# Patient Record
Sex: FEMALE | Race: WHITE | Employment: UNEMPLOYED | URBAN - METROPOLITAN AREA
[De-identification: names, ages, dates, MRNs, and addresses within clinical notes are randomized per-mention and may not be internally consistent; named-entity substitution may affect disease eponyms.]

---

## 2017-06-15 ENCOUNTER — ALLSCRIPTS OFFICE VISIT (OUTPATIENT)
Dept: OTHER | Facility: OTHER | Age: 47
End: 2017-06-15

## 2017-06-15 DIAGNOSIS — Z13.220 ENCOUNTER FOR SCREENING FOR LIPOID DISORDERS: ICD-10-CM

## 2017-06-15 DIAGNOSIS — K91.2 POSTSURGICAL MALABSORPTION, NOT ELSEWHERE CLASSIFIED: ICD-10-CM

## 2017-06-15 DIAGNOSIS — Z98.84 BARIATRIC SURGERY STATUS: ICD-10-CM

## 2017-07-10 ENCOUNTER — HOSPITAL ENCOUNTER (OUTPATIENT)
Dept: RADIOLOGY | Facility: HOSPITAL | Age: 47
Discharge: HOME | End: 2017-07-10
Payer: COMMERCIAL

## 2017-07-10 DIAGNOSIS — Z98.84 BARIATRIC SURGERY STATUS: ICD-10-CM

## 2017-07-10 PROCEDURE — 74240 X-RAY XM UPR GI TRC 1CNTRST: CPT

## 2017-07-26 DIAGNOSIS — Z98.84 BARIATRIC SURGERY STATUS: ICD-10-CM

## 2017-08-31 ENCOUNTER — ALLSCRIPTS OFFICE VISIT (OUTPATIENT)
Dept: OTHER | Facility: OTHER | Age: 47
End: 2017-08-31

## 2017-10-03 ENCOUNTER — ANESTHESIA EVENT (OUTPATIENT)
Dept: GASTROENTEROLOGY | Facility: HOSPITAL | Age: 47
End: 2017-10-03
Payer: COMMERCIAL

## 2017-10-03 RX ORDER — QUETIAPINE FUMARATE 50 MG/1
50 TABLET, FILM COATED ORAL
COMMUNITY
End: 2019-12-30

## 2017-10-03 RX ORDER — FERROUS SULFATE 325(65) MG
325 TABLET ORAL
COMMUNITY
End: 2019-12-30

## 2017-10-03 RX ORDER — IBUPROFEN 200 MG
1 CAPSULE ORAL DAILY
COMMUNITY
End: 2021-01-27

## 2017-10-03 RX ORDER — MELATONIN
1000 DAILY
COMMUNITY
End: 2018-02-02 | Stop reason: ALTCHOICE

## 2017-10-03 RX ORDER — METHYLPHENIDATE HYDROCHLORIDE 5 MG/1
5 TABLET ORAL
COMMUNITY
End: 2018-05-03 | Stop reason: ALTCHOICE

## 2017-10-03 RX ORDER — BUPROPION HYDROCHLORIDE 150 MG/1
150 TABLET ORAL 2 TIMES DAILY
COMMUNITY
End: 2018-01-29 | Stop reason: HOSPADM

## 2017-10-03 RX ORDER — HYDROXYZINE 50 MG/1
50 TABLET, FILM COATED ORAL 3 TIMES DAILY PRN
Status: ON HOLD | COMMUNITY
End: 2017-10-04 | Stop reason: ALTCHOICE

## 2017-10-03 RX ORDER — MULTIVITAMIN
1 CAPSULE ORAL DAILY
COMMUNITY

## 2017-10-03 RX ORDER — CARBAMAZEPINE 200 MG/1
200 TABLET ORAL 3 TIMES DAILY
COMMUNITY
End: 2021-01-27

## 2017-10-03 RX ORDER — VENLAFAXINE HYDROCHLORIDE 150 MG/1
150 CAPSULE, EXTENDED RELEASE ORAL DAILY
COMMUNITY
End: 2018-01-29 | Stop reason: HOSPADM

## 2017-10-04 ENCOUNTER — ANESTHESIA (OUTPATIENT)
Dept: GASTROENTEROLOGY | Facility: HOSPITAL | Age: 47
End: 2017-10-04
Payer: COMMERCIAL

## 2017-10-04 ENCOUNTER — HOSPITAL ENCOUNTER (OUTPATIENT)
Facility: HOSPITAL | Age: 47
Setting detail: OUTPATIENT SURGERY
Discharge: HOME | End: 2017-10-04
Attending: SURGERY | Admitting: SURGERY
Payer: COMMERCIAL

## 2017-10-04 VITALS
OXYGEN SATURATION: 100 % | SYSTOLIC BLOOD PRESSURE: 130 MMHG | DIASTOLIC BLOOD PRESSURE: 66 MMHG | RESPIRATION RATE: 16 BRPM | TEMPERATURE: 98.3 F | HEART RATE: 67 BPM

## 2017-10-04 DIAGNOSIS — Z98.84 BARIATRIC SURGERY STATUS: ICD-10-CM

## 2017-10-04 PROCEDURE — 88342 IMHCHEM/IMCYTCHM 1ST ANTB: CPT | Performed by: SURGERY

## 2017-10-04 PROCEDURE — 88305 TISSUE EXAM BY PATHOLOGIST: CPT | Performed by: SURGERY

## 2017-10-04 RX ORDER — SUCRALFATE ORAL 1 G/10ML
1 SUSPENSION ORAL
Qty: 420 ML | Refills: 0 | Status: SHIPPED | OUTPATIENT
Start: 2017-10-04 | End: 2018-01-11

## 2017-10-04 RX ORDER — SODIUM CHLORIDE 9 MG/ML
125 INJECTION, SOLUTION INTRAVENOUS CONTINUOUS
Status: DISCONTINUED | OUTPATIENT
Start: 2017-10-04 | End: 2017-10-04 | Stop reason: HOSPADM

## 2017-10-04 RX ORDER — OMEPRAZOLE 20 MG/1
20 TABLET, DELAYED RELEASE ORAL DAILY
Qty: 30 TABLET | Refills: 0 | Status: SHIPPED | OUTPATIENT
Start: 2017-10-04 | End: 2018-05-03 | Stop reason: SDUPTHER

## 2017-10-04 RX ORDER — PROPOFOL 10 MG/ML
INJECTION, EMULSION INTRAVENOUS AS NEEDED
Status: DISCONTINUED | OUTPATIENT
Start: 2017-10-04 | End: 2017-10-04 | Stop reason: SURG

## 2017-10-04 RX ORDER — ONDANSETRON 2 MG/ML
4 INJECTION INTRAMUSCULAR; INTRAVENOUS EVERY 6 HOURS PRN
Status: DISCONTINUED | OUTPATIENT
Start: 2017-10-04 | End: 2017-10-04 | Stop reason: HOSPADM

## 2017-10-04 RX ADMIN — SODIUM CHLORIDE 125 ML/HR: 0.9 INJECTION, SOLUTION INTRAVENOUS at 07:51

## 2017-10-04 RX ADMIN — PROPOFOL 180 MG: 10 INJECTION, EMULSION INTRAVENOUS at 08:52

## 2017-10-04 RX ADMIN — LIDOCAINE HYDROCHLORIDE 100 MG: 20 INJECTION, SOLUTION INTRAVENOUS at 08:52

## 2017-10-04 NOTE — H&P
H&P EXAM - Outpatient Endoscopy  [facility] [address] [facility] GI LAB INTRA   Winstonraul Friedman 56 y o  female MRN: [id number]  Unit/Bed#: [facility] Encounter: [id number]        Impression: Morbid obesity with weight regain s/p Claudette en Y Gastric Bypass    Plan:Upper endoscopy rule out gastr-gastric fistula    Chief Complaint: Morbid obesity and weight regain s/p RYGB    Physical Exam: Normal not in acute distress   Chest: Clear to auscultation   Heart: Normal S1 and S2

## 2017-10-04 NOTE — OP NOTE
OPERATIVE REPORT  PATIENT NAME: Deshawn Lujan    :  1970  MRN: [id number]  Pt Location: [facility] GI ROOM 01    SURGERY DATE: 10/4/2017    Surgeon(s) and Role:     * Narda Betancur MD - Primary    Preop Diagnosis:  Bariatric surgery status [Z98 84]    Post-Op Diagnosis Codes: * Bariatric surgery status [Z98 84]    Procedure(s) (LRB):  ESOPHAGOGASTRODUODENOSCOPY (EGD) (N/A)    Specimen(s):  * No specimens in log *    Estimated Blood Loss:   Minimal    Drains:       Anesthesia Type:   IV Sedation with Anesthesia    Operative Indications:  Bariatric surgery status [Z98 84]      Operative Findings:  1  Hiatal hernia  2  Large gastric pouch  3   Marginal ulcer    Complications:   None    Procedure and Technique:  Upper endoscopy and biopsy   I was present for the entire procedure    Patient Disposition:  hemodynamically stable       Indication:   Patient suffers from morbid obesity s/p RYGB presenting with weight regain    Description of the procedure: The patient was brought to the endoscopy suite and was placed in  left lateral decubitus position  A time-out was called and the patient was identified by name and by armband  The patient received IV  Propofol under closed monitoring  by the anesthesiologist and nurse anesthesist     Upper endoscopy was performed under direct visualization  Esophagus was visualized and showed normal findings   The GE junction showed a hiatal hernia   The stomach pouch was then inspected and showed a markedly enlarged gastric pouch  Gastrojejunostomy was inspected and showed a small marginal ulcer    Blind end and Claudette limbs were both inspected and showed normal findings  Biopsy was taken with a punch biopsy forceps from the gastric pouch and sent to pathology to rule out H  Pylori  Gastro-gastric Fistula was not identified      The patient tolerated the procedure well and was transferred to the recovery unit in stable condition           SIGNATURE: Narda Betancur MD  DATE: October 4, 2017  TIME: 8:51 AM

## 2017-10-04 NOTE — ANESTHESIA PREPROCEDURE EVALUATION
Review of Systems/Medical History  Patient summary reviewed  Chart reviewed  No history of anesthetic complications     Cardiovascular  Negative cardio ROS    Pulmonary  Shortness of breath, Sleep apnea CPAP, ,        GI/Hepatic  Negative GI/hepatic ROS   Bariatric surgery,        Negative  ROS        Endo/Other  Negative endo/other ROS      GYN      Comment: PCOS     Hematology  Negative hematology ROS      Musculoskeletal  Obesity  morbid obesity, No achondroplasia       Neurology   Psychology   Anxiety, Depression , bipolar disorder,            Physical Exam    Airway    Mallampati score: II  TM Distance: >3 FB  Neck ROM: full     Dental   No notable dental hx     Cardiovascular  Comment: Negative ROS, Rhythm: regular, Rate: normal, Cardiovascular exam normal    Pulmonary  Pulmonary exam normal Breath sounds clear to auscultation,     Other Findings        Anesthesia Plan  ASA Score- 3       Anesthesia Type- IV sedation with anesthesia        Induction- intravenous      Informed Consent  Anesthetic plan and risks discussed with patient

## 2017-10-12 ENCOUNTER — ALLSCRIPTS OFFICE VISIT (OUTPATIENT)
Dept: OTHER | Facility: OTHER | Age: 47
End: 2017-10-12

## 2017-10-13 NOTE — PROGRESS NOTES
Assessment  1  S/P bariatric surgery (V45 86) (Z63 49)    Discussion/Summary    The patient is presenting to review her upper endoscopy findings after she completed her workup for recidivism, upper endoscopy showed evidence of markedly dilated gastric pouch and a possible non excluded fundus and addition to small marginal ulcer which is currently being treated medically  I had a long discussion with the patient regarding upper endoscopy findings and I recommended that she undergoes a revision of her gastric pouch, patient will start the workup by meeting with our dietitian and  for an evaluation after which her case will be discussed at our multidisciplinary meeting to assess her commitment a compliance  Patient was also informed about the increased perioperative complication rates given the nature of the procedure  all questions were answered and all concerns were addressed  Chief Complaint  Patient in office today to review EGD  Review of Systems    Constitutional: No fever, no chills, feels well, no tiredness, no recent weight gain or weight loss  Eyes: No complaints of eye pain, no red eyes, no eyesight problems, no discharge, no dry eyes, no itching of eyes  ENT: no complaints of earache, no loss of hearing, no nose bleeds, no nasal discharge, no sore throat, no hoarseness  Cardiovascular: No complaints of slow heart rate, no fast heart rate, no chest pain, no palpitations, no leg claudication, no lower extremity edema  Respiratory: No complaints of shortness of breath, no wheezing, no cough, no SOB on exertion, no orthopnea, no PND  Gastrointestinal: No complaints of abdominal pain, no constipation, no nausea or vomiting, no diarrhea, no bloody stools  Genitourinary: No complaints of dysuria, no incontinence, no pelvic pain, no dysmenorrhea, no vaginal discharge or bleeding     Musculoskeletal: No complaints of arthralgias, no myalgias, no joint swelling or stiffness, no limb pain or swelling  Integumentary: No complaints of skin rash or lesions, no itching, no skin wounds, no breast pain or lump  Neurological: No complaints of headache, no confusion, no convulsions, no numbness, no dizziness or fainting, no tingling, no limb weakness, no difficulty walking  Psychiatric: Not suicidal, no sleep disturbance, no anxiety or depression, no change in personality, no emotional problems  Endocrine: No complaints of proptosis, no hot flashes, no muscle weakness, no deepening of the voice, no feelings of weakness  Hematologic/Lymphatic: No complaints of swollen glands, no swollen glands in the neck, does not bleed easily, does not bruise easily  Active Problems  1  Anxiety (300 00) (F41 9)   2  Bipolar disorder (296 80) (F31 9)   3  Depression with anxiety (300 4) (F41 8)   4  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 3) (K91 2,Z90 3)   5  Morbid obesity (278 01) (E66 01)   6  Nausea (787 02) (R11 0)   7  Obstructive sleep apnea (327 23) (G47 33)   8  OUMOU on CPAP (327 23,V46 8) (G47 33,Z99 89)   9  Polycystic ovarian syndrome (256 4) (E28 2)   10  Recurrent major depressive disorder, remission status unspecified (296 30) (F33 9)   11  S/P bariatric surgery (V45 86) (Z98 84)   12  Screening for lipoid disorders (V77 91) (Z13 220)   13  Shortness of breath (786 05) (R06 02)    Social History   · Alcohol Use (History)   · Alcoholism in recovery (303 90) (F10 20)   · Former smoker (Y14 02) (K68 801)   · Denied: History of drug use   · Never a smoker    Current Meds   1  BuPROPion HCl ER (SR) 150 MG Oral Tablet Extended Release 12 Hour; Therapy: 25VOE4308 to Recorded   2  Calcium Citrate CAPS; Therapy: (Recorded:70Flq1039) to Recorded   3  Carafate 1 GM/10ML Oral Suspension; Therapy: (Recorded:04Oct2017) to Recorded   4  CarBAMazepine 200 MG Oral Tablet; TAKE 3 TABLET Twice daily; Therapy: 01AYB7028 to (Evaluate:21Cny9131); Last XA:92GWV2822 Ordered   5   HydrOXYzine HCl - 50 MG Oral Tablet; TAKE 1 TABLET 3-4 TIMES DAILY; Therapy: 10HPN1221 to (Evaluate:54Nyf4428)  Requested for: 28MYE2097; Last   Rx:15Jun2017 Ordered   6  Iron Supplement TABS; Therapy: (Recorded:45Oho9304) to Recorded   7  Methylphenidate HCl - 5 MG Oral Tablet; TAKE 1 TABLET 3 TIMES DAILY @ 800, 1200,   1600; Therapy: 10RMH5088 to (Evaluate:63Kxp0158); Last Rx:15Jun2017 Ordered   8  Multi-Day Oral Tablet; Therapy: (Recorded:54Rcn2226) to Recorded   9  Omeprazole 20 MG Oral Capsule Delayed Release; Therapy: (Recorded:04Oct2017) to Recorded   10  QUEtiapine Fumarate 50 MG Oral Tablet; Therapy: 63OUS6038 to Recorded   11  Sucralfate 1 GM Oral Tablet; TAKE 1 TABLET 4 TIMES A DAY; Therapy: 58CFD6103 to (Last Rx:05Oct2017)  Requested for: 05Oct2017; Status: ACTIVE -    Retrospective By Protocol Authorization Ordered   12  Venlafaxine HCl  MG Oral Capsule Extended Release 24 Hour; Take 1 capsule    twice daily; Therapy: 11CYW7312 to (Evaluate:36Rtj4011); Last Rx:15Jun2017 Ordered   13  Vitamin D TABS; Therapy: (Recorded:22Ebk7326) to Recorded    Allergies  1  Percocet TABS    Vitals   Recorded: 88FBB3083 09:29AM   Temperature 98 F   Heart Rate 80   Respiration 18   Systolic 576   Diastolic 84   Height 5 ft 5 5 in   Weight 238 lb 8 0 oz   BMI Calculated 39 09   BSA Calculated 2 14     Physical Exam    Constitutional   General appearance: No acute distress, well appearing and well nourished  Abdomen   Abdomen: Non-tender, no masses  Liver and spleen: No hepatomegaly or splenomegaly  Future Appointments    Date/Time Provider Specialty Site   10/30/2017 08:00 AM SEBASTIEN Frey, RD Dietitian [facility]     Signatures   Electronically signed by : JOSÉ MANUEL Yang ; Oct 12 2017  9:52AM EST                       (Author)    Electronically signed by :  JOSÉ MANUEL Yang ; Oct 12 2017 10:03AM EST                       (Author)

## 2017-10-30 ENCOUNTER — GENERIC CONVERSION - ENCOUNTER (OUTPATIENT)
Dept: OTHER | Facility: OTHER | Age: 47
End: 2017-10-30

## 2017-10-30 DIAGNOSIS — F10.20 UNCOMPLICATED ALCOHOL DEPENDENCE (HCC): ICD-10-CM

## 2017-10-30 DIAGNOSIS — Z98.84 BARIATRIC SURGERY STATUS: ICD-10-CM

## 2017-10-30 DIAGNOSIS — K91.2 POSTSURGICAL MALABSORPTION, NOT ELSEWHERE CLASSIFIED (CODE): ICD-10-CM

## 2017-10-30 DIAGNOSIS — F41.9 ANXIETY DISORDER: ICD-10-CM

## 2017-11-08 ENCOUNTER — ALLSCRIPTS OFFICE VISIT (OUTPATIENT)
Dept: OTHER | Facility: OTHER | Age: 47
End: 2017-11-08

## 2017-11-14 ENCOUNTER — GENERIC CONVERSION - ENCOUNTER (OUTPATIENT)
Dept: OTHER | Facility: OTHER | Age: 47
End: 2017-11-14

## 2017-12-01 ENCOUNTER — GENERIC CONVERSION - ENCOUNTER (OUTPATIENT)
Dept: OTHER | Facility: OTHER | Age: 47
End: 2017-12-01

## 2017-12-05 ENCOUNTER — GENERIC CONVERSION - ENCOUNTER (OUTPATIENT)
Dept: OTHER | Facility: OTHER | Age: 47
End: 2017-12-05

## 2017-12-06 ENCOUNTER — GENERIC CONVERSION - ENCOUNTER (OUTPATIENT)
Dept: BARIATRICS | Facility: CLINIC | Age: 47
End: 2017-12-06

## 2017-12-07 ENCOUNTER — GENERIC CONVERSION - ENCOUNTER (OUTPATIENT)
Dept: OTHER | Facility: OTHER | Age: 47
End: 2017-12-07

## 2017-12-15 ENCOUNTER — ALLSCRIPTS OFFICE VISIT (OUTPATIENT)
Dept: OTHER | Facility: OTHER | Age: 47
End: 2017-12-15

## 2017-12-16 NOTE — CONSULTS
Assessment  1  Anemia, iron deficiency (280 9) (D50 9)   2  S/P bariatric surgery (V45 86) (Z98 84)   3  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 3) (K91 2,Z90 3)   4  B12 deficiency (266 2) (E53 8)   5  History of Panniculectomy    Plan  B12 deficiency    · (1) CBC/PLT/DIFF; Status:Active; Requested for:Dates Schedule: 2018 ; Perform:[facility]; VYR:92CAY1680;CODIUTO; For:B12 deficiency; Ordered By:Ioana Pate;   · (1) COMPREHENSIVE METABOLIC PANEL; Status:Active; Requested for:DatesSchedule: 2018 ; Perform:[facility]; AWH:82ZOP3246;BHJKFHV; For:B12 deficiency; Ordered By:Ioana Pate;   · (1) VITAMIN B12; Status:Active; Requested for:Dates Schedule: 2018 ; Perform:[facility]; FI52PMB5234;NZHFWDK; For:B12 deficiency; Ordered By:Ioana Pate;   · Follow-up visit in 6 months Evaluation and Treatment  Follow-up  Status: Complete Done: 89QWA3201   Ordered; For: B12 deficiency; Ordered By: Marilee Maria Performed:  Due: 69LST6315; Last Updated By: Darrin Ganser; 12/15/2017 9:15:08 AM  B12 deficiency, Intestinal malabsorption following gastrectomy    · (1) FERRITIN; Status:Active; Requested for:Dates Schedule: 2018 ; Perform:[facility]; TGA:66BPC1395;MUVEXEA; For:B12 deficiency, Intestinal malabsorption following gastrectomy; Ordered By:Cornelia Pate;   · (1) IRON; Status:Active; Requested for:Dates Schedule: 2018 ; Perform:[facility]; KOE:35TPM4886;GXPXVOP; For:B12 deficiency, Intestinal malabsorption following gastrectomy; Ordered By:Cornelia Pate;   · (1) TIBC; Status:Active; Requested for:Dates Schedule: 2018 ; Perform:[facility]; HLW:48MZP6263;CRCTZGX; For:B12 deficiency, Intestinal malabsorption following gastrectomy; Ordered By:Ioana Pate;     Discussion/Summary    #1 Iron deficiency anemia with ferritin of 6 iron saturation 9% 2017 in a patient who has undergone gastric bypass surgery  Potential side effects of IV iron could include but may not be limited to: change in taste, diarrhea, muscle cramps, nausea or vomiting, pain in the arms or legs, pain or burning sensation in the injection site, allergic reaction  The patient verbalized understanding and wishes to proceed  Venofer 200mg IV 1-2 week x 8 doses recommended  #2 Vitamin B12 deficiency in a patient who has undergone gastric bypass surgery  B12 1000mcg IM weekly with IV iron replacement recommended  She is asked to follow up in 6 months with repeat labwork prior  Any issues or concerns in the interim, she is asked to call the office  History of Present Illness  Tina Burroughs is a 56year old female seen for initial consultation 12/15/2017 regarding iron deficiency anemia  She underwent gastric bypass surgery in 2006 by Dr Edmundo Mas  She is being considered for revision by Dr Quarles Fear  She reports her menstrual cycles are not excessively heavy  Her menses are irregular secondary to PCOS  She states her last was in October lasted 2-3 days  She does experience some early satiety, nausea related to an ulcer  she is taking a PPI and Carafate  She does have fatigue, dyspnea on exertion  She denies lightheadedness or dizziness  she does have oral iron at home but states that she only typically remembers to take it 3/week  11/8/2017 HNL: hemoglobin was 10 7, MCV of 82, RDW 17 4, platelet count 311, white blood cell count 4 4 with normal differential  Iron saturation 9%, ferritin 6  TSH 0 77/13/2017 hemoglobin 10 7, MCV of 84, platelet count 637, white blood cell count 5, RDW 16 4, vitamin B12 low normal at 255March 2013 hemoglobin 13 6, MCV 95, RDW 13 3, platelet count 485, white blood cell count 4  1  She states that post gastric bypass she had issues with addiction transferance with alcohol but has been sober for 4 years  At about this time, she started putting weight back on   She states I don't eat well  She is working with a nutritionist  Tentatively, her revision surgery is for January 10th, 2018  her menses are irregular secondary to PCOS  She states her last was in October lasted 2-3 days  She does experience some early satiety, nausea related to an ulcer  Review of Systems   Constitutional: feeling poorly,-- recent weight gain-- and-- feeling tired, but-- no fever,-- no chills-- and-- no recent weight loss  Eyes: No complaints of eye pain, no red eyes, no eyesight problems, no discharge, no dry eyes, no itching of eyes  ENT: no complaints of earache, no loss of hearing, no nose bleeds, no nasal discharge, no sore throat, no hoarseness  Cardiovascular: No complaints of slow heart rate, no fast heart rate, no chest pain, no palpitations, no leg claudication, no lower extremity edema  Respiratory: wheezing-- and-- shortness of breath during exertion, but-- no cough-- and-- no orthopnea  Gastrointestinal: as noted in HPI  Genitourinary: as noted in HPI  Musculoskeletal: No complaints of arthralgias, no myalgias, no joint swelling or stiffness, no limb pain or swelling  Integumentary: No complaints of skin rash or lesions, no itching, no skin wounds, no breast pain or lump  Neurological: No complaints of headache, no confusion, no convulsions, no numbness, no dizziness or fainting, no tingling, no limb weakness, no difficulty walking  Psychiatric: anxiety-- and-- depression, but-- not suicidal,-- no personality change,-- no sleep disturbances-- and-- no emotional problems  Endocrine: No complaints of proptosis, no hot flashes, no muscle weakness, no deepening of the voice, no feelings of weakness  Hematologic/Lymphatic: No complaints of swollen glands, no swollen glands in the neck, does not bleed easily, does not bruise easily  Active Problems  1  Anxiety (300 00) (F41 9)   2  Bipolar disorder (296 80) (F31 9)   3  Depression with anxiety (300 4) (F41 8)   4   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 3) (K91 2,Z90 3)   5  Morbid obesity (278 01) (E66 01)   6  Nausea (787 02) (R11 0)   7  Obstructive sleep apnea (327 23) (G47 33)   8  OUMOU on CPAP (327 23,V46 8) (G47 33,Z99 89)   9  Polycystic ovarian syndrome (256 4) (E28 2)   10  Preoperative clearance (V72 84) (Z01 818)   11  Recurrent major depressive disorder, remission status unspecified (296 30) (F33 9)   12  S/P bariatric surgery (V45 86) (Z98 84)   13  Screening for lipoid disorders (V77 91) (Z13 220)   14  Shortness of breath (786 05) (R06 02)    Surgical History  1  History of Cholecystectomy   2  History of Gastric Surgery For Morbid Obesity   3  History of Panniculectomy    Family History  Mother    1  Family history of bipolar disorder (V17 0) (Z81 8)   2  Family history of diabetes mellitus (V18 0) (Z83 3)  Father    3  Family history of hypertension (V17 49) (Z82 49)  Maternal Grandmother    4  Family history of bipolar disorder (V17 0) (Z81 8)   5  Family history of diabetes mellitus (V18 0) (Z83 3)   6  Family history of malignant neoplasm of breast (V16 3) (Z80 3)   7  Family history of malignant neoplasm of ovary (V16 41) (Z80 41)  Aunt    8  Family history of bipolar disorder (V17 0) (Z81 8)   9  Family history of diabetes mellitus (V18 0) (Z83 3)   10  Family history of malignant neoplasm of ovary (V16 41) (Z80 41)    The family history was reviewed and updated today  Social History   · Alcohol Use (History)   · Alcoholism in recovery (303 90) (F10 20)   · Former smoker (N58 94) (J86 969)   · Denied: History of drug use   · Never a smoker    Current Meds   1  BuPROPion HCl ER (SR) 150 MG Oral Tablet Extended Release 12 Hour; Therapy: 59WVZ9903 to Recorded   2  Calcium Citrate CAPS; Therapy: (Recorded:53Qbj6622) to Recorded   3  Carafate 1 GM/10ML Oral Suspension; Therapy: (Recorded:04Oct2017) to Recorded   4  CarBAMazepine 200 MG Oral Tablet; TAKE 1 TABLET TWICE DAILY; Therapy: 95EUN3431 to Recorded   5   Iron Supplement TABS; Therapy: (Recorded:23Gsv9337) to Recorded   6  Methylphenidate HCl - 5 MG Oral Tablet; TAKE 1 TABLET 3 TIMES DAILY @ 800, 1200, 1600; Therapy: 56YNZ4265 to (Evaluate:53Mes0172); Last Rx:15Jun2017 Ordered   7  Multi-Day Oral Tablet; Therapy: (Recorded:55Ypg1739) to Recorded   8  Omeprazole 20 MG Oral Capsule Delayed Release; TAKE 1 CAPSULE BY MOUTH EVERY DAY; Therapy: 60AMU9782 to (Evaluate:09Dec2017)  Requested for: 70OBA0680; Last Rx:09Nov2017 Ordered   9  QUEtiapine Fumarate 50 MG Oral Tablet; Therapy: 30PNY5378 to Recorded   10  Sucralfate 1 GM Oral Tablet; TAKE 1 TABLET 4 TIMES DAILY; Therapy: 34VRP0128 to (Evaluate:07Jan2018)  Requested for: 69ZCF4853; Last  Rx:43Nwf5464; Status: ACTIVE - Retrospective By Protocol Authorization Ordered   11  Venlafaxine HCl  MG Oral Capsule Extended Release 24 Hour; Take 1 capsule  twice daily; Therapy: 47XRW2057 to (Evaluate:07Bnw6043); Last Rx:15Jun2017 Ordered   12  Vitamin D TABS; Therapy: (Recorded:63Kkv0067) to Recorded    The medication list was reviewed and updated today  Allergies  1  Percocet TABS    Vitals  Vital Signs    Recorded: 15Dec2017 08:22AM   Temperature 98 F   Heart Rate 101   Respiration 18   Systolic 427   Diastolic 82   Height 5 ft 5 in   Weight 240 lb 8 0 oz   BMI Calculated 40 02   BSA Calculated 2 14   O2 Saturation 98   Pain Scale 0     Physical Exam   Constitutional  General appearance: No acute distress, well appearing and well nourished  Eyes  Conjunctiva and lids: No swelling, erythema or discharge  Pupils and irises: Equal, round and reactive to light  Ears, Nose, Mouth, and Throat  External inspection of ears and nose: Normal    Oropharynx: Normal with no erythema, edema, exudate or lesions  Pulmonary  Respiratory effort: No increased work of breathing or signs of respiratory distress  Auscultation of lungs: Clear to auscultation  Cardiovascular  Palpation of heart: Normal PMI, no thrills  Auscultation of heart: Normal rate and rhythm, normal S1 and S2, without murmurs  Examination of extremities for edema and/or varicosities: Normal    Abdomen  Abdomen: Non-tender, no masses  Liver and spleen: No hepatomegaly or splenomegaly  Lymphatic  Palpation of lymph nodes in neck: No lymphadenopathy  Musculoskeletal  Gait and station: Normal    Digits and nails: Normal without clubbing or cyanosis  Skin  Skin and subcutaneous tissue: Normal without rashes or lesions  Psychiatric  Orientation to person, place, and time: Normal          Future Appointments    Date/Time Provider Specialty Site   06/15/2018 11:00 AM JOSÉ MANUEL Narayanan  Hematology Oncology [facility] [place]   01/24/2018 08:30 AM JOSÉ MANUEL Jj   General Surgery [facility] INPATIENT     Signatures   Electronically signed by : Neeraj Quintana [facility]; Dec 15 2017  9:50AM EST                       (Author)    Electronically signed by : JOSÉ MANUEL Howard ; Dec 15 2017 12:36PM EST

## 2017-12-19 ENCOUNTER — GENERIC CONVERSION - ENCOUNTER (OUTPATIENT)
Dept: OTHER | Facility: OTHER | Age: 47
End: 2017-12-19

## 2017-12-20 RX ORDER — SODIUM CHLORIDE 9 MG/ML
20 INJECTION, SOLUTION INTRAVENOUS CONTINUOUS
Status: DISCONTINUED | OUTPATIENT
Start: 2017-12-21 | End: 2017-12-24 | Stop reason: HOSPADM

## 2017-12-20 RX ORDER — CYANOCOBALAMIN 1000 UG/ML
1000 INJECTION INTRAMUSCULAR; SUBCUTANEOUS ONCE
Status: COMPLETED | OUTPATIENT
Start: 2017-12-21 | End: 2017-12-21

## 2017-12-21 ENCOUNTER — HOSPITAL ENCOUNTER (OUTPATIENT)
Dept: INFUSION CENTER | Facility: CLINIC | Age: 47
Discharge: HOME | End: 2017-12-23
Payer: COMMERCIAL

## 2017-12-21 VITALS
SYSTOLIC BLOOD PRESSURE: 129 MMHG | RESPIRATION RATE: 18 BRPM | DIASTOLIC BLOOD PRESSURE: 67 MMHG | HEART RATE: 97 BPM | TEMPERATURE: 95.7 F

## 2017-12-21 PROCEDURE — 96372 THER/PROPH/DIAG INJ SC/IM: CPT

## 2017-12-21 PROCEDURE — 96365 THER/PROPH/DIAG IV INF INIT: CPT

## 2017-12-21 RX ADMIN — CYANOCOBALAMIN 1000 MCG: 1000 INJECTION, SOLUTION INTRAMUSCULAR at 10:57

## 2017-12-21 RX ADMIN — SODIUM CHLORIDE 20 ML/HR: 0.9 INJECTION, SOLUTION INTRAVENOUS at 09:40

## 2017-12-21 RX ADMIN — IRON SUCROSE 200 MG: 20 INJECTION, SOLUTION INTRAVENOUS at 09:54

## 2017-12-27 ENCOUNTER — GENERIC CONVERSION - ENCOUNTER (OUTPATIENT)
Dept: OTHER | Facility: OTHER | Age: 47
End: 2017-12-27

## 2017-12-28 ENCOUNTER — GENERIC CONVERSION - ENCOUNTER (OUTPATIENT)
Dept: OTHER | Facility: OTHER | Age: 47
End: 2017-12-28

## 2017-12-29 ENCOUNTER — GENERIC CONVERSION - ENCOUNTER (OUTPATIENT)
Dept: OTHER | Facility: OTHER | Age: 47
End: 2017-12-29

## 2017-12-29 RX ORDER — SODIUM CHLORIDE 9 MG/ML
20 INJECTION, SOLUTION INTRAVENOUS CONTINUOUS
Status: DISCONTINUED | OUTPATIENT
Start: 2017-12-30 | End: 2018-01-02 | Stop reason: HOSPADM

## 2017-12-29 RX ORDER — CYANOCOBALAMIN 1000 UG/ML
1000 INJECTION INTRAMUSCULAR; SUBCUTANEOUS ONCE
Status: COMPLETED | OUTPATIENT
Start: 2017-12-30 | End: 2017-12-30

## 2017-12-30 ENCOUNTER — HOSPITAL ENCOUNTER (OUTPATIENT)
Dept: INFUSION CENTER | Facility: CLINIC | Age: 47
Discharge: HOME | End: 2018-01-01
Payer: COMMERCIAL

## 2017-12-30 VITALS
OXYGEN SATURATION: 95 % | RESPIRATION RATE: 18 BRPM | TEMPERATURE: 98.1 F | HEART RATE: 85 BPM | DIASTOLIC BLOOD PRESSURE: 74 MMHG | SYSTOLIC BLOOD PRESSURE: 110 MMHG

## 2017-12-30 PROCEDURE — 96365 THER/PROPH/DIAG IV INF INIT: CPT

## 2017-12-30 PROCEDURE — 96372 THER/PROPH/DIAG INJ SC/IM: CPT

## 2017-12-30 RX ADMIN — IRON SUCROSE 200 MG: 20 INJECTION, SOLUTION INTRAVENOUS at 10:57

## 2017-12-30 RX ADMIN — CYANOCOBALAMIN 1000 MCG: 1000 INJECTION, SOLUTION INTRAMUSCULAR at 10:58

## 2017-12-30 RX ADMIN — SODIUM CHLORIDE 20 ML/HR: 0.9 INJECTION, SOLUTION INTRAVENOUS at 10:54

## 2017-12-30 NOTE — PROGRESS NOTES
Pt received venofer infusion &Vit B12 inj given in left deltoid   Tolerated well, offers no complaints

## 2018-01-02 ENCOUNTER — GENERIC CONVERSION - ENCOUNTER (OUTPATIENT)
Dept: OTHER | Facility: OTHER | Age: 48
End: 2018-01-02

## 2018-01-02 RX ORDER — SODIUM CHLORIDE 9 MG/ML
20 INJECTION, SOLUTION INTRAVENOUS CONTINUOUS
Status: DISCONTINUED | OUTPATIENT
Start: 2018-01-03 | End: 2018-01-06 | Stop reason: HOSPADM

## 2018-01-03 ENCOUNTER — GENERIC CONVERSION - ENCOUNTER (OUTPATIENT)
Dept: OTHER | Facility: OTHER | Age: 48
End: 2018-01-03

## 2018-01-03 ENCOUNTER — HOSPITAL ENCOUNTER (OUTPATIENT)
Dept: INFUSION CENTER | Facility: CLINIC | Age: 48
Discharge: HOME | End: 2018-01-05
Payer: COMMERCIAL

## 2018-01-03 VITALS
DIASTOLIC BLOOD PRESSURE: 63 MMHG | TEMPERATURE: 97.3 F | HEART RATE: 99 BPM | RESPIRATION RATE: 22 BRPM | SYSTOLIC BLOOD PRESSURE: 139 MMHG | OXYGEN SATURATION: 99 %

## 2018-01-03 PROCEDURE — 96365 THER/PROPH/DIAG IV INF INIT: CPT

## 2018-01-03 RX ADMIN — SODIUM CHLORIDE 20 ML/HR: 0.9 INJECTION, SOLUTION INTRAVENOUS at 14:55

## 2018-01-03 RX ADMIN — IRON SUCROSE 200 MG: 20 INJECTION, SOLUTION INTRAVENOUS at 14:55

## 2018-01-04 RX ORDER — CYANOCOBALAMIN 1000 UG/ML
1000 INJECTION INTRAMUSCULAR; SUBCUTANEOUS ONCE
Status: COMPLETED | OUTPATIENT
Start: 2018-01-06 | End: 2018-01-06

## 2018-01-04 RX ORDER — SODIUM CHLORIDE 9 MG/ML
20 INJECTION, SOLUTION INTRAVENOUS CONTINUOUS
Status: DISCONTINUED | OUTPATIENT
Start: 2018-01-06 | End: 2018-01-09 | Stop reason: HOSPADM

## 2018-01-06 ENCOUNTER — HOSPITAL ENCOUNTER (OUTPATIENT)
Dept: INFUSION CENTER | Facility: CLINIC | Age: 48
Discharge: HOME | End: 2018-01-08
Payer: COMMERCIAL

## 2018-01-06 VITALS
RESPIRATION RATE: 20 BRPM | HEART RATE: 97 BPM | SYSTOLIC BLOOD PRESSURE: 122 MMHG | TEMPERATURE: 97.6 F | OXYGEN SATURATION: 98 % | DIASTOLIC BLOOD PRESSURE: 76 MMHG

## 2018-01-06 PROCEDURE — 96375 TX/PRO/DX INJ NEW DRUG ADDON: CPT

## 2018-01-06 PROCEDURE — 96365 THER/PROPH/DIAG IV INF INIT: CPT

## 2018-01-06 RX ADMIN — SODIUM CHLORIDE 20 ML/HR: 0.9 INJECTION, SOLUTION INTRAVENOUS at 11:00

## 2018-01-06 RX ADMIN — CYANOCOBALAMIN 1000 MCG: 1000 INJECTION, SOLUTION INTRAMUSCULAR at 11:57

## 2018-01-06 RX ADMIN — IRON SUCROSE 200 MG: 20 INJECTION, SOLUTION INTRAVENOUS at 11:03

## 2018-01-11 ENCOUNTER — ANESTHESIA EVENT (OUTPATIENT)
Dept: PERIOP | Facility: HOSPITAL | Age: 48
DRG: 326 | End: 2018-01-11
Payer: COMMERCIAL

## 2018-01-11 ENCOUNTER — ALLSCRIPTS OFFICE VISIT (OUTPATIENT)
Dept: OTHER | Facility: OTHER | Age: 48
End: 2018-01-11

## 2018-01-11 RX ORDER — SODIUM CHLORIDE 9 MG/ML
20 INJECTION, SOLUTION INTRAVENOUS CONTINUOUS
Status: DISCONTINUED | OUTPATIENT
Start: 2018-01-12 | End: 2018-01-15 | Stop reason: HOSPADM

## 2018-01-11 RX ORDER — CYANOCOBALAMIN 1000 UG/ML
1000 INJECTION INTRAMUSCULAR; SUBCUTANEOUS ONCE
Status: COMPLETED | OUTPATIENT
Start: 2018-01-12 | End: 2018-01-12

## 2018-01-11 RX ORDER — ACETAMINOPHEN 500 MG
500 TABLET ORAL EVERY 6 HOURS PRN
COMMUNITY
End: 2018-05-03

## 2018-01-11 RX ORDER — SUCRALFATE 1 G/1
1 TABLET ORAL 4 TIMES DAILY
COMMUNITY
End: 2018-01-29 | Stop reason: HOSPADM

## 2018-01-11 NOTE — ANESTHESIA PREPROCEDURE EVALUATION
Review of Systems/Medical History  Patient summary reviewed  Chart reviewed  No history of anesthetic complications     Cardiovascular  Negative cardio ROS    Pulmonary  Shortness of breath, Sleep apnea , ,        GI/Hepatic  Negative GI/hepatic ROS   GERD , Bariatric surgery,   Comment: Ulcer found on endoscopy  Unknown location     Negative  ROS        Endo/Other  Negative endo/other ROS      GYN      Comment: Irregular menses cycle     Hematology  Negative hematology ROS Anemia iron deficiency anemia,    Comment: On iron infusion cycle Musculoskeletal  Obesity , No achondroplasia       Neurology   Psychology   Anxiety, Depression , bipolar disorder,            Physical Exam    Airway    Mallampati score: III  TM Distance: >3 FB  Neck ROM: full     Dental   No notable dental hx     Cardiovascular  Comment: Negative ROS, Rhythm: regular, Rate: normal, Cardiovascular exam normal    Pulmonary  Pulmonary exam normal Breath sounds clear to auscultation,     Other Findings        Anesthesia Plan  ASA Score- 3     Anesthesia Type- general with ASA Monitors  Additional Monitors:   Airway Plan: ETT  Plan Factors-    Induction- intravenous  Postoperative Plan-     Informed Consent- Anesthetic plan and risks discussed with patient

## 2018-01-11 NOTE — PRE-PROCEDURE INSTRUCTIONS
Pre-Surgery Instructions:   Medication Instructions    acetaminophen (TYLENOL) 500 mg tablet Patient was instructed by Physician and understands   buPROPion (WELLBUTRIN XL) 150 mg 24 hr tablet Patient was instructed by Physician and understands   calcium citrate (CALCITRATE) 950 MG tablet Patient was instructed by Physician and understands   carBAMazepine (TEGretol) 200 mg tablet Patient was instructed by Physician and understands   cholecalciferol (VITAMIN D3) 1,000 units tablet Patient was instructed by Physician and understands   Cyanocobalamin (VITAMIN B-12 SL) Patient was instructed by Physician and understands   ferrous sulfate 325 (65 Fe) mg tablet Patient was instructed by Physician and understands   methylphenidate (RITALIN) 5 mg tablet Patient was instructed by Physician and understands   Multiple Vitamin (MULTIVITAMIN) capsule Patient was instructed by Physician and understands   omeprazole (PriLOSEC OTC) 20 MG tablet Patient was instructed by Physician and understands   QUEtiapine (SEROquel) 50 mg tablet Patient was instructed by Physician and understands   sucralfate (CARAFATE) 1 g tablet Patient was instructed by Physician and understands   venlafaxine (EFFEXOR-XR) 150 mg 24 hr capsule Patient was instructed by Physician and understands  Pt instructed by Dr Jada House to take buspar, omeprazole and zoloft with a sip of water the morning of surgery  Pt given/reviewed [facility] preop instructions and chlorhexadine soap   Pt to hold asa/NSAIDS/vitamins/herbal supplements one week before surgery or as per dr Yelena Sorensen

## 2018-01-12 ENCOUNTER — HOSPITAL ENCOUNTER (OUTPATIENT)
Dept: INFUSION CENTER | Facility: CLINIC | Age: 48
Discharge: HOME | End: 2018-01-14
Payer: COMMERCIAL

## 2018-01-12 VITALS
SYSTOLIC BLOOD PRESSURE: 120 MMHG | OXYGEN SATURATION: 97 % | HEART RATE: 86 BPM | RESPIRATION RATE: 18 BRPM | BODY MASS INDEX: 38.22 KG/M2 | WEIGHT: 235 LBS | DIASTOLIC BLOOD PRESSURE: 78 MMHG | TEMPERATURE: 98 F

## 2018-01-12 VITALS — HEIGHT: 66 IN | BODY MASS INDEX: 37.8 KG/M2 | WEIGHT: 235.19 LBS

## 2018-01-12 VITALS — HEIGHT: 66 IN | BODY MASS INDEX: 37.82 KG/M2 | WEIGHT: 235.3 LBS

## 2018-01-12 PROCEDURE — 96365 THER/PROPH/DIAG IV INF INIT: CPT

## 2018-01-12 PROCEDURE — 96372 THER/PROPH/DIAG INJ SC/IM: CPT

## 2018-01-12 RX ADMIN — SODIUM CHLORIDE 20 ML/HR: 0.9 INJECTION, SOLUTION INTRAVENOUS at 08:33

## 2018-01-12 RX ADMIN — CYANOCOBALAMIN 1000 MCG: 1000 INJECTION, SOLUTION INTRAMUSCULAR at 08:37

## 2018-01-12 RX ADMIN — IRON SUCROSE 200 MG: 20 INJECTION, SOLUTION INTRAVENOUS at 08:36

## 2018-01-12 NOTE — PROGRESS NOTES
History of Present Illness  Bariatric MNT [facility] Screening Preop [facility]:     She was not on time she was late by 10 minutes  the appointment lasted: 50 minutes  Her surgeon is Dr Holley Signs  The patient was present at the session and her pt's mother attended the session  The diagnosis/reason for the appointment is: She is morbidly obese with a BMI of 38 6  Diet Order: Nutritional Assesment for Bariatric Surgery  Her goal weight is N/A  She has the following comorbidities: OUMOU, CPAP, Anxiety, Depression, Bipolar, Lap Roseanne, JAQUANY 2006  Labs: She had labs done on 7/13/2017 and they were reviewed  She does not need to monitor her glucose  She does not have a meter to test her blood glucose  Exercise Frequency:  She does not exercise  Relationship to food: She eats when she is bored and grazes  She knows the difference between being comfortably full and uncomfortably full and knows the difference between being stuffed and comfortably full  She felt/thought they felt her best at 140-150# pounds she last weighed this several years ago  She completed a food journal She drinks 0-2 cups of water daily She drinks 4+ caffienated beverages daily Her motivators are:pt wants to improve health and quality of life  Her obesity/being overweight is related to positive energy balance and is evidenced by: BMI=38 6, pt reports sedentary lifestyle, no structured exercise, and boredom eating/grazing  Knowledge Deficit Prior to Education  She pt had previous bariatric surgery 11 years ago  Barriers to education: motivation  Medical Nutrition Therapy Intervention: Individualized Meal Plan, Daily Food /Exercise Diary, Lifestyle /Behavior Modification Techniques, Basic Pathophysiology of Obesity, Checklist of the Overview of Lesson Plans and Surgical changes to Stomach/GI     Area's Reviewed: Post - surgery goal weight, Web forum, Lifestyle Thomas Noble Modification Techniques, Lesson Plans in [place] [employer] [facility] ( hand out for on-line & smart phone) and Pre- surgery goals /rules  Brief Review of Vitamins, diet progression for post-op and Pathophysiology of Obesity  Her comprehension of the presented material was good  Her receptivity to the presented material was good  Her motivation was good  Provided: Nutrition Guidelines for Gastric Surgery, Food Journaling Application handout, Bariatric Program Pre- Surgery Nutrition and Goals  Goals: Her set goal for physical activity is walk , for 30-60 minutes, 5-7 days a week  Review [employer] in [place]   Post Surgery: She will adhere to the diet progression: remain hydrated, consume adequate protein; and take vitamins as outlined in guidelines  Patient is a 56year old who is here for nutritional evaluation for weight loss surgery  I reviewed co-morbidities and medications with patient  She first recalls having problems with weight gain as a child/young adult  She has dieted in the past with variable success but would regain the weight back  She had Lap RNY GBP in 2006 at [facility]  Pt's pre-RNY wt was 298  Pt reached a low weight of 132#, then maintained in the 140's until a few years ago when her activity level decreased and she bagan drinking alcoholic beverages  For her personal pre-op goals she will: begin practicing the 30/60 rule, and begin eating on a schedule of three meals and one snack daily at 8:30am, 12:30pm, 4:30pm, and 8:30pm  She plans to food journal to track her intake  Provided pt with information on [employer] jess[employer]  She was instructed on the importance of consistent vitamin and mineral intake after her surgery to prevent deficiencies  She currently takes a daily womens multivitamin, iron once daily, and calcium citrate once daily  Reviewed importance of support after surgery and discussed the [employer], facebook page, pep rally, and support group   Patient states adequate knowledge of nutrition, exercise, and behavior modification required for long term success  Pt  is recommended for surgery and is aware to practice lifestyle modification, complete all six lesson plans in bariatric manual, and complete two-week liver shrinking diet prior to surgery  Recommendations: She was provided contact information for any questions  She is recommended for surgery  ~He/She needs additional education  She states adequate knowledge of nutrition, exercise, and behavior modification  She will attend a Team Meeting approximately 2-3 weeks prior to surgery  Active Problems    1  Anxiety (300 00) (F41 9)   2  Bipolar disorder (296 80) (F31 9)   3  Depression with anxiety (300 4) (F41 8)   4  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 3) (K91 2,Z90 3)   5  Morbid obesity (278 01) (E66 01)   6  Nausea (787 02) (R11 0)   7  Obstructive sleep apnea (327 23) (G47 33)   8  OUMOU on CPAP (327 23,V46 8) (G47 33,Z99 89)   9  Polycystic ovarian syndrome (256 4) (E28 2)   10  Recurrent major depressive disorder, remission status unspecified (296 30) (F33 9)   11  S/P bariatric surgery (V45 86) (Z98 84)   12  Screening for lipoid disorders (V77 91) (Z13 220)   13  Shortness of breath (786 05) (R06 02)    Surgical History    1  History of Cholecystectomy   2  History of Gastric Surgery For Morbid Obesity    Family History  Mother    1  Family history of bipolar disorder (V17 0) (Z81 8)   2  Family history of diabetes mellitus (V18 0) (Z83 3)  Father    3  Family history of hypertension (V17 49) (Z82 49)  Maternal Grandmother    4  Family history of bipolar disorder (V17 0) (Z81 8)   5  Family history of diabetes mellitus (V18 0) (Z83 3)   6  Family history of malignant neoplasm of breast (V16 3) (Z80 3)   7  Family history of malignant neoplasm of ovary (V16 41) (Z80 41)  Aunt    8  Family history of bipolar disorder (V17 0) (Z81 8)   9  Family history of diabetes mellitus (V18 0) (Z83 3)   10   Family history of malignant neoplasm of ovary (V16 41) (Z80 41)    Social History    · Alcohol Use (History)   · Alcoholism in recovery (303 90) (F10 20)   · Former smoker (N79 60) (M80 668)   · Denied: History of drug use   · Never a smoker    Current Meds   1  BuPROPion HCl ER (SR) 150 MG Oral Tablet Extended Release 12 Hour; Therapy: 06MRG9647 to Recorded   2  Calcium Citrate CAPS; Therapy: (Recorded:87Orz1091) to Recorded   3  Carafate 1 GM/10ML Oral Suspension; Therapy: (Recorded:04Oct2017) to Recorded   4  CarBAMazepine 200 MG Oral Tablet; TAKE 3 TABLET Twice daily; Therapy: 93SRM6387 to (Evaluate:10Vod8185); Last :75ZTX4933 Ordered   5  HydrOXYzine HCl - 50 MG Oral Tablet; TAKE 1 TABLET 3-4 TIMES DAILY; Therapy: 69WTN5580 to (Evaluate:50Uaj1133)  Requested for: 36KMO5689; Last   Rx:15Jun2017 Ordered   6  Iron Supplement TABS; Therapy: (Recorded:87Wjn3237) to Recorded   7  Methylphenidate HCl - 5 MG Oral Tablet; TAKE 1 TABLET 3 TIMES DAILY @ 800, 1200,   1600; Therapy: 79VDF2887 to (Evaluate:52Ukm0780); Last Rx:15Jun2017 Ordered   8  Multi-Day Oral Tablet; Therapy: (Recorded:28Pkh5538) to Recorded   9  Omeprazole 20 MG Oral Capsule Delayed Release; Therapy: (Recorded:04Oct2017) to Recorded   10  QUEtiapine Fumarate 50 MG Oral Tablet; Therapy: 23CGJ1117 to Recorded   11  Sucralfate 1 GM Oral Tablet; TAKE 1 TABLET 4 TIMES A DAY; Therapy: 06VJG9784 to (Last Rx:05Oct2017)  Requested for: 05Oct2017; Status: ACTIVE -    Retrospective By Protocol Authorization Ordered   12  Venlafaxine HCl  MG Oral Capsule Extended Release 24 Hour; Take 1 capsule    twice daily; Therapy: 33CFS6542 to (Evaluate:63Cmh2372); Last Rx:15Jun2017 Ordered   13  Vitamin D TABS; Therapy: (Recorded:40Yys2830) to Recorded    Allergies    1   Percocet TABS    Vitals  Signs   Recorded: 88AOO5187 12:14PM   Height: 5 ft 5 5 in  Weight: 235 lb 4 8 oz  BMI Calculated: 38 56  BSA Calculated: 2 13    Future Appointments    Date/Time Provider Specialty Site   11/08/2017 01:00 PM Buddy Martinez MD Internal Medicine [facility]     Signatures   Electronically signed by : JAXSON Wetzel RD; Oct 30 2017 12:16PM EST                       (Author)    Electronically signed by :  JOSÉ MANUEL Booker ; Nov 7 2017  9:33AM EST

## 2018-01-12 NOTE — PROGRESS NOTES
Pt to clinic for 5 of 8 venofer infusion and last Vit B12 injection, pt offers no complaints at this time, aware of next visit declines avs

## 2018-01-13 VITALS
BODY MASS INDEX: 40.01 KG/M2 | WEIGHT: 240.13 LBS | HEIGHT: 65 IN | DIASTOLIC BLOOD PRESSURE: 80 MMHG | HEART RATE: 88 BPM | SYSTOLIC BLOOD PRESSURE: 124 MMHG | TEMPERATURE: 97.7 F

## 2018-01-14 VITALS
WEIGHT: 238.5 LBS | SYSTOLIC BLOOD PRESSURE: 132 MMHG | HEIGHT: 66 IN | HEART RATE: 80 BPM | TEMPERATURE: 98 F | BODY MASS INDEX: 38.33 KG/M2 | RESPIRATION RATE: 18 BRPM | DIASTOLIC BLOOD PRESSURE: 84 MMHG

## 2018-01-14 VITALS
RESPIRATION RATE: 18 BRPM | BODY MASS INDEX: 38.65 KG/M2 | DIASTOLIC BLOOD PRESSURE: 82 MMHG | SYSTOLIC BLOOD PRESSURE: 128 MMHG | HEART RATE: 86 BPM | TEMPERATURE: 98.3 F | WEIGHT: 240.5 LBS | HEIGHT: 66 IN

## 2018-01-15 NOTE — CONSULTS
Chief Complaint  Pt  here for cardiac clearance prior to bariatric surgery with Dr Zulma Ivan  Pt  has some SOB with exertion  History of Present Illness  Cardiology HPI Free Text Note Form  [facility]: Ms Galeano Console is here for preoperative clearance prior to bariatric surgery  Her past medical history is significant for obesity s/p gastric surgery in 2006, OUMOU on CPAP, bipolar disorder, PCOS  Patient underwent gastric surgery for obesity in 2006 but restarted gaining weight and is now scheduled for revision of her gastric pouch  She has no past medical history of hypertension, diabetes or dyslipidemia  No cardiac symptoms of chest pain, pressure, shortness of breath or swelling of lower legs  Remote history of alcohol use quit 4 years ago  No family history of premature CAD-father with hypertension  Review of Systems      Cardiac: as noted in HPI  Skin: No complaints of nonhealing sores or skin rash  Genitourinary: no frequent urination at night and no difficult urination   General: No complaints of trouble sleeping, lack of energy, fatigue, appetite changes, weight changes, fever, frequent infections, or night sweats  Respiratory: No complaints of shortness of breath, cough with sputum, or wheezing  Gastrointestinal: no nausea, no vomiting, no constipation and no abdonimal pain   Musculoskeletal: No complaints of arthritis, back pain, or painfull swelling  ROS reviewed  Active Problems    1  Anxiety (300 00) (F41 9)   2  Bipolar disorder (296 80) (F31 9)   3  Depression with anxiety (300 4) (F41 8)   4  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 3) (K91 2,Z90 3)   5  Morbid obesity (278 01) (E66 01)   6  Nausea (787 02) (R11 0)   7  Obstructive sleep apnea (327 23) (G47 33)   8  OUMOU on CPAP (327 23,V46 8) (G47 33,Z99 89)   9  Polycystic ovarian syndrome (256 4) (E28 2)   10  Recurrent major depressive disorder, remission status unspecified (296 30) (F33 9)   11   S/P bariatric surgery (V45 86) (Z98 84)   12  Screening for lipoid disorders (V77 91) (Z13 220)   13  Shortness of breath (786 05) (R06 02)    Past Medical History    The active problems and past medical history were reviewed and updated today  Surgical History    · History of Cholecystectomy   · History of Gastric Surgery For Morbid Obesity    The surgical history was reviewed and updated today  Family History    · Family history of bipolar disorder (V17 0) (Z81 8)   · Family history of diabetes mellitus (V18 0) (Z83 3)    · Family history of hypertension (V17 49) (Z82 49)    · Family history of bipolar disorder (V17 0) (Z81 8)   · Family history of diabetes mellitus (V18 0) (Z83 3)   · Family history of malignant neoplasm of breast (V16 3) (Z80 3)   · Family history of malignant neoplasm of ovary (V16 41) (Z80 41)    · Family history of bipolar disorder (V17 0) (Z81 8)   · Family history of diabetes mellitus (V18 0) (Z83 3)   · Family history of malignant neoplasm of ovary (V16 41) (Z80 41)    The family history was reviewed and updated today  Social History    · Alcohol Use (History)   · Alcoholism in recovery (303 90) (F10 20)   · Former smoker (R07 87) (R10 834)   · Denied: History of drug use   · Never a smoker  The social history was reviewed and updated today  Current Meds   1  BuPROPion HCl ER (SR) 150 MG Oral Tablet Extended Release 12 Hour; Therapy: 20VSR8571 to Recorded   2  Calcium Citrate CAPS; Therapy: (Recorded:45Mdh3483) to Recorded   3  Carafate 1 GM/10ML Oral Suspension; Therapy: (Recorded:04Oct2017) to Recorded   4  CarBAMazepine 200 MG Oral Tablet; TAKE 1 TABLET TWICE DAILY; Therapy: 95LDB1112 to Recorded   5  Iron Supplement TABS; Therapy: (Recorded:99Kbu9317) to Recorded   6  Methylphenidate HCl - 5 MG Oral Tablet; TAKE 1 TABLET 3 TIMES DAILY @ 800, 1200,   1600; Therapy: 54OJJ0703 to (Evaluate:16Pry5193); Last Rx:15Jun2017 Ordered   7  Multi-Day Oral Tablet;    Therapy: (Recorded:56Cgk5591) to Recorded   8  Omeprazole 20 MG Oral Capsule Delayed Release; Therapy: (448 6178) to Recorded   9  QUEtiapine Fumarate 50 MG Oral Tablet; Therapy: 29FDM1838 to Recorded   10  Sucralfate 1 GM Oral Tablet; TAKE 1 TABLET 4 TIMES A DAY; Therapy: 86ROT9875 to (Last Rx:05Oct2017)  Requested for: 05Oct2017; Status: ACTIVE -    Retrospective By Protocol Authorization Ordered   11  Venlafaxine HCl  MG Oral Capsule Extended Release 24 Hour; Take 1 capsule    twice daily; Therapy: 64PSF0488 to (Evaluate:16Dli7885); Last Rx:15Jun2017 Ordered   12  Vitamin D TABS; Therapy: (Recorded:37Ltp2646) to Recorded    The medication list was reviewed and updated today  Allergies    1  Percocet TABS    Vitals  Signs    Heart Rate: 96, ApicalPulse Quality: Regular, ApicalSystolic: 729, RUE, SittingDiastolic: 80, RUE, SittingBP Cuff Size: LargeHeight: 5 ft 5 inWeight: 238 lb BMI Calculated: 39 61BSA Calculated: 2 13    Physical Exam    Constitutional Obese women, in no apparent distress  Eyes   Conjunctiva and Sclera examination: Conjunctiva pink, sclera anicteric  Neck   Neck and thyroid: Normal, supple, trachea midline, no thyromegaly  Pulmonary   Auscultation of lungs: Clear to auscultation, no rales, no rhonchi, no wheezing, good air movement  Cardiovascular   Auscultation of heart: Normal rate and rhythm, normal S1 and S2, no murmurs  Carotid pulses: Normal, 2+ bilaterally  Peripheral vascular exam: Normal pulses throughout, no tenderness, erythema or swelling  Pedal pulses: Normal, 2+ bilaterally  Examination of extremities for edema and/or varicosities: Normal     Abdomen   Abdomen: Non-tender and no distention  Skin - Skin and subcutaneous tissue: Normal without rashes or lesions  Skin is warm and well perfused, normal turgor      Psychiatric - Orientation to person, place, and time: Normal  Mood and affect: Normal       Results/Data  I personally reviewed the recording/images in the office today  My interpretation follows  Lab Review: Lipid panel- July 2017- [facility], LDL-97, HDL-71, TG-172     Rhythm and rate:  normal sinus rhythm  P-waves:   the P wave is normal    QRS: the QRS is normal Normal sinus rhythm  Low voltage  Normal axis and intervals  Nonspecific ST-T wave abnormality  Assessment    1  Preoperative clearance (V72 84) (Z01 818)   2  Morbid obesity (278 01) (E66 01)   3  OUMOU on CPAP (327 23,V46 8) (G47 33,Z99 89)    Plan     Morbid obesity    · EKG/ECG- POC; Status:Complete;   Done: 91PSZ6925   Perform: In Office; YXM:52QRB3114; Last Updated [place] Ape; 11/8/2017 1:08:55 PM;Ordered; For:Morbid obesity; Ordered By:Isabel Huang;    Discussion/Summary    55 y/o women with past medical history of obesity, OUMOU on CPAP, bipolar disorder who is here for preoperative clearance  1  Obesity- Scheduled for revision of bariatric surgery possibly in December  Her RCRI is 0 which translates to a risk of 0 5% risk of cardiac event during surgery  She is at acceptable risk for surgery  EKG done in the office- normal  No further cardiac workup needed  Lipid panel reviewed from July 2017  Advised to have a repeat in one year  Advised to exercise regularly atleast five times/week-20-30min  Counselled on dietary modifications  2  OUMOU on CPAP- Advised to be compliant with CPAP  Patient seen and examined, discussed with Fellow  heart: regular, Low risk planned surgery would proceed without cardiac contraindication, prior index surgery few years ago, no cardiac issues  The patient was counseled regarding instructions for management, risk factor reductions        Signatures   Electronically signed by : Adella Cranker, MD; Nov 8 2017  1:58PM EST                       (Author)    Electronically signed by : Shiv Watt DO; Nov 8 2017  3:35PM EST                       (Author)

## 2018-01-16 RX ORDER — SODIUM CHLORIDE 9 MG/ML
20 INJECTION, SOLUTION INTRAVENOUS CONTINUOUS
Status: DISCONTINUED | OUTPATIENT
Start: 2018-01-17 | End: 2018-01-20 | Stop reason: HOSPADM

## 2018-01-16 RX ORDER — CYANOCOBALAMIN 1000 UG/ML
1000 INJECTION INTRAMUSCULAR; SUBCUTANEOUS ONCE
Status: DISCONTINUED | OUTPATIENT
Start: 2018-01-17 | End: 2018-01-17

## 2018-01-16 NOTE — RESULT NOTES
Dear Ambreen Schroeder,   Your test results have returned and are listed below:      Discussion/Summary  Your results show some abnormalities  Your bloodwork from 11/8/2017 shows that you are anemic and have low iron stores  Your iron absorption is significantly affected by weight loss surgery, so it is important to normalize your levels prior to surgery  Recommend that you follow up with hematology for management of your iron stores  I have enclosed information on the [facility]  After your consultation, please have hematology forward a consult letter to the bariatric office with your evaluation and treatment plan  Your bloodwork from 7/13/2017 shows that your vitamin B12 level is low, which can affect your nerve health  Recommend that you take 500 mcg of vitamin B12 sublingually (under the tongue) per day  Your vitamin D level was also low, which can affect your bone health  Recommend that you take 4000 IU of vitamin D3 per day, which is found over the counter  In addition, you need to take 1200 to 1500 mg of calcium per day, in divided doses of 500 to 600 mg each  Your level will be checked again after surgery  Please keep your regularly scheduled follow-up appointment  If you do not have a follow-up scheduled, please call the office to schedule a follow-up visit  If you have any questions, please don't hesitate to call the office  Sincerely,      Signatures   Electronically signed by : JAXSON Munoz RD; Nov 14 2017  1:34PM EST                       (Author)    Electronically signed by :  JOSÉ MANUEL Sofia ; Dec  4 2017  7:54AM EST

## 2018-01-17 ENCOUNTER — HOSPITAL ENCOUNTER (OUTPATIENT)
Dept: INFUSION CENTER | Facility: CLINIC | Age: 48
Discharge: HOME | End: 2018-01-17
Payer: COMMERCIAL

## 2018-01-17 VITALS
HEART RATE: 94 BPM | TEMPERATURE: 97.9 F | SYSTOLIC BLOOD PRESSURE: 142 MMHG | DIASTOLIC BLOOD PRESSURE: 86 MMHG | RESPIRATION RATE: 18 BRPM

## 2018-01-17 PROCEDURE — 96365 THER/PROPH/DIAG IV INF INIT: CPT

## 2018-01-17 RX ADMIN — SODIUM CHLORIDE 20 ML/HR: 0.9 INJECTION, SOLUTION INTRAVENOUS at 13:25

## 2018-01-17 RX ADMIN — IRON SUCROSE 200 MG: 20 INJECTION, SOLUTION INTRAVENOUS at 13:32

## 2018-01-17 NOTE — PROGRESS NOTES
Pt tolerated her venofer without any adverse reactions  Next appointment confirmed   Pt declined avs

## 2018-01-18 RX ORDER — CYANOCOBALAMIN 1000 UG/ML
1000 INJECTION INTRAMUSCULAR; SUBCUTANEOUS ONCE
Status: COMPLETED | OUTPATIENT
Start: 2018-01-20 | End: 2018-01-20

## 2018-01-18 RX ORDER — SODIUM CHLORIDE 9 MG/ML
20 INJECTION, SOLUTION INTRAVENOUS CONTINUOUS
Status: DISCONTINUED | OUTPATIENT
Start: 2018-01-20 | End: 2018-01-23 | Stop reason: HOSPADM
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
## 2018-01-19 NOTE — PROGRESS NOTES
Assessment   1  Morbid obesity (278 01) (E66 01)   2  S/P bariatric surgery (V45 86) (L78 75)    Discussion/Summary   Discussion Summary:    Patient is 53 y F w/ history of RYGB  Had weight loss initially but has since gained weight  Her work up has revealed a marginal ulcer seen on EGD 10/4 and a large non-excluded fundus  She is here to discuss revision  She does have a history of alcoholism and is concerned about taking narcotics post-op out of fear that she may develop a new addiction  We discussed the risks and benefits of the operation we would perform which is a robotic-assisted laparoscopic revision of Claudette-en-y gastric bypass, possible truncal vagotomy, intraoperative EGD  We discuss the risks which include anastomotic breakdown, inability to lose weight, marginal ulcer return, leak, bleeding, increased operative time from the robot and associated risks, hernia, pneumonia, alcohol intolerance, bowel obstruction open procedure, hypoglycemia, and death  Patient wishes to proceed and signed the consent paperwork  Self Referrals:    Self Referrals: No      Chief Complaint   Chief Complaint Free Text Note Form: Patient is in the office for final h&p rev, RYGB      History of Present Illness   HPI: 56 y F w/ ongoing pain after RYGB who presents for pre-op evaluation for revision  She has a large non-excluded fundus and an ulcer  Chronic pain has been a problem  Bariatric Surgery Pre-op History and Physical (Brief) [facility]: The patient is being seen for a pre-op history and physical visit for bariatric surgery  The patient is currently experiencing symptoms  Review of Systems   Complete-Female:      Constitutional: no fever  Eyes: no eye pain  ENT: no nosebleeds  Cardiovascular: no chest pain  Respiratory: no cough  Gastrointestinal: abdominal pain, but-- no nausea-- and-- no vomiting  Genitourinary: no dysuria  Musculoskeletal: no arthralgias        Integumentary: no rashes  Neurological: no numbness  Psychiatric: no anxiety  no feelings of weakness      Hematologic/Lymphatic: no tendency for easy bleeding  Active Problems   1  Anemia, iron deficiency (280 9) (D50 9)   2  Anxiety (300 00) (F41 9)   3  B12 deficiency (266 2) (E53 8)   4  Bipolar disorder (296 80) (F31 9)   5  Depression with anxiety (300 4) (F41 8)   6  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 3) (K91 2,Z90 3)   7  Morbid obesity (278 01) (E66 01)   8  Nausea (787 02) (R11 0)   9  Obstructive sleep apnea (327 23) (G47 33)   10  OUOMU on CPAP (327 23,V46 8) (G47 33,Z99 89)   11  Polycystic ovarian syndrome (256 4) (E28 2)   12  Preoperative clearance (V72 84) (Z01 818)   13  Recurrent major depressive disorder, remission status unspecified (296 30) (F33 9)   14  S/P bariatric surgery (V45 86) (Z98 84)   15  Screening for lipoid disorders (V77 91) (Z13 220)   16  Shortness of breath (786 05) (R06 02)    Past Medical History   Active Problems And Past Medical History Reviewed: The active problems and past medical history were reviewed and updated today  Surgical History   1  History of Cholecystectomy   2  History of Gastric Surgery For Morbid Obesity   3  History of Panniculectomy  Surgical History Reviewed: The surgical history was reviewed and updated today  Family History   Mother    1  Family history of bipolar disorder (V17 0) (Z81 8)   2  Family history of diabetes mellitus (V18 0) (Z83 3)  Father    3  Family history of hypertension (V17 49) (Z82 49)  Maternal Grandmother    4  Family history of bipolar disorder (V17 0) (Z81 8)   5  Family history of diabetes mellitus (V18 0) (Z83 3)   6  Family history of malignant neoplasm of breast (V16 3) (Z80 3)   7  Family history of malignant neoplasm of ovary (V16 41) (Z80 41)  Aunt    8  Family history of bipolar disorder (V17 0) (Z81 8)   9  Family history of diabetes mellitus (V18 0) (Z83 3)   10   Family history of malignant neoplasm of ovary (V16 41) (Z80 41)  Family History Reviewed: The family history was reviewed and updated today  Social History    · Alcohol Use (History)   · Alcoholism in recovery (303 90) (F10 20)   · Former smoker (K22 48) (Z87 891)   · Denied: History of drug use   · Never a smoker  Social History Reviewed: The social history was reviewed and updated today  Current Meds    1  BuPROPion HCl ER (SR) 150 MG Oral Tablet Extended Release 12 Hour; Therapy: 17EXG4071 to Recorded   2  Calcium Citrate CAPS; Therapy: (Recorded:51Wip8395) to Recorded   3  CarBAMazepine 200 MG Oral Tablet; TAKE 1 TABLET TWICE DAILY; Therapy: 77KLV6534 to Recorded   4  Iron Supplement TABS; Therapy: (Recorded:23Mvz9677) to Recorded   5  Methylphenidate HCl - 5 MG Oral Tablet; TAKE 1 TABLET 3 TIMES DAILY @ 800, 1200,     1600; Therapy: 75XTR7822 to (Evaluate:22Fei0781); Last Rx:15Jun2017 Ordered   6  Multi-Day Oral Tablet; Therapy: (Recorded:21Idc2558) to Recorded   7  Omeprazole 20 MG Oral Capsule Delayed Release; TAKE 1 CAPSULE BY MOUTH     EVERY DAY; Therapy: 95CJN1646 to (Evaluate:17Jan2018)  Requested for: 70ZBP3200; Last     Rx:18Dec2017 Ordered   8  QUEtiapine Fumarate 50 MG Oral Tablet; Therapy: 02MGY6575 to Recorded   9  Sucralfate 1 GM Oral Tablet; TAKE 1 TABLET BY MOUTH FOUR TIMES DAILY; Therapy: 37ABL8995 to ([phone])  Requested for: 74DJQ4755; Last     Rx:04Jan2018 Ordered   10  Sucralfate 1 GM Oral Tablet; TAKE 1 TABLET BY MOUTH FOUR TIMES DAILY; Therapy: 59JNM6564 to (Evaluate:61Kac0537)  Requested for: 05YUF1996; Last      Rx:04Jan2018; Status: ACTIVE - Retrospective By Protocol Authorization Ordered   11  Venlafaxine HCl  MG Oral Capsule Extended Release 24 Hour; Take 1 capsule      twice daily; Therapy: 23QJE2987 to (Evaluate:54Mng5189); Last Rx:15Jun2017 Ordered   12   Vicodin 5-300 MG Oral Tablet; TAKE 1 TABLET EVERY 4 TO 6 HOURS AS NEEDED FOR      PAIN;      Therapy: 88JAQ7732 to (Complete:13Jan2018); Last Rx:08Jan2018; Status: ACTIVE -      Retrospective By Protocol Authorization Ordered   13  Vitamin D TABS; Therapy: (Recorded:82Afh3820) to Recorded  Medication List Reviewed: The medication list was reviewed and updated today  Allergies   1  Percocet TABS    Vitals   Vital Signs    Recorded: 42GCW5206 03:45PM   Temperature 99 4 F   Heart Rate 74   Respiration 18   Systolic 530   Diastolic 98   Height 5 ft 5 5 in   Weight 235 lb 0 5 oz   BMI Calculated 38 52   BSA Calculated 2 13     Physical Exam        Constitutional      General appearance: No acute distress, well appearing and well nourished  Eyes      Conjunctiva and lids: No swelling, erythema or discharge  Ears, Nose, Mouth, and Throat      External inspection of ears and nose: Normal        Pulmonary      Respiratory effort: No increased work of breathing or signs of respiratory distress  Cardiovascular      Palpation of heart: Normal PMI, no thrills  Abdomen healed surgical scars  Lymphatic      Palpation of lymph nodes in neck: No lymphadenopathy  Musculoskeletal      Gait and station: Normal        Skin      Skin and subcutaneous tissue: Normal without rashes or lesions  Neurologic      Cranial nerves: Cranial nerves 2-12 intact  Psychiatric      Orientation to person, place, and time: Normal           Future Appointments      Date/Time Provider Specialty Site   06/20/2018 01:20 PM JOSÉ MANUEL Campbell , [facility] Hematology Oncology [facility] [place]   02/02/2018 10:15 AM JOSÉ MANUEL Choi  General Surgery [facility]     Signatures    Electronically signed by : Indigo Mancini MD; Jan 12 2018 12:21AM EST                       (Author)     Electronically signed by :  JOSÉ MANUEL Love ; Jan 18 2018  9:19AM EST                       (Author)

## 2018-01-20 ENCOUNTER — HOSPITAL ENCOUNTER (OUTPATIENT)
Dept: INFUSION CENTER | Facility: CLINIC | Age: 48
Discharge: HOME | End: 2018-01-20
Payer: COMMERCIAL

## 2018-01-20 VITALS
TEMPERATURE: 98.2 F | OXYGEN SATURATION: 98 % | HEART RATE: 64 BPM | SYSTOLIC BLOOD PRESSURE: 116 MMHG | DIASTOLIC BLOOD PRESSURE: 70 MMHG | RESPIRATION RATE: 20 BRPM

## 2018-01-20 PROCEDURE — 96365 THER/PROPH/DIAG IV INF INIT: CPT

## 2018-01-20 PROCEDURE — 96372 THER/PROPH/DIAG INJ SC/IM: CPT

## 2018-01-20 RX ADMIN — CYANOCOBALAMIN 1000 MCG: 1000 INJECTION, SOLUTION INTRAMUSCULAR at 11:20

## 2018-01-20 RX ADMIN — SODIUM CHLORIDE 20 ML/HR: 0.9 INJECTION, SOLUTION INTRAVENOUS at 11:20

## 2018-01-20 RX ADMIN — IRON SUCROSE 200 MG: 20 INJECTION, SOLUTION INTRAVENOUS at 11:19

## 2018-01-22 VITALS
BODY MASS INDEX: 39.65 KG/M2 | WEIGHT: 238 LBS | HEART RATE: 96 BPM | HEIGHT: 65 IN | SYSTOLIC BLOOD PRESSURE: 130 MMHG | DIASTOLIC BLOOD PRESSURE: 80 MMHG

## 2018-01-22 VITALS
WEIGHT: 235.03 LBS | BODY MASS INDEX: 37.77 KG/M2 | RESPIRATION RATE: 18 BRPM | HEART RATE: 74 BPM | DIASTOLIC BLOOD PRESSURE: 98 MMHG | HEIGHT: 66 IN | TEMPERATURE: 99.4 F | SYSTOLIC BLOOD PRESSURE: 130 MMHG

## 2018-01-22 RX ORDER — SODIUM CHLORIDE 9 MG/ML
20 INJECTION, SOLUTION INTRAVENOUS CONTINUOUS
Status: DISCONTINUED | OUTPATIENT
Start: 2018-01-23 | End: 2018-01-26 | Stop reason: HOSPADM

## 2018-01-23 ENCOUNTER — HOSPITAL ENCOUNTER (OUTPATIENT)
Dept: INFUSION CENTER | Facility: CLINIC | Age: 48
Discharge: HOME | End: 2018-01-23
Payer: COMMERCIAL

## 2018-01-23 VITALS
OXYGEN SATURATION: 98 % | DIASTOLIC BLOOD PRESSURE: 82 MMHG | WEIGHT: 240.5 LBS | TEMPERATURE: 98 F | HEIGHT: 65 IN | RESPIRATION RATE: 18 BRPM | HEART RATE: 101 BPM | SYSTOLIC BLOOD PRESSURE: 118 MMHG | BODY MASS INDEX: 40.07 KG/M2

## 2018-01-23 VITALS
HEART RATE: 98 BPM | RESPIRATION RATE: 16 BRPM | DIASTOLIC BLOOD PRESSURE: 69 MMHG | TEMPERATURE: 98.4 F | SYSTOLIC BLOOD PRESSURE: 149 MMHG | OXYGEN SATURATION: 98 %

## 2018-01-23 PROCEDURE — 96365 THER/PROPH/DIAG IV INF INIT: CPT

## 2018-01-23 RX ADMIN — IRON SUCROSE 200 MG: 20 INJECTION, SOLUTION INTRAVENOUS at 11:30

## 2018-01-23 RX ADMIN — SODIUM CHLORIDE 20 ML/HR: 9 INJECTION, SOLUTION INTRAVENOUS at 11:05

## 2018-01-23 NOTE — PROGRESS NOTES
Message  received call from patient who stated that she did contact her psychiatrist  patient requested that another call from our office be made to her psychiatrist office to help in resolving the question regarding patient's past history of alcohol use  Active Problems    1  Anemia, iron deficiency (280 9) (D50 9)   2  Anxiety (300 00) (F41 9)   3  B12 deficiency (266 2) (E53 8)   4  Bipolar disorder (296 80) (F31 9)   5  Depression with anxiety (300 4) (F41 8)   6  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 3) (K91 2,Z90 3)   7  Morbid obesity (278 01) (E66 01)   8  Nausea (787 02) (R11 0)   9  Obstructive sleep apnea (327 23) (G47 33)   10  OUMOU on CPAP (327 23,V46 8) (G47 33,Z99 89)   11  Polycystic ovarian syndrome (256 4) (E28 2)   12  Preoperative clearance (V72 84) (Z01 818)   13  Recurrent major depressive disorder, remission status unspecified (296 30) (F33 9)   14  S/P bariatric surgery (V45 86) (Z98 84)   15  Screening for lipoid disorders (V77 91) (Z13 220)   16  Shortness of breath (786 05) (R06 02)    Current Meds   1  BuPROPion HCl ER (SR) 150 MG Oral Tablet Extended Release 12 Hour; Therapy: 19IOO6258 to Recorded   2  Calcium Citrate CAPS; Therapy: (Recorded:99Fmt1274) to Recorded   3  Carafate 1 GM/10ML Oral Suspension; Therapy: (Recorded:04Oct2017) to Recorded   4  CarBAMazepine 200 MG Oral Tablet; TAKE 1 TABLET TWICE DAILY; Therapy: 92IKY6788 to Recorded   5  Iron Supplement TABS; Therapy: (Recorded:23Cjd6291) to Recorded   6  Methylphenidate HCl - 5 MG Oral Tablet (Ritalin); TAKE 1 TABLET 3 TIMES DAILY @ 800,   1200, 1600; Therapy: 31JRE3065 to (Evaluate:68Kcj1973); Last Rx:15Jun2017 Ordered   7  Multi-Day Oral Tablet; Therapy: (Recorded:95Aqz8768) to Recorded   8  Omeprazole 20 MG Oral Capsule Delayed Release; TAKE 1 CAPSULE BY MOUTH   EVERY DAY;    Therapy: 88XYC3904 to (Evaluate:17Jan2018)  Requested for: 81YJL7307; Last   Rx:23Prt1052; Status: ACTIVE - Retrospective By Protocol Authorization Ordered   9  QUEtiapine Fumarate 50 MG Oral Tablet; Therapy: 95XWM9728 to Recorded   10  Sucralfate 1 GM Oral Tablet; TAKE 1 TABLET 4 TIMES DAILY; Therapy: 97MPX3212 to (Evaluate:07Jan2018)  Requested for: 42HFD7300; Last    Rx:08Dec2017; Status: ACTIVE - Retrospective By Protocol Authorization Ordered   11  Venlafaxine HCl  MG Oral Capsule Extended Release 24 Hour (Effexor XR); Take    1 capsule twice daily; Therapy: 04OYL4763 to (Evaluate:12Dec2017); Last Rx:15Jun2017 Ordered   12  Vitamin D TABS; Therapy: (Recorded:82Zrg0388) to Recorded    Allergies    1  Percocet TABS    Signatures   Electronically signed by :  Doc Guo, FABIMSWMSCHRIS,FABI; Dec 28 2017 10:08AM EST                       (Author)

## 2018-01-23 NOTE — PROGRESS NOTES
Message  placed call to patient who is unavailable to the phone  left message updating patient as to outcome of call with psychiatrist office  Active Problems    1  Anemia, iron deficiency (280 9) (D50 9)   2  Anxiety (300 00) (F41 9)   3  B12 deficiency (266 2) (E53 8)   4  Bipolar disorder (296 80) (F31 9)   5  Depression with anxiety (300 4) (F41 8)   6  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 3) (K91 2,Z90 3)   7  Morbid obesity (278 01) (E66 01)   8  Nausea (787 02) (R11 0)   9  Obstructive sleep apnea (327 23) (G47 33)   10  OUMOU on CPAP (327 23,V46 8) (G47 33,Z99 89)   11  Polycystic ovarian syndrome (256 4) (E28 2)   12  Preoperative clearance (V72 84) (Z01 818)   13  Recurrent major depressive disorder, remission status unspecified (296 30) (F33 9)   14  S/P bariatric surgery (V45 86) (Z98 84)   15  Screening for lipoid disorders (V77 91) (Z13 220)   16  Shortness of breath (786 05) (R06 02)    Current Meds   1  BuPROPion HCl ER (SR) 150 MG Oral Tablet Extended Release 12 Hour; Therapy: 40ALV6186 to Recorded   2  Calcium Citrate CAPS; Therapy: (Recorded:22Hgk4915) to Recorded   3  Carafate 1 GM/10ML Oral Suspension; Therapy: (Recorded:04Oct2017) to Recorded   4  CarBAMazepine 200 MG Oral Tablet; TAKE 1 TABLET TWICE DAILY; Therapy: 53MMX1552 to Recorded   5  Iron Supplement TABS; Therapy: (Recorded:97Cfy9769) to Recorded   6  Methylphenidate HCl - 5 MG Oral Tablet (Ritalin); TAKE 1 TABLET 3 TIMES DAILY @ 800,   1200, 1600; Therapy: 73MVN3625 to (Evaluate:48Lfr6019); Last Rx:15Jun2017 Ordered   7  Multi-Day Oral Tablet; Therapy: (Recorded:98Gbv7738) to Recorded   8  Omeprazole 20 MG Oral Capsule Delayed Release; TAKE 1 CAPSULE BY MOUTH   EVERY DAY; Therapy: 39FGD8680 to (Evaluate:17Jan2018)  Requested for: 84DPH5643; Last   Rx:19Xbw6016; Status: ACTIVE - Retrospective By Protocol Authorization Ordered   9  QUEtiapine Fumarate 50 MG Oral Tablet;    Therapy: 35IMZ0092 to Recorded   10  Sucralfate 1 GM Oral Tablet; TAKE 1 TABLET 4 TIMES DAILY; Therapy: 25GIU5168 to (Evaluate:07Jan2018)  Requested for: 64XAF7626; Last    Rx:08Dec2017; Status: ACTIVE - Retrospective By Protocol Authorization Ordered   11  Venlafaxine HCl  MG Oral Capsule Extended Release 24 Hour (Effexor XR); Take    1 capsule twice daily; Therapy: 58WEW3052 to (Evaluate:12Dec2017); Last Rx:15Jun2017 Ordered   12  Vitamin D TABS; Therapy: (Recorded:49Fnc2035) to Recorded    Allergies    1  Percocet TABS    Signatures   Electronically signed by :  SHARRI TopeteWMSWMSW,FABI; Dec 29 2017 10:59AM EST                       (Author)

## 2018-01-23 NOTE — PROGRESS NOTES
Message  contacted patient's psychiatrist, had not received diagnostic information  they requested we refax form for dr to complete      Active Problems    1  Anemia, iron deficiency (280 9) (D50 9)   2  Anxiety (300 00) (F41 9)   3  B12 deficiency (266 2) (E53 8)   4  Bipolar disorder (296 80) (F31 9)   5  Depression with anxiety (300 4) (F41 8)   6  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 3) (K91 2,Z90 3)   7  Morbid obesity (278 01) (E66 01)   8  Nausea (787 02) (R11 0)   9  Obstructive sleep apnea (327 23) (G47 33)   10  OUMOU on CPAP (327 23,V46 8) (G47 33,Z99 89)   11  Polycystic ovarian syndrome (256 4) (E28 2)   12  Preoperative clearance (V72 84) (Z01 818)   13  Recurrent major depressive disorder, remission status unspecified (296 30) (F33 9)   14  S/P bariatric surgery (V45 86) (Z98 84)   15  Screening for lipoid disorders (V77 91) (Z13 220)   16  Shortness of breath (786 05) (R06 02)    Current Meds   1  BuPROPion HCl ER (SR) 150 MG Oral Tablet Extended Release 12 Hour; Therapy: 35OAC3792 to Recorded   2  Calcium Citrate CAPS; Therapy: (Recorded:02Pwz1655) to Recorded   3  Carafate 1 GM/10ML Oral Suspension; Therapy: (Recorded:04Oct2017) to Recorded   4  CarBAMazepine 200 MG Oral Tablet; TAKE 1 TABLET TWICE DAILY; Therapy: 89EFF0197 to Recorded   5  Iron Supplement TABS; Therapy: (Recorded:23And2499) to Recorded   6  Methylphenidate HCl - 5 MG Oral Tablet (Ritalin); TAKE 1 TABLET 3 TIMES DAILY @ 800,   1200, 1600; Therapy: 21HTB2600 to (Evaluate:45Hsi9929); Last Rx:15Jun2017 Ordered   7  Multi-Day Oral Tablet; Therapy: (Recorded:37Yvx1484) to Recorded   8  Omeprazole 20 MG Oral Capsule Delayed Release; TAKE 1 CAPSULE BY MOUTH   EVERY DAY; Therapy: 55JYH1522 to (Evaluate:17Jan2018)  Requested for: 08QYF4836; Last   Rx:10Wiq0690; Status: ACTIVE - Retrospective By Protocol Authorization Ordered   9  QUEtiapine Fumarate 50 MG Oral Tablet; Therapy: 93AKK2036 to Recorded   10  Sucralfate 1 GM Oral Tablet; TAKE 1 TABLET 4 TIMES DAILY; Therapy: 05ONR0705 to (Evaluate:07Jan2018)  Requested for: 95FKZ4836; Last    Rx:08Dec2017; Status: ACTIVE - Retrospective By Protocol Authorization Ordered   11  Venlafaxine HCl  MG Oral Capsule Extended Release 24 Hour (Effexor XR); Take    1 capsule twice daily; Therapy: 10AWC2384 to (Evaluate:12Dec2017); Last Rx:15Jun2017 Ordered   12  Vitamin D TABS; Therapy: (Recorded:86Pws2211) to Recorded    Allergies    1  Percocet TABS    Signatures   Electronically signed by :  Isaura Galvan, SHARRIWMSWMSCHRIS,FABI; Dec 19 2017  1:14PM EST                       (Author)

## 2018-01-23 NOTE — PROGRESS NOTES
Message    12/1/2017 2:01:06 PM - Marzena Ogden  placed call to patient to inform her that request and release were faxed to her psychiatrist office  Active Problems    1  Anxiety (300 00) (F41 9)   2  Bipolar disorder (296 80) (F31 9)   3  Depression with anxiety (300 4) (F41 8)   4  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 3) (K91 2,Z90 3)   5  Morbid obesity (278 01) (E66 01)   6  Nausea (787 02) (R11 0)   7  Obstructive sleep apnea (327 23) (G47 33)   8  OUMOU on CPAP (327 23,V46 8) (G47 33,Z99 89)   9  Polycystic ovarian syndrome (256 4) (E28 2)   10  Preoperative clearance (V72 84) (Z01 818)   11  Recurrent major depressive disorder, remission status unspecified (296 30) (F33 9)   12  S/P bariatric surgery (V45 86) (Z98 84)   13  Screening for lipoid disorders (V77 91) (Z13 220)   14  Shortness of breath (786 05) (R06 02)    Current Meds   1  BuPROPion HCl ER (SR) 150 MG Oral Tablet Extended Release 12 Hour; Therapy: 22BZK0242 to Recorded   2  Calcium Citrate CAPS; Therapy: (Recorded:79Gev9342) to Recorded   3  Carafate 1 GM/10ML Oral Suspension; Therapy: (Recorded:04Oct2017) to Recorded   4  CarBAMazepine 200 MG Oral Tablet; TAKE 1 TABLET TWICE DAILY; Therapy: 33QXB0071 to Recorded   5  Iron Supplement TABS; Therapy: (Recorded:26Acl8635) to Recorded   6  Methylphenidate HCl - 5 MG Oral Tablet (Ritalin); TAKE 1 TABLET 3 TIMES DAILY @ 800,   1200, 1600; Therapy: 63DPT1327 to (Evaluate:64Vnd3774); Last Rx:15Jun2017 Ordered   7  Multi-Day Oral Tablet; Therapy: (Recorded:23Ljg9527) to Recorded   8  Omeprazole 20 MG Oral Capsule Delayed Release; TAKE 1 CAPSULE BY MOUTH   EVERY DAY; Therapy: 69MRU8796 to (Evaluate:98Bji7749)  Requested for: 40DZA8336; Last   Rx:09Nov2017 Ordered   9  QUEtiapine Fumarate 50 MG Oral Tablet; Therapy: 12QIA8593 to Recorded   10  Sucralfate 1 GM Oral Tablet; TAKE 1 TABLET 4 TIMES A DAY;     Therapy: 80EAZ9556 to (Last Rx:05Oct2017)  Requested for: 24MLB0243 Ordered   11  Venlafaxine HCl  MG Oral Capsule Extended Release 24 Hour (Effexor XR); Take    1 capsule twice daily; Therapy: 92WNZ0723 to (Evaluate:45Ozl0292); Last Rx:15Jun2017 Ordered   12  Vitamin D TABS; Therapy: (Recorded:44Shz5211) to Recorded    Allergies    1  Percocet TABS    Signatures   Electronically signed by :  Renato Horta, LCSWMSWMSCHRIS,FABI; Dec  1 2017  3:01PM EST                       (Author)

## 2018-01-23 NOTE — PROGRESS NOTES
Message  placed call to patient's psychiatrist to request information  They will require a signed release and a request for information needed faxed to their office  Active Problems    1  Anxiety (300 00) (F41 9)   2  Bipolar disorder (296 80) (F31 9)   3  Depression with anxiety (300 4) (F41 8)   4  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 3) (K91 2,Z90 3)   5  Morbid obesity (278 01) (E66 01)   6  Nausea (787 02) (R11 0)   7  Obstructive sleep apnea (327 23) (G47 33)   8  OUMOU on CPAP (327 23,V46 8) (G47 33,Z99 89)   9  Polycystic ovarian syndrome (256 4) (E28 2)   10  Preoperative clearance (V72 84) (Z01 818)   11  Recurrent major depressive disorder, remission status unspecified (296 30) (F33 9)   12  S/P bariatric surgery (V45 86) (Z98 84)   13  Screening for lipoid disorders (V77 91) (Z13 220)   14  Shortness of breath (786 05) (R06 02)    Current Meds   1  BuPROPion HCl ER (SR) 150 MG Oral Tablet Extended Release 12 Hour; Therapy: 42LDC6846 to Recorded   2  Calcium Citrate CAPS; Therapy: (Recorded:70Jou7224) to Recorded   3  Carafate 1 GM/10ML Oral Suspension; Therapy: (Recorded:04Oct2017) to Recorded   4  CarBAMazepine 200 MG Oral Tablet; TAKE 1 TABLET TWICE DAILY; Therapy: 36KCA6745 to Recorded   5  Iron Supplement TABS; Therapy: (Recorded:56Xmk9222) to Recorded   6  Methylphenidate HCl - 5 MG Oral Tablet (Ritalin); TAKE 1 TABLET 3 TIMES DAILY @ 800,   1200, 1600; Therapy: 27YHM4275 to (Evaluate:86Oxq9859); Last Rx:15Jun2017 Ordered   7  Multi-Day Oral Tablet; Therapy: (Recorded:11Frq3973) to Recorded   8  Omeprazole 20 MG Oral Capsule Delayed Release; TAKE 1 CAPSULE BY MOUTH   EVERY DAY; Therapy: 97WNN6803 to (Evaluate:90Vje5374)  Requested for: 30OKU9441; Last   Rx:09Nov2017 Ordered   9  QUEtiapine Fumarate 50 MG Oral Tablet; Therapy: 84QJB3633 to Recorded   10  Sucralfate 1 GM Oral Tablet; TAKE 1 TABLET 4 TIMES A DAY;     Therapy: 71IYL2611 to (Last Rx:05Oct2017) Requested for: 96DGL2214 Ordered   11  Venlafaxine HCl  MG Oral Capsule Extended Release 24 Hour (Effexor XR); Take    1 capsule twice daily; Therapy: 57MSM5581 to (Evaluate:71Pav6159); Last Rx:15Jun2017 Ordered   12  Vitamin D TABS; Therapy: (Recorded:02Pqy6349) to Recorded    Allergies    1  Percocet TABS    Signatures   Electronically signed by :  SHARRI CollinsWMSWMSCHRIS,FABI; Dec  1 2017 11:50AM EST                       (Author)

## 2018-01-23 NOTE — PROGRESS NOTES
Message  placed call to patient to request a letter from her psychiatrist providing diagnostic code indicating alcohol abuse in recovery  patient will contact them  Active Problems    1  Anxiety (300 00) (F41 9)   2  Bipolar disorder (296 80) (F31 9)   3  Depression with anxiety (300 4) (F41 8)   4  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 3) (K91 2,Z90 3)   5  Morbid obesity (278 01) (E66 01)   6  Nausea (787 02) (R11 0)   7  Obstructive sleep apnea (327 23) (G47 33)   8  OUMOU on CPAP (327 23,V46 8) (G47 33,Z99 89)   9  Polycystic ovarian syndrome (256 4) (E28 2)   10  Preoperative clearance (V72 84) (Z01 818)   11  Recurrent major depressive disorder, remission status unspecified (296 30) (F33 9)   12  S/P bariatric surgery (V45 86) (Z98 84)   13  Screening for lipoid disorders (V77 91) (Z13 220)   14  Shortness of breath (786 05) (R06 02)    Current Meds   1  BuPROPion HCl ER (SR) 150 MG Oral Tablet Extended Release 12 Hour; Therapy: 89JET2371 to Recorded   2  Calcium Citrate CAPS; Therapy: (Recorded:01Baf6477) to Recorded   3  Carafate 1 GM/10ML Oral Suspension; Therapy: (Recorded:04Oct2017) to Recorded   4  CarBAMazepine 200 MG Oral Tablet; TAKE 1 TABLET TWICE DAILY; Therapy: 35OBS8214 to Recorded   5  Iron Supplement TABS; Therapy: (Recorded:94Kni5762) to Recorded   6  Methylphenidate HCl - 5 MG Oral Tablet (Ritalin); TAKE 1 TABLET 3 TIMES DAILY @ 800,   1200, 1600; Therapy: 89TPA2115 to (Evaluate:58Htd8199); Last Rx:15Jun2017 Ordered   7  Multi-Day Oral Tablet; Therapy: (Recorded:99Pqd8071) to Recorded   8  Omeprazole 20 MG Oral Capsule Delayed Release; TAKE 1 CAPSULE BY MOUTH   EVERY DAY; Therapy: 91NLA7820 to (Evaluate:90Njy5329)  Requested for: 14BYO9961; Last   Rx:09Nov2017 Ordered   9  QUEtiapine Fumarate 50 MG Oral Tablet; Therapy: 69QNJ4968 to Recorded   10  Sucralfate 1 GM Oral Tablet; TAKE 1 TABLET 4 TIMES A DAY;     Therapy: 11TMD3102 to (Last Rx:05Oct2017) Requested for: 03EFE7429 Ordered   11  Venlafaxine HCl  MG Oral Capsule Extended Release 24 Hour (Effexor XR); Take    1 capsule twice daily; Therapy: 13CZI2014 to (Evaluate:23Ggb6764); Last Rx:15Jun2017 Ordered   12  Vitamin D TABS; Therapy: (Recorded:77Bvt6200) to Recorded    Allergies    1  Percocet TABS    Signatures   Electronically signed by :  Sarah Beth Parada LCSWMSWMSCHRIS,FABI; Dec  1 2017 11:48AM EST                       (Author)

## 2018-01-23 NOTE — PROGRESS NOTES
Patient to Katlyn Lemus: Offers no complaints at present time: Left FA PIV initiated without incident

## 2018-01-23 NOTE — CONSULTS
History of Present Illness  Erik Lima is a 56year old female seen for initial consultation 12/15/2017 regarding iron deficiency anemia  She underwent gastric bypass surgery in 2006 by Dr Eric Knows  She is being considered for revision by Dr Gallito Garcia  She reports her menstrual cycles are not excessively heavy  Her menses are irregular secondary to PCOS  She states her last was in October lasted 2-3 days  She does experience some early satiety, nausea related to an ulcer  she is taking a PPI and Carafate  She does have fatigue, dyspnea on exertion  She denies lightheadedness or dizziness  she does have oral iron at home but states that she only typically remembers to take it 3/week  11/8/2017 HNL: hemoglobin was 10 7, MCV of 82, RDW 17 4, platelet count 161, white blood cell count 4 4 with normal differential  Iron saturation 9%, ferritin 6  TSH 0 7  7/13/2017 hemoglobin 10 7, MCV of 84, platelet count 483, white blood cell count 5, RDW 16 4, vitamin B12 low normal at 255  March 2013 hemoglobin 13 6, MCV 95, RDW 13 3, platelet count 347, white blood cell count 4 1  She states that post gastric bypass she had issues with addiction transferance with alcohol but has been sober for 4 years  At about this time, she started putting weight back on  She states "I don't eat well " She is working with a nutritionist  Tentatively, her revision surgery is for January 10th, 2018    her menses are irregular secondary to PCOS  She states her last was in October lasted 2-3 days  She does experience some early satiety, nausea related to an ulcer  Review of Systems    Constitutional: feeling poorly, recent weight gain and feeling tired, but no fever, no chills and no recent weight loss  Eyes: No complaints of eye pain, no red eyes, no eyesight problems, no discharge, no dry eyes, no itching of eyes     ENT: no complaints of earache, no loss of hearing, no nose bleeds, no nasal discharge, no sore throat, no hoarseness  Cardiovascular: No complaints of slow heart rate, no fast heart rate, no chest pain, no palpitations, no leg claudication, no lower extremity edema  Respiratory: wheezing and shortness of breath during exertion, but no cough and no orthopnea  Gastrointestinal: as noted in HPI  Genitourinary: as noted in HPI  Musculoskeletal: No complaints of arthralgias, no myalgias, no joint swelling or stiffness, no limb pain or swelling  Integumentary: No complaints of skin rash or lesions, no itching, no skin wounds, no breast pain or lump  Neurological: No complaints of headache, no confusion, no convulsions, no numbness, no dizziness or fainting, no tingling, no limb weakness, no difficulty walking  Psychiatric: anxiety and depression, but not suicidal, no personality change, no sleep disturbances and no emotional problems  Endocrine: No complaints of proptosis, no hot flashes, no muscle weakness, no deepening of the voice, no feelings of weakness  Hematologic/Lymphatic: No complaints of swollen glands, no swollen glands in the neck, does not bleed easily, does not bruise easily  Active Problems   1  Anxiety (300 00) (F41 9)  2  Bipolar disorder (296 80) (F31 9)  3  Depression with anxiety (300 4) (F41 8)  4  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 3) (K91 2,Z90 3)  5  Morbid obesity (278 01) (E66 01)  6  Nausea (787 02) (R11 0)  7  Obstructive sleep apnea (327 23) (G47 33)  8  OUMOU on CPAP (327 23,V46 8) (G47 33,Z99 89)  9  Polycystic ovarian syndrome (256 4) (E28 2)  10  Preoperative clearance (V72 84) (Z01 818)  11  Recurrent major depressive disorder, remission status unspecified (296 30) (F33 9)  12  S/P bariatric surgery (V45 86) (Z98 84)  13  Screening for lipoid disorders (V77 91) (Z13 220)  14   Shortness of breath (786 05) (R06 02)    Surgical History    · History of Cholecystectomy   · History of Gastric Surgery For Morbid Obesity   · History of Panniculectomy    Family History    · Family history of bipolar disorder (V17 0) (Z81 8)   · Family history of diabetes mellitus (V18 0) (Z83 3)    · Family history of hypertension (V17 49) (Z82 49)    · Family history of bipolar disorder (V17 0) (Z81 8)   · Family history of diabetes mellitus (V18 0) (Z83 3)   · Family history of malignant neoplasm of breast (V16 3) (Z80 3)   · Family history of malignant neoplasm of ovary (V16 41) (Z80 41)    · Family history of bipolar disorder (V17 0) (Z81 8)   · Family history of diabetes mellitus (V18 0) (Z83 3)   · Family history of malignant neoplasm of ovary (V16 41) (Z80 41)    The family history was reviewed and updated today  Social History    · Alcohol Use (History)   · Alcoholism in recovery (303 90) (F10 20)   · Former smoker (V64 96) (Z63 501)   · Denied: History of drug use   · Never a smoker    Current Meds  1  BuPROPion HCl ER (SR) 150 MG Oral Tablet Extended Release 12 Hour; Therapy: 89BBR4911 to Recorded  2  Calcium Citrate CAPS; Therapy: (Recorded:50Odk8061) to Recorded  3  Carafate 1 GM/10ML Oral Suspension; Therapy: (Recorded:04Oct2017) to Recorded  4  CarBAMazepine 200 MG Oral Tablet; TAKE 1 TABLET TWICE DAILY; Therapy: 69NVU4318 to Recorded  5  Iron Supplement TABS; Therapy: (Recorded:66Dsq1296) to Recorded  6  Methylphenidate HCl - 5 MG Oral Tablet; TAKE 1 TABLET 3 TIMES DAILY @ 800, 1200,   1600; Therapy: 22NMV0048 to (Evaluate:95Uar2925); Last Rx:15Jun2017 Ordered  7  Multi-Day Oral Tablet; Therapy: (Recorded:97Lko0515) to Recorded  8  Omeprazole 20 MG Oral Capsule Delayed Release; TAKE 1 CAPSULE BY MOUTH   EVERY DAY; Therapy: 78WGN5411 to (Evaluate:17Sqd3189)  Requested for: 35CFJ9946; Last   Rx:09Nov2017 Ordered  9  QUEtiapine Fumarate 50 MG Oral Tablet; Therapy: 54NTC7222 to Recorded  10  Sucralfate 1 GM Oral Tablet; TAKE 1 TABLET 4 TIMES DAILY;     Therapy: 70NZX9442 to (Evaluate:07Jan2018)  Requested for: 00QUK4595; Last    Rx:96Ewh9192; Status: ACTIVE - Retrospective By Protocol Authorization Ordered  11  Venlafaxine HCl  MG Oral Capsule Extended Release 24 Hour; Take 1 capsule    twice daily; Therapy: 71MEN6105 to (Evaluate:00Wsb8492); Last Rx:15Jun2017 Ordered  12  Vitamin D TABS; Therapy: (Recorded:48Dnu4594) to Recorded    The medication list was reviewed and updated today  Allergies   1  Percocet TABS    Vitals   Recorded: 98JQK0651 08:22AM   Temperature 98 F   Heart Rate 101   Respiration 18   Systolic 210   Diastolic 82   Height 5 ft 5 in   Weight 240 lb 8 0 oz   BMI Calculated 40 02   BSA Calculated 2 14   O2 Saturation 98   Pain Scale 0     Physical Exam    Constitutional   General appearance: No acute distress, well appearing and well nourished  Eyes   Conjunctiva and lids: No swelling, erythema or discharge  Pupils and irises: Equal, round and reactive to light  Ears, Nose, Mouth, and Throat   External inspection of ears and nose: Normal     Oropharynx: Normal with no erythema, edema, exudate or lesions  Pulmonary   Respiratory effort: No increased work of breathing or signs of respiratory distress  Auscultation of lungs: Clear to auscultation  Cardiovascular   Palpation of heart: Normal PMI, no thrills  Auscultation of heart: Normal rate and rhythm, normal S1 and S2, without murmurs  Examination of extremities for edema and/or varicosities: Normal     Abdomen   Abdomen: Non-tender, no masses  Liver and spleen: No hepatomegaly or splenomegaly  Lymphatic   Palpation of lymph nodes in neck: No lymphadenopathy  Musculoskeletal   Gait and station: Normal     Digits and nails: Normal without clubbing or cyanosis  Skin   Skin and subcutaneous tissue: Normal without rashes or lesions  Psychiatric   Orientation to person, place, and time: Normal          Assessment   1  Anemia, iron deficiency (280 9) (D50 9)  2  S/P bariatric surgery (V45 86) (Z98 84)  3   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 3) (K91 2,Z90 3)  4  B12 deficiency (266 2) (E53 8)  5  History of Panniculectomy    Plan  B12 deficiency    · (1) CBC/PLT/DIFF; Status:Active; Requested for:Dates Schedule: 2018 ; Perform:[facility]; [id number];ASQVZDY; For:B12 deficiency; Ordered   By:Bebeto Pate;   · (1) COMPREHENSIVE METABOLIC PANEL; Status:Active; Requested for:Dates  Schedule: 2018 ; Perform:[facility]; LWN:99KWH9444;CLPIDCB; For:B12 deficiency; Ordered   By:Bebeto Pate;   · (1) VITAMIN B12; Status:Active; Requested for:Dates Schedule: 2018 ; Perform:[facility]; HJC:13SCD8596;FFNWNRG; For:B12 deficiency; Ordered   By:Bebeto Pate;   · Follow-up visit in 6 months Evaluation and Treatment  Follow-up  Status: Complete   Done: 47VBS1359  Ordered; For: B12 deficiency; Ordered By: Gladis Perez  Performed:   Due:   97FDJ1391; Last Updated By: Aissatou Aldridge; 12/15/2017 9:15:08 AM  B12 deficiency, Intestinal malabsorption following gastrectomy    · (1) FERRITIN; Status:Active; Requested for:Dates Schedule: 2018 ; Perform:[facility]; ECQ:11VAT0307;VBJWRJL; For:B12 deficiency, Intestinal   malabsorption following gastrectomy; Ordered By:Cornelia Pate;   · (1) IRON; Status:Active; Requested for:Dates Schedule: 2018 ; Perform:[facility]; OEP:74HVV6399;SBXFZUU; For:B12 deficiency, Intestinal   malabsorption following gastrectomy; Ordered By:Cornelia Pate;   · (1) TIBC; Status:Active; Requested for:Dates Schedule: 2018 ; Perform:[facility]; QJH:20XDZ0620;YINLLQR; For:B12 deficiency, Intestinal   malabsorption following gastrectomy; Ordered By:Bebeto Pate; Discussion/Summary    #1 Iron deficiency anemia with ferritin of 6 iron saturation 9% 2017 in a patient who has undergone gastric bypass surgery    Potential side effects of IV iron could include but may not be limited to: change in taste, diarrhea, muscle cramps, nausea or vomiting, pain in the arms or legs, pain or burning sensation in the injection site, allergic reaction  The patient verbalized understanding and wishes to proceed  Venofer 200mg IV 1-2 week x 8 doses recommended  #2 Vitamin B12 deficiency in a patient who has undergone gastric bypass surgery  B12 1000mcg IM weekly with IV iron replacement recommended  She is asked to follow up in 6 months with repeat labwork prior  Any issues or concerns in the interim, she is asked to call the office        Signatures   Electronically signed by : Lynnette Morley, [facility]; Dec 15 2017  9:50AM EST                       (Author)    Electronically signed by : JOSÉ MANUEL Tierney ; Dec 15 2017 12:36PM EST

## 2018-01-23 NOTE — PROGRESS NOTES
Message  placed call to patient's psychiatrist to inform them that fax was sent to their office with a release of information requesting DSM V mental health diagnosis and substance abuse recovery diagnosis  Active Problems    1  Anxiety (300 00) (F41 9)   2  Bipolar disorder (296 80) (F31 9)   3  Depression with anxiety (300 4) (F41 8)   4  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 3) (K91 2,Z90 3)   5  Morbid obesity (278 01) (E66 01)   6  Nausea (787 02) (R11 0)   7  Obstructive sleep apnea (327 23) (G47 33)   8  OUMOU on CPAP (327 23,V46 8) (G47 33,Z99 89)   9  Polycystic ovarian syndrome (256 4) (E28 2)   10  Preoperative clearance (V72 84) (Z01 818)   11  Recurrent major depressive disorder, remission status unspecified (296 30) (F33 9)   12  S/P bariatric surgery (V45 86) (Z98 84)   13  Screening for lipoid disorders (V77 91) (Z13 220)   14  Shortness of breath (786 05) (R06 02)    Current Meds   1  BuPROPion HCl ER (SR) 150 MG Oral Tablet Extended Release 12 Hour; Therapy: 90ETG5462 to Recorded   2  Calcium Citrate CAPS; Therapy: (Recorded:10Tdx7640) to Recorded   3  Carafate 1 GM/10ML Oral Suspension; Therapy: (Recorded:04Oct2017) to Recorded   4  CarBAMazepine 200 MG Oral Tablet; TAKE 1 TABLET TWICE DAILY; Therapy: 54YRI5676 to Recorded   5  Iron Supplement TABS; Therapy: (Recorded:45Hbl6125) to Recorded   6  Methylphenidate HCl - 5 MG Oral Tablet (Ritalin); TAKE 1 TABLET 3 TIMES DAILY @ 800,   1200, 1600; Therapy: 28UDP8495 to (Evaluate:43Kwk9154); Last Rx:15Jun2017 Ordered   7  Multi-Day Oral Tablet; Therapy: (Recorded:15Sml4998) to Recorded   8  Omeprazole 20 MG Oral Capsule Delayed Release; TAKE 1 CAPSULE BY MOUTH   EVERY DAY; Therapy: 15HUI2553 to (Evaluate:14Btt4506)  Requested for: 14TSJ7340; Last   Rx:09Nov2017 Ordered   9  QUEtiapine Fumarate 50 MG Oral Tablet; Therapy: 08QUZ1479 to Recorded   10  Sucralfate 1 GM Oral Tablet; TAKE 1 TABLET 4 TIMES A DAY;     Therapy: 66EXU9906 to (Last Rx:05Oct2017)  Requested for: 90COR4874 Ordered   11  Venlafaxine HCl  MG Oral Capsule Extended Release 24 Hour (Effexor XR); Take    1 capsule twice daily; Therapy: 27MWX0044 to (Evaluate:86Gzr3527); Last Rx:15Jun2017 Ordered   12  Vitamin D TABS; Therapy: (Recorded:89Rrc2475) to Recorded    Allergies    1  Percocet TABS    Signatures   Electronically signed by :  Doc Guo, FABIMSEDUAR,FABI; Dec  1 2017  2:58PM EST                       (Author)

## 2018-01-23 NOTE — PROGRESS NOTES
Message  received call from patient's psychiatrist office  spoke with Edita Olsen  she will have all necessary paperwork ready for psychiatrist to complete  explained that all that is needed is one statement on the diagnostic description component of current letter that states either full or partial remission as is identified by psychiatrist       Active Problems    1  Anemia, iron deficiency (280 9) (D50 9)   2  Anxiety (300 00) (F41 9)   3  B12 deficiency (266 2) (E53 8)   4  Bipolar disorder (296 80) (F31 9)   5  Depression with anxiety (300 4) (F41 8)   6  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 3) (K91 2,Z90 3)   7  Morbid obesity (278 01) (E66 01)   8  Nausea (787 02) (R11 0)   9  Obstructive sleep apnea (327 23) (G47 33)   10  OUMOU on CPAP (327 23,V46 8) (G47 33,Z99 89)   11  Polycystic ovarian syndrome (256 4) (E28 2)   12  Preoperative clearance (V72 84) (Z01 818)   13  Recurrent major depressive disorder, remission status unspecified (296 30) (F33 9)   14  S/P bariatric surgery (V45 86) (Z98 84)   15  Screening for lipoid disorders (V77 91) (Z13 220)   16  Shortness of breath (786 05) (R06 02)    Current Meds   1  BuPROPion HCl ER (SR) 150 MG Oral Tablet Extended Release 12 Hour; Therapy: 22WCU8395 to Recorded   2  Calcium Citrate CAPS; Therapy: (Recorded:28Adu5222) to Recorded   3  Carafate 1 GM/10ML Oral Suspension; Therapy: (Recorded:04Oct2017) to Recorded   4  CarBAMazepine 200 MG Oral Tablet; TAKE 1 TABLET TWICE DAILY; Therapy: 96NYB8311 to Recorded   5  Iron Supplement TABS; Therapy: (Recorded:08Iit8502) to Recorded   6  Methylphenidate HCl - 5 MG Oral Tablet (Ritalin); TAKE 1 TABLET 3 TIMES DAILY @ 800,   1200, 1600; Therapy: 13ITS3605 to (Evaluate:72Liv6062); Last Rx:15Jun2017 Ordered   7  Multi-Day Oral Tablet; Therapy: (Recorded:19Tcx9480) to Recorded   8  Omeprazole 20 MG Oral Capsule Delayed Release; TAKE 1 CAPSULE BY MOUTH   EVERY DAY;    Therapy: 13ZOZ6570 to (Evaluate:17Jan2018)  Requested for: 85PHS9818; Last   Rx:00Ufp7166; Status: ACTIVE - Retrospective By Protocol Authorization Ordered   9  QUEtiapine Fumarate 50 MG Oral Tablet; Therapy: 92SEM0113 to Recorded   10  Sucralfate 1 GM Oral Tablet; TAKE 1 TABLET 4 TIMES DAILY; Therapy: 27ZGR6106 to (Evaluate:07Jan2018)  Requested for: 62JJB8655; Last    Rx:22Vzj3356; Status: ACTIVE - Retrospective By Protocol Authorization Ordered   11  Venlafaxine HCl  MG Oral Capsule Extended Release 24 Hour (Effexor XR); Take    1 capsule twice daily; Therapy: 83CSJ1259 to (Evaluate:12Dec2017); Last Rx:15Jun2017 Ordered   12  Vitamin D TABS; Therapy: (Recorded:25Brk2015) to Recorded    Allergies    1  Percocet TABS    Signatures   Electronically signed by :  Jennette Spurling, LCSWMSWMSHCRIS,FABI; Dec 29 2017 10:57AM EST                       (Author)

## 2018-01-23 NOTE — PROGRESS NOTES
Message  placed follow up call to patient to discuss need for signed release  Patient will stop by office to sign release of information form today  Active Problems    1  Anxiety (300 00) (F41 9)   2  Bipolar disorder (296 80) (F31 9)   3  Depression with anxiety (300 4) (F41 8)   4  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 3) (K91 2,Z90 3)   5  Morbid obesity (278 01) (E66 01)   6  Nausea (787 02) (R11 0)   7  Obstructive sleep apnea (327 23) (G47 33)   8  OUMOU on CPAP (327 23,V46 8) (G47 33,Z99 89)   9  Polycystic ovarian syndrome (256 4) (E28 2)   10  Preoperative clearance (V72 84) (Z01 818)   11  Recurrent major depressive disorder, remission status unspecified (296 30) (F33 9)   12  S/P bariatric surgery (V45 86) (Z98 84)   13  Screening for lipoid disorders (V77 91) (Z13 220)   14  Shortness of breath (786 05) (R06 02)    Current Meds   1  BuPROPion HCl ER (SR) 150 MG Oral Tablet Extended Release 12 Hour; Therapy: 40PTS7823 to Recorded   2  Calcium Citrate CAPS; Therapy: (Recorded:01Crc4569) to Recorded   3  Carafate 1 GM/10ML Oral Suspension; Therapy: (Recorded:04Oct2017) to Recorded   4  CarBAMazepine 200 MG Oral Tablet; TAKE 1 TABLET TWICE DAILY; Therapy: 82HEN3622 to Recorded   5  Iron Supplement TABS; Therapy: (Recorded:02Ifg5190) to Recorded   6  Methylphenidate HCl - 5 MG Oral Tablet (Ritalin); TAKE 1 TABLET 3 TIMES DAILY @ 800,   1200, 1600; Therapy: 91FXJ1180 to (Evaluate:19Uij4407); Last Rx:15Jun2017 Ordered   7  Multi-Day Oral Tablet; Therapy: (Recorded:10Irj1276) to Recorded   8  Omeprazole 20 MG Oral Capsule Delayed Release; TAKE 1 CAPSULE BY MOUTH   EVERY DAY; Therapy: 65CPV5484 to (Evaluate:43Fyl1459)  Requested for: 80BQZ7508; Last   Rx:09Nov2017 Ordered   9  QUEtiapine Fumarate 50 MG Oral Tablet; Therapy: 25YXN4847 to Recorded   10  Sucralfate 1 GM Oral Tablet; TAKE 1 TABLET 4 TIMES A DAY;     Therapy: 60RDG3352 to (Last Rx:05Oct2017)  Requested for: 79NFZ6349 Ordered   11  Venlafaxine HCl  MG Oral Capsule Extended Release 24 Hour (Effexor XR); Take    1 capsule twice daily; Therapy: 21TKM0222 to (Evaluate:99Rga9943); Last Rx:15Jun2017 Ordered   12  Vitamin D TABS; Therapy: (Recorded:96Vna7639) to Recorded    Allergies    1  Percocet TABS    Signatures   Electronically signed by :  Nellie Morton LCSWMSWMSCHRIS,FABI; Dec  1 2017 11:54AM EST                       (Author)

## 2018-01-23 NOTE — PROGRESS NOTES
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) (K91 2,Z90 3)   5  Morbid obesity (278 01) (E66 01)   6  Nausea (787 02) (R11 0)   7  Obstructive sleep apnea (327 23) (G47 33)   8  OUMOU on CPAP (327 23,V46 8) (G47 33,Z99 89)   9  Polycystic ovarian syndrome (256 4) (E28 2)   10  Preoperative clearance (V72 84) (Z01 818)   11  Recurrent major depressive disorder, remission status unspecified (296 30) (F33 9)   12  S/P bariatric surgery (V45 86) (Z98 84)   13  Screening for lipoid disorders (V77 91) (Z13 220)   14  Shortness of breath (786 05) (R06 02)    Current Meds   1  BuPROPion HCl ER (SR) 150 MG Oral Tablet Extended Release 12 Hour; Therapy: 84WVN6861 to Recorded   2  Calcium Citrate CAPS; Therapy: (Recorded:41Dwz9967) to Recorded   3  Carafate 1 GM/10ML Oral Suspension; Therapy: (Recorded:04Oct2017) to Recorded   4  CarBAMazepine 200 MG Oral Tablet; TAKE 1 TABLET TWICE DAILY; Therapy: 58ZTU8592 to Recorded   5  Iron Supplement TABS; Therapy: (Recorded:82Yma0133) to Recorded   6  Methylphenidate HCl - 5 MG Oral Tablet (Ritalin); TAKE 1 TABLET 3 TIMES DAILY @ 800,   1200, 1600; Therapy: 15KXK2789 to (Evaluate:66Tww4043); Last Rx:15Jun2017 Ordered   7  Multi-Day Oral Tablet; Therapy: (Recorded:36Nhc8921) to Recorded   8  Omeprazole 20 MG Oral Capsule Delayed Release; TAKE 1 CAPSULE BY MOUTH   EVERY DAY; Therapy: 86OCZ2620 to (Evaluate:38Rfd9871)  Requested for: 59BNF4526; Last   Rx:09Nov2017 Ordered   9  QUEtiapine Fumarate 50 MG Oral Tablet; Therapy: 83TYF6452 to Recorded   10  Sucralfate 1 GM Oral Tablet; TAKE 1 TABLET 4 TIMES A DAY; Therapy: 46KDE6662 to (Last Rx:05Oct2017)  Requested for: 87ATJ1729 Ordered   11   Venlafaxine HCl  MG Oral Capsule Extended Release 24 Hour (Effexor XR); Take    1 capsule twice daily; Therapy: 26THP4248 to (Evaluate:56Lve5799); Last Rx:15Jun2017 Ordered   12  Vitamin D TABS; Therapy: (Recorded:35Ilj6677) to Recorded    Allergies    1  Percocet TABS    Signatures   Electronically signed by :  Nasrin Luther LCSWMSWMSCHRIS,FABI; Dec  7 2017 11:40AM EST                       (Author)

## 2018-01-23 NOTE — PROGRESS NOTES
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) (K91 2,Z90 3)   7  Morbid obesity (278 01) (E66 01)   8  Nausea (787 02) (R11 0)   9  Obstructive sleep apnea (327 23) (G47 33)   10  OUMOU on CPAP (327 23,V46 8) (G47 33,Z99 89)   11  Polycystic ovarian syndrome (256 4) (E28 2)   12  Preoperative clearance (V72 84) (Z01 818)   13  Recurrent major depressive disorder, remission status unspecified (296 30) (F33 9)   14  S/P bariatric surgery (V45 86) (Z98 84)   15  Screening for lipoid disorders (V77 91) (Z13 220)   16  Shortness of breath (786 05) (R06 02)    Current Meds   1  BuPROPion HCl ER (SR) 150 MG Oral Tablet Extended Release 12 Hour; Therapy: 85XQG0350 to Recorded   2  Calcium Citrate CAPS; Therapy: (Recorded:75Wps9350) to Recorded   3  Carafate 1 GM/10ML Oral Suspension; Therapy: (Recorded:04Oct2017) to Recorded   4  CarBAMazepine 200 MG Oral Tablet; TAKE 1 TABLET TWICE DAILY; Therapy: 03FVD5538 to Recorded   5  Iron Supplement TABS; Therapy: (Recorded:48Lmi3648) to Recorded   6  Methylphenidate HCl - 5 MG Oral Tablet (Ritalin); TAKE 1 TABLET 3 TIMES DAILY @ 800,   1200, 1600; Therapy: 10YRR7421 to (Evaluate:09Cfu0789); Last Rx:15Jun2017 Ordered   7  Multi-Day Oral Tablet; Therapy: (Recorded:15Ptd5187) to Recorded   8  Omeprazole 20 MG Oral Capsule Delayed Release; TAKE 1 CAPSULE BY MOUTH   EVERY DAY; Therapy: 63JJZ7501 to (Evaluate:17Jan2018)  Requested for: 58MOR9112; Last   Rx:43Icm5948; Status: ACTIVE - Retrospective By Protocol Authorization Ordered   9  QUEtiapine Fumarate 50 MG Oral Tablet; Therapy: 99DHC6754 to Recorded   10   Sucralfate 1 GM Oral Tablet; TAKE 1 TABLET 4 TIMES DAILY; Therapy: 09RMJ0143 to (Evaluate:07Jan2018)  Requested for: 94FCB1303; Last    Rx:08Dec2017; Status: ACTIVE - Retrospective By Protocol Authorization Ordered   11  Venlafaxine HCl  MG Oral Capsule Extended Release 24 Hour (Effexor XR); Take    1 capsule twice daily; Therapy: 98XKW7279 to (Evaluate:12Dec2017); Last Rx:15Jun2017 Ordered   12  Vitamin D TABS; Therapy: (Recorded:97Rsz6349) to Recorded    Allergies    1  Percocet TABS    Signatures   Electronically signed by :  Sinai Hammer, SHARRIWMSWMSCHRIS,FABI; Vinod  3 2018  3:29PM EST                       (Author)

## 2018-01-23 NOTE — PROGRESS NOTES
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) (K91 2,Z90 3)   7  Morbid obesity (278 01) (E66 01)   8  Nausea (787 02) (R11 0)   9  Obstructive sleep apnea (327 23) (G47 33)   10  OUMOU on CPAP (327 23,V46 8) (G47 33,Z99 89)   11  Polycystic ovarian syndrome (256 4) (E28 2)   12  Preoperative clearance (V72 84) (Z01 818)   13  Recurrent major depressive disorder, remission status unspecified (296 30) (F33 9)   14  S/P bariatric surgery (V45 86) (Z98 84)   15  Screening for lipoid disorders (V77 91) (Z13 220)   16  Shortness of breath (786 05) (R06 02)    Current Meds   1  BuPROPion HCl ER (SR) 150 MG Oral Tablet Extended Release 12 Hour; Therapy: 24UBB0895 to Recorded   2  Calcium Citrate CAPS; Therapy: (Recorded:79Hhn2460) to Recorded   3  Carafate 1 GM/10ML Oral Suspension; Therapy: (Recorded:04Oct2017) to Recorded   4  CarBAMazepine 200 MG Oral Tablet; TAKE 1 TABLET TWICE DAILY; Therapy: 98ZNL8261 to Recorded   5  Iron Supplement TABS; Therapy: (Recorded:67Upm9096) to Recorded   6  Methylphenidate HCl - 5 MG Oral Tablet (Ritalin); TAKE 1 TABLET 3 TIMES DAILY @ 800,   1200, 1600; Therapy: 58IYA6960 to (Evaluate:99Mxi0118); Last Rx:15Jun2017 Ordered   7  Multi-Day Oral Tablet; Therapy: (Recorded:68Sbv0311) to Recorded   8  Omeprazole 20 MG Oral Capsule Delayed Release; TAKE 1 CAPSULE BY MOUTH   EVERY DAY;    Therapy: 07WIK2775 to (Evaluate:17Jan2018)  Requested for: 31RYL6053; Last   Rx:41Euv9663; Status: ACTIVE - Retrospective By Protocol Authorization Ordered   9  QUEtiapine Fumarate 50 MG Oral Tablet; Therapy: 87LQQ3339 to Recorded   10  Sucralfate 1 GM Oral Tablet; TAKE 1 TABLET 4 TIMES DAILY; Therapy: 56JII9102 to (Evaluate:07Jan2018)  Requested for: 26FAH1503; Last    Rx:27Lvm4389; Status: ACTIVE - Retrospective By Protocol Authorization Ordered   11  Venlafaxine HCl  MG Oral Capsule Extended Release 24 Hour (Effexor XR); Take    1 capsule twice daily; Therapy: 74UKS2259 to (Evaluate:12Dec2017); Last Rx:15Jun2017 Ordered   12  Vitamin D TABS; Therapy: (Recorded:98Hbf6917) to Recorded    Allergies    1  Percocet TABS    Signatures   Electronically signed by :  Doc Guo, MIGUEL ÁNGEL,FABI; Vinod  3 2018  2:55PM EST                       (Author)

## 2018-01-23 NOTE — PROGRESS NOTES
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) (K91 2,Z90 3)   5  Morbid obesity (278 01) (E66 01)   6  Nausea (787 02) (R11 0)   7  Obstructive sleep apnea (327 23) (G47 33)   8  OUMOU on CPAP (327 23,V46 8) (G47 33,Z99 89)   9  Polycystic ovarian syndrome (256 4) (E28 2)   10  Preoperative clearance (V72 84) (Z01 818)   11  Recurrent major depressive disorder, remission status unspecified (296 30) (F33 9)   12  S/P bariatric surgery (V45 86) (Z98 84)   13  Screening for lipoid disorders (V77 91) (Z13 220)   14  Shortness of breath (786 05) (R06 02)    Current Meds   1  BuPROPion HCl ER (SR) 150 MG Oral Tablet Extended Release 12 Hour; Therapy: 91ECA4995 to Recorded   2  Calcium Citrate CAPS; Therapy: (Recorded:45Nxv7113) to Recorded   3  Carafate 1 GM/10ML Oral Suspension; Therapy: (Recorded:04Oct2017) to Recorded   4  CarBAMazepine 200 MG Oral Tablet; TAKE 1 TABLET TWICE DAILY; Therapy: 56JUR3708 to Recorded   5  Iron Supplement TABS; Therapy: (Recorded:11Ryo1115) to Recorded   6  Methylphenidate HCl - 5 MG Oral Tablet (Ritalin); TAKE 1 TABLET 3 TIMES DAILY @ 800,   1200, 1600; Therapy: 72UFG2608 to (Evaluate:92Nid2691); Last Rx:15Jun2017 Ordered   7  Multi-Day Oral Tablet; Therapy: (Recorded:10Wgq0568) to Recorded   8  Omeprazole 20 MG Oral Capsule Delayed Release; TAKE 1 CAPSULE BY MOUTH   EVERY DAY; Therapy: 20KMQ1303 to (Evaluate:65Tkc6899)  Requested for: 96IGT4525; Last   Rx:09Nov2017 Ordered   9  QUEtiapine Fumarate 50 MG Oral Tablet; Therapy: 92TVT2527 to Recorded   10  Sucralfate 1 GM Oral Tablet; TAKE 1 TABLET 4 TIMES A DAY; Therapy: 49YMM9212 to (Last Rx:05Oct2017)  Requested for: 50WEF1872 Ordered   11   Venlafaxine HCl  MG Oral Capsule Extended Release 24 Hour (Effexor XR); Take    1 capsule twice daily; Therapy: 36NEW6072 to (Evaluate:83Euf0340); Last Rx:15Jun2017 Ordered   12  Vitamin D TABS; Therapy: (Recorded:84Gog7304) to Recorded    Allergies    1  Percocet TABS    Signatures   Electronically signed by :  Kierra Self LCSWMSFABI WITT; Dec  7 2017 11:35AM EST                       (Author)

## 2018-01-23 NOTE — PROGRESS NOTES
Message  received and reviewed paperwork from patient's psychiatrist office  Psychiatrist did not identify that patient is in full or partial remission  A call was placed to patient to discuss possible resolutions  Patient can either work with psychiatrist or go for a full drug and alcohol evaluation  Patient has agreed to contact her psychiatrist today and to address this concern  Patient will call tomorrow with follow up information  Active Problems    1  Anemia, iron deficiency (280 9) (D50 9)   2  Anxiety (300 00) (F41 9)   3  B12 deficiency (266 2) (E53 8)   4  Bipolar disorder (296 80) (F31 9)   5  Depression with anxiety (300 4) (F41 8)   6  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 3) (K91 2,Z90 3)   7  Morbid obesity (278 01) (E66 01)   8  Nausea (787 02) (R11 0)   9  Obstructive sleep apnea (327 23) (G47 33)   10  OUMOU on CPAP (327 23,V46 8) (G47 33,Z99 89)   11  Polycystic ovarian syndrome (256 4) (E28 2)   12  Preoperative clearance (V72 84) (Z01 818)   13  Recurrent major depressive disorder, remission status unspecified (296 30) (F33 9)   14  S/P bariatric surgery (V45 86) (Z98 84)   15  Screening for lipoid disorders (V77 91) (Z13 220)   16  Shortness of breath (786 05) (R06 02)    Current Meds   1  BuPROPion HCl ER (SR) 150 MG Oral Tablet Extended Release 12 Hour; Therapy: 66XRN4675 to Recorded   2  Calcium Citrate CAPS; Therapy: (Recorded:79Ryb9001) to Recorded   3  Carafate 1 GM/10ML Oral Suspension; Therapy: (Recorded:04Oct2017) to Recorded   4  CarBAMazepine 200 MG Oral Tablet; TAKE 1 TABLET TWICE DAILY; Therapy: 24SKF9058 to Recorded   5  Iron Supplement TABS; Therapy: (Recorded:57Fun5178) to Recorded   6  Methylphenidate HCl - 5 MG Oral Tablet (Ritalin); TAKE 1 TABLET 3 TIMES DAILY @ 800,   1200, 1600; Therapy: 95XQH4512 to (Evaluate:63Jmz7320); Last Rx:15Jun2017 Ordered   7  Multi-Day Oral Tablet; Therapy: (Recorded:08Bhq6766) to Recorded   8   Omeprazole 20 MG Oral Capsule Delayed Release; TAKE 1 CAPSULE BY MOUTH   EVERY DAY; Therapy: 28LKW8743 to (Evaluate:17Jan2018)  Requested for: 15TXZ2310; Last   Rx:12Szg0704; Status: ACTIVE - Retrospective By Protocol Authorization Ordered   9  QUEtiapine Fumarate 50 MG Oral Tablet; Therapy: 66UXD0906 to Recorded   10  Sucralfate 1 GM Oral Tablet; TAKE 1 TABLET 4 TIMES DAILY; Therapy: 75SZM7196 to (Evaluate:07Jan2018)  Requested for: 52GUZ5047; Last    Rx:74Pou7456; Status: ACTIVE - Retrospective By Protocol Authorization Ordered   11  Venlafaxine HCl  MG Oral Capsule Extended Release 24 Hour (Effexor XR); Take    1 capsule twice daily; Therapy: 74ZWT5173 to (Evaluate:12Dec2017); Last Rx:15Jun2017 Ordered   12  Vitamin D TABS; Therapy: (Recorded:53Svn8147) to Recorded    Allergies    1  Percocet TABS    Signatures   Electronically signed by :  Arnaldo Salmon LCSWMSWMSW,FABI; Dec 27 2017  1:51PM EST                       (Author)

## 2018-01-23 NOTE — PROGRESS NOTES
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) (K91 2,Z90 3)   7  Morbid obesity (278 01) (E66 01)   8  Nausea (787 02) (R11 0)   9  Obstructive sleep apnea (327 23) (G47 33)   10  OUMOU on CPAP (327 23,V46 8) (G47 33,Z99 89)   11  Polycystic ovarian syndrome (256 4) (E28 2)   12  Preoperative clearance (V72 84) (Z01 818)   13  Recurrent major depressive disorder, remission status unspecified (296 30) (F33 9)   14  S/P bariatric surgery (V45 86) (Z98 84)   15  Screening for lipoid disorders (V77 91) (Z13 220)   16  Shortness of breath (786 05) (R06 02)    Current Meds   1  BuPROPion HCl ER (SR) 150 MG Oral Tablet Extended Release 12 Hour; Therapy: 09QWA1393 to Recorded   2  Calcium Citrate CAPS; Therapy: (Recorded:51Vcp6553) to Recorded   3  Carafate 1 GM/10ML Oral Suspension; Therapy: (Recorded:04Oct2017) to Recorded   4  CarBAMazepine 200 MG Oral Tablet; TAKE 1 TABLET TWICE DAILY; Therapy: 86PKA7830 to Recorded   5  Iron Supplement TABS; Therapy: (Recorded:76Mfn9973) to Recorded   6  Methylphenidate HCl - 5 MG Oral Tablet (Ritalin); TAKE 1 TABLET 3 TIMES DAILY @ 800,   1200, 1600; Therapy: 84QHV7002 to (Evaluate:42Xrc5773); Last Rx:15Jun2017 Ordered   7  Multi-Day Oral Tablet; Therapy: (Recorded:21Pjn6267) to Recorded   8  Omeprazole 20 MG Oral Capsule Delayed Release; TAKE 1 CAPSULE BY MOUTH   EVERY DAY;    Therapy: 29YOU2196 to (Evaluate:17Jan2018)  Requested for: 30SPU3368; Last   Rx:59Vrx6150; Status: ACTIVE - Retrospective By Protocol Authorization Ordered   9  QUEtiapine Fumarate 50 MG Oral Tablet; Therapy: 60HIU0732 to Recorded   10  Sucralfate 1 GM Oral Tablet; TAKE 1 TABLET 4 TIMES DAILY; Therapy: 51MFM7689 to (Evaluate:07Jan2018)  Requested for: 43DHV0822; Last    Rx:08Dec2017; Status: ACTIVE - Retrospective By Protocol Authorization Ordered   11  Venlafaxine HCl  MG Oral Capsule Extended Release 24 Hour (Effexor XR); Take    1 capsule twice daily; Therapy: 78WCE6296 to (Evaluate:12Dec2017); Last Rx:15Jun2017 Ordered   12  Vitamin D TABS; Therapy: (Recorded:92Ozz7474) to Recorded    Allergies    1  Percocet TABS    Signatures   Electronically signed by :  Teodora Trevino LCSWMSWMSCHRIS,FABI; Dec 28 2017 10:13AM EST                       (Author)

## 2018-01-23 NOTE — PROGRESS NOTES
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 3) (K91 2,Z90 3)   7  Morbid obesity (278 01) (E66 01)   8  Nausea (787 02) (R11 0)   9  Obstructive sleep apnea (327 23) (G47 33)   10  OUMOU on CPAP (327 23,V46 8) (G47 33,Z99 89)   11  Polycystic ovarian syndrome (256 4) (E28 2)   12  Preoperative clearance (V72 84) (Z01 818)   13  Recurrent major depressive disorder, remission status unspecified (296 30) (F33 9)   14  S/P bariatric surgery (V45 86) (Z98 84)   15  Screening for lipoid disorders (V77 91) (Z13 220)   16  Shortness of breath (786 05) (R06 02)    Current Meds   1  BuPROPion HCl ER (SR) 150 MG Oral Tablet Extended Release 12 Hour; Therapy: 13WEQ2640 to Recorded   2  Calcium Citrate CAPS; Therapy: (Recorded:47Tva6369) to Recorded   3  Carafate 1 GM/10ML Oral Suspension; Therapy: (Recorded:04Oct2017) to Recorded   4  CarBAMazepine 200 MG Oral Tablet; TAKE 1 TABLET TWICE DAILY; Therapy: 82EQJ1550 to Recorded   5  Iron Supplement TABS; Therapy: (Recorded:73Lnc4819) to Recorded   6  Methylphenidate HCl - 5 MG Oral Tablet (Ritalin); TAKE 1 TABLET 3 TIMES DAILY @ 800,   1200, 1600; Therapy: 80NIB5608 to (Evaluate:48Fet9506); Last Rx:15Jun2017 Ordered   7  Multi-Day Oral Tablet; Therapy: (Recorded:38Jrj9030) to Recorded   8  Omeprazole 20 MG Oral Capsule Delayed Release; TAKE 1 CAPSULE BY MOUTH   EVERY DAY;    Therapy: 86ZLS3124 to (Evaluate:17Jan2018)  Requested for: 08WNF2916; Last   Rx:76Eyb6344; Status: ACTIVE - Retrospective By Protocol Authorization Ordered   9  QUEtiapine Fumarate 50 MG Oral Tablet; Therapy: 50TXP2054 to Recorded   10  Sucralfate 1 GM Oral Tablet; TAKE 1 TABLET 4 TIMES DAILY; Therapy: 46SBO6389 to (Evaluate:07Jan2018)  Requested for: 05HPX4549; Last    Rx:08Dec2017; Status: ACTIVE - Retrospective By Protocol Authorization Ordered   11  Venlafaxine HCl  MG Oral Capsule Extended Release 24 Hour (Effexor XR); Take    1 capsule twice daily; Therapy: 13CPX5311 to (Evaluate:12Dec2017); Last Rx:15Jun2017 Ordered   12  Vitamin D TABS; Therapy: (Recorded:83Kxy4013) to Recorded    Allergies    1  Percocet TABS    Signatures   Electronically signed by :  Nohemi Last LCSWMSWMSCHRIS,FABI; Dec 19 2017  2:26PM EST                       (Author)

## 2018-01-23 NOTE — PROGRESS NOTES
Message  received call from IRVING who states that patient contacted her regarding the need for substance abuse information before having revision surgery  MM requested that SW contact psychiatrist office as patient shared her concerns regarding the possible need for a drug and alcohol evaluation to resolve the issue  Provided MM information that shows that specific information was requested from psychiatrist office and that that information was not provided on most recent fax sent to this office on 12/21/2017  Explained that psychiatrist is not available this week and that I have spoken to Aditi Mendieta at their office on numerous occasions  Will place a call today to psychiatrist office  Active Problems    1  Anemia, iron deficiency (280 9) (D50 9)   2  Anxiety (300 00) (F41 9)   3  B12 deficiency (266 2) (E53 8)   4  Bipolar disorder (296 80) (F31 9)   5  Depression with anxiety (300 4) (F41 8)   6  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 3) (K91 2,Z90 3)   7  Morbid obesity (278 01) (E66 01)   8  Nausea (787 02) (R11 0)   9  Obstructive sleep apnea (327 23) (G47 33)   10  OUMOU on CPAP (327 23,V46 8) (G47 33,Z99 89)   11  Polycystic ovarian syndrome (256 4) (E28 2)   12  Preoperative clearance (V72 84) (Z01 818)   13  Recurrent major depressive disorder, remission status unspecified (296 30) (F33 9)   14  S/P bariatric surgery (V45 86) (Z98 84)   15  Screening for lipoid disorders (V77 91) (Z13 220)   16  Shortness of breath (786 05) (R06 02)    Current Meds   1  BuPROPion HCl ER (SR) 150 MG Oral Tablet Extended Release 12 Hour; Therapy: 13JGJ3479 to Recorded   2  Calcium Citrate CAPS; Therapy: (Recorded:52Rqj8708) to Recorded   3  Carafate 1 GM/10ML Oral Suspension; Therapy: (Recorded:04Oct2017) to Recorded   4  CarBAMazepine 200 MG Oral Tablet; TAKE 1 TABLET TWICE DAILY; Therapy: 98TLS2186 to Recorded   5  Iron Supplement TABS; Therapy: (Recorded:27Irb7080) to Recorded   6   Methylphenidate HCl - 5 MG Oral Tablet (Ritalin); TAKE 1 TABLET 3 TIMES DAILY @ 800,   1200, 1600; Therapy: 17QLK6827 to (Evaluate:76Gik4118); Last Rx:15Jun2017 Ordered   7  Multi-Day Oral Tablet; Therapy: (Recorded:26Bfv8594) to Recorded   8  Omeprazole 20 MG Oral Capsule Delayed Release; TAKE 1 CAPSULE BY MOUTH   EVERY DAY; Therapy: 01HZX6859 to (Evaluate:17Jan2018)  Requested for: 26UXK5862; Last   Rx:11Cro7674; Status: ACTIVE - Retrospective By Protocol Authorization Ordered   9  QUEtiapine Fumarate 50 MG Oral Tablet; Therapy: 20LXG5339 to Recorded   10  Sucralfate 1 GM Oral Tablet; TAKE 1 TABLET 4 TIMES DAILY; Therapy: 42RLL5523 to (Evaluate:07Jan2018)  Requested for: 69OQL0441; Last    Rx:25Vdt4441; Status: ACTIVE - Retrospective By Protocol Authorization Ordered   11  Venlafaxine HCl  MG Oral Capsule Extended Release 24 Hour (Effexor XR); Take    1 capsule twice daily; Therapy: 35QDB5307 to (Evaluate:33Kwb8326); Last Rx:15Jun2017 Ordered   12  Vitamin D TABS; Therapy: (Recorded:13Yiq7345) to Recorded    Allergies    1  Percocet TABS    Signatures   Electronically signed by :  MIGUEL ÁNGEL Topete,FABI; Dec 28 2017 10:06AM EST                       (Author)

## 2018-01-23 NOTE — PROGRESS NOTES
Message  placed call to patient's psychiatrist to discuss need for patient's Qasim Arango mental health diagnosis and substance abuse recovery diagnosis  Call taken by   she will have information faxed to this office and a call placed to inform us of fax  Active Problems    1  Anxiety (300 00) (F41 9)   2  Bipolar disorder (296 80) (F31 9)   3  Depression with anxiety (300 4) (F41 8)   4  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 3) (K91 2,Z90 3)   5  Morbid obesity (278 01) (E66 01)   6  Nausea (787 02) (R11 0)   7  Obstructive sleep apnea (327 23) (G47 33)   8  OUMOU on CPAP (327 23,V46 8) (G47 33,Z99 89)   9  Polycystic ovarian syndrome (256 4) (E28 2)   10  Preoperative clearance (V72 84) (Z01 818)   11  Recurrent major depressive disorder, remission status unspecified (296 30) (F33 9)   12  S/P bariatric surgery (V45 86) (Z98 84)   13  Screening for lipoid disorders (V77 91) (Z13 220)   14  Shortness of breath (786 05) (R06 02)    Current Meds   1  BuPROPion HCl ER (SR) 150 MG Oral Tablet Extended Release 12 Hour; Therapy: 97OLI6493 to Recorded   2  Calcium Citrate CAPS; Therapy: (Recorded:65Sib8947) to Recorded   3  Carafate 1 GM/10ML Oral Suspension; Therapy: (Recorded:04Oct2017) to Recorded   4  CarBAMazepine 200 MG Oral Tablet; TAKE 1 TABLET TWICE DAILY; Therapy: 54SDL6032 to Recorded   5  Iron Supplement TABS; Therapy: (Recorded:22Pzv8542) to Recorded   6  Methylphenidate HCl - 5 MG Oral Tablet (Ritalin); TAKE 1 TABLET 3 TIMES DAILY @ 800,   1200, 1600; Therapy: 12PGI0409 to (Evaluate:45Ssb3029); Last Rx:15Jun2017 Ordered   7  Multi-Day Oral Tablet; Therapy: (Recorded:71Ver5001) to Recorded   8  Omeprazole 20 MG Oral Capsule Delayed Release; TAKE 1 CAPSULE BY MOUTH   EVERY DAY; Therapy: 23ZBF6074 to (Evaluate:32Wmq3274)  Requested for: 97VTB3669; Last   Rx:09Nov2017 Ordered   9  QUEtiapine Fumarate 50 MG Oral Tablet; Therapy: 02CIA5065 to Recorded   10   Sucralfate 1 GM Oral Tablet; TAKE 1 TABLET 4 TIMES A DAY; Therapy: 04CIN5687 to (Last Rx:05Oct2017)  Requested for: 00TGJ4792 Ordered   11  Venlafaxine HCl  MG Oral Capsule Extended Release 24 Hour (Effexor XR); Take    1 capsule twice daily; Therapy: 52VSW3396 to (Evaluate:97Ayx0842); Last Rx:15Jun2017 Ordered   12  Vitamin D TABS; Therapy: (Recorded:61Vvh8153) to Recorded    Allergies    1  Percocet TABS    Signatures   Electronically signed by :  Nohemi Last, FABIMSWMSCHRIS,FABI; Dec  5 2017 12:33PM EST                       (Author)

## 2018-01-23 NOTE — PROGRESS NOTES
Message  spoke to Carry Vangie at psychiatrist's office and shared that there was no indication if alcohol use disorder is in remission  she requested we send letter with specific information needed for psychiatrist to complete      Active Problems    1  Anxiety (300 00) (F41 9)   2  Bipolar disorder (296 80) (F31 9)   3  Depression with anxiety (300 4) (F41 8)   4  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 3) (K91 2,Z90 3)   5  Morbid obesity (278 01) (E66 01)   6  Nausea (787 02) (R11 0)   7  Obstructive sleep apnea (327 23) (G47 33)   8  OUMOU on CPAP (327 23,V46 8) (G47 33,Z99 89)   9  Polycystic ovarian syndrome (256 4) (E28 2)   10  Preoperative clearance (V72 84) (Z01 818)   11  Recurrent major depressive disorder, remission status unspecified (296 30) (F33 9)   12  S/P bariatric surgery (V45 86) (Z98 84)   13  Screening for lipoid disorders (V77 91) (Z13 220)   14  Shortness of breath (786 05) (R06 02)    Current Meds   1  BuPROPion HCl ER (SR) 150 MG Oral Tablet Extended Release 12 Hour; Therapy: 13OBU0062 to Recorded   2  Calcium Citrate CAPS; Therapy: (Recorded:25Wae8862) to Recorded   3  Carafate 1 GM/10ML Oral Suspension; Therapy: (Recorded:04Oct2017) to Recorded   4  CarBAMazepine 200 MG Oral Tablet; TAKE 1 TABLET TWICE DAILY; Therapy: 57XXB3318 to Recorded   5  Iron Supplement TABS; Therapy: (Recorded:42Omn3807) to Recorded   6  Methylphenidate HCl - 5 MG Oral Tablet (Ritalin); TAKE 1 TABLET 3 TIMES DAILY @ 800,   1200, 1600; Therapy: 78VCA0994 to (Evaluate:00Wlm7860); Last Rx:15Jun2017 Ordered   7  Multi-Day Oral Tablet; Therapy: (Recorded:34Vfk4573) to Recorded   8  Omeprazole 20 MG Oral Capsule Delayed Release; TAKE 1 CAPSULE BY MOUTH   EVERY DAY; Therapy: 11AEE3768 to (Evaluate:33Yew0801)  Requested for: 82TZV1258; Last   Rx:09Nov2017 Ordered   9  QUEtiapine Fumarate 50 MG Oral Tablet; Therapy: 25MQL8379 to Recorded   10   Sucralfate 1 GM Oral Tablet; TAKE 1 TABLET 4 TIMES A DAY;    Therapy: 18KZZ5738 to (Last Rx:05Oct2017)  Requested for: 33YZA2979 Ordered   11  Venlafaxine HCl  MG Oral Capsule Extended Release 24 Hour (Effexor XR); Take    1 capsule twice daily; Therapy: 23LHD6312 to (Evaluate:21Aou9222); Last Rx:15Jun2017 Ordered   12  Vitamin D TABS; Therapy: (Recorded:12Bza9514) to Recorded    Allergies    1  Percocet TABS    Signatures   Electronically signed by :  Brie De Los Santos LCSWMSWBOUBACAR,FABI; Dec  7 2017 11:29AM EST                       (Author)

## 2018-01-23 NOTE — PROGRESS NOTES
Message  placed follow up call to patient  was able to speak with spouse, patient not available  explained that I will attempt to speak to patient's psychiatrist next week but that I also left a message at psychiatrist regarding the information needed  Active Problems    1  Anemia, iron deficiency (280 9) (D50 9)   2  Anxiety (300 00) (F41 9)   3  B12 deficiency (266 2) (E53 8)   4  Bipolar disorder (296 80) (F31 9)   5  Depression with anxiety (300 4) (F41 8)   6  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 3) (K91 2,Z90 3)   7  Morbid obesity (278 01) (E66 01)   8  Nausea (787 02) (R11 0)   9  Obstructive sleep apnea (327 23) (G47 33)   10  OUMOU on CPAP (327 23,V46 8) (G47 33,Z99 89)   11  Polycystic ovarian syndrome (256 4) (E28 2)   12  Preoperative clearance (V72 84) (Z01 818)   13  Recurrent major depressive disorder, remission status unspecified (296 30) (F33 9)   14  S/P bariatric surgery (V45 86) (Z98 84)   15  Screening for lipoid disorders (V77 91) (Z13 220)   16  Shortness of breath (786 05) (R06 02)    Current Meds   1  BuPROPion HCl ER (SR) 150 MG Oral Tablet Extended Release 12 Hour; Therapy: 13RZY0007 to Recorded   2  Calcium Citrate CAPS; Therapy: (Recorded:90Plc2466) to Recorded   3  Carafate 1 GM/10ML Oral Suspension; Therapy: (Recorded:04Oct2017) to Recorded   4  CarBAMazepine 200 MG Oral Tablet; TAKE 1 TABLET TWICE DAILY; Therapy: 65WOT7934 to Recorded   5  Iron Supplement TABS; Therapy: (Recorded:30Iyh2235) to Recorded   6  Methylphenidate HCl - 5 MG Oral Tablet (Ritalin); TAKE 1 TABLET 3 TIMES DAILY @ 800,   1200, 1600; Therapy: 08PHL7757 to (Evaluate:85Ftu3645); Last Rx:15Jun2017 Ordered   7  Multi-Day Oral Tablet; Therapy: (Recorded:48Dml8228) to Recorded   8  Omeprazole 20 MG Oral Capsule Delayed Release; TAKE 1 CAPSULE BY MOUTH   EVERY DAY;    Therapy: 58FYK8201 to (Evaluate:17Jan2018)  Requested for: 04GZF9363; Last   Rx:87Wyp5081; Status: ACTIVE - Retrospective By Protocol Authorization Ordered   9  QUEtiapine Fumarate 50 MG Oral Tablet; Therapy: 85IXN7588 to Recorded   10  Sucralfate 1 GM Oral Tablet; TAKE 1 TABLET 4 TIMES DAILY; Therapy: 77JJT2832 to (Evaluate:07Jan2018)  Requested for: 72XTS8566; Last    Rx:08Dec2017; Status: ACTIVE - Retrospective By Protocol Authorization Ordered   11  Venlafaxine HCl  MG Oral Capsule Extended Release 24 Hour (Effexor XR); Take    1 capsule twice daily; Therapy: 83OLM7276 to (Evaluate:12Dec2017); Last Rx:15Jun2017 Ordered   12  Vitamin D TABS; Therapy: (Recorded:92Hoc7027) to Recorded    Allergies    1  Percocet TABS    Signatures   Electronically signed by :  Gloria Pastor LCSWMSWMSCHRIS,FABI; Dec 29 2017 10:05AM EST                       (Author)

## 2018-01-24 ENCOUNTER — ANESTHESIA (OUTPATIENT)
Dept: PERIOP | Facility: HOSPITAL | Age: 48
DRG: 326 | End: 2018-01-24
Payer: COMMERCIAL

## 2018-01-24 ENCOUNTER — HOSPITAL ENCOUNTER (INPATIENT)
Facility: HOSPITAL | Age: 48
LOS: 5 days | Discharge: HOME | DRG: 326 | End: 2018-01-29
Attending: SURGERY | Admitting: SURGERY
Payer: COMMERCIAL

## 2018-01-24 DIAGNOSIS — R11.0 NAUSEA: ICD-10-CM

## 2018-01-24 DIAGNOSIS — E66.01 MORBID (SEVERE) OBESITY DUE TO EXCESS CALORIES (HCC): ICD-10-CM

## 2018-01-24 DIAGNOSIS — Z98.84 BARIATRIC SURGERY STATUS: ICD-10-CM

## 2018-01-24 DIAGNOSIS — E28.2 POLYCYSTIC OVARIAN SYNDROME: ICD-10-CM

## 2018-01-24 DIAGNOSIS — G47.33 OBSTRUCTIVE SLEEP APNEA: ICD-10-CM

## 2018-01-24 LAB — EXT PREGNANCY TEST URINE: NEGATIVE

## 2018-01-24 PROCEDURE — 43659 UNLISTED LAPS PX STOMACH: CPT | Performed by: SURGERY

## 2018-01-24 PROCEDURE — 81025 URINE PREGNANCY TEST: CPT | Performed by: ANESTHESIOLOGY

## 2018-01-24 PROCEDURE — 8E0W8CZ ROBOTIC ASSISTED PROCEDURE OF TRUNK REGION, VIA NATURAL OR ARTIFICIAL OPENING ENDOSCOPIC: ICD-10-PCS | Performed by: SURGERY

## 2018-01-24 PROCEDURE — 88307 TISSUE EXAM BY PATHOLOGIST: CPT | Performed by: SURGERY

## 2018-01-24 PROCEDURE — C9113 INJ PANTOPRAZOLE SODIUM, VIA: HCPCS | Performed by: SURGERY

## 2018-01-24 PROCEDURE — 88307 TISSUE EXAM BY PATHOLOGIST: CPT | Performed by: PATHOLOGY

## 2018-01-24 PROCEDURE — 0DUA47Z SUPPLEMENT JEJUNUM WITH AUTOLOGOUS TISSUE SUBSTITUTE, PERCUTANEOUS ENDOSCOPIC APPROACH: ICD-10-PCS | Performed by: SURGERY

## 2018-01-24 PROCEDURE — 0D164ZA BYPASS STOMACH TO JEJUNUM, PERCUTANEOUS ENDOSCOPIC APPROACH: ICD-10-PCS | Performed by: SURGERY

## 2018-01-24 PROCEDURE — 0DN64ZZ RELEASE STOMACH, PERCUTANEOUS ENDOSCOPIC APPROACH: ICD-10-PCS | Performed by: SURGERY

## 2018-01-24 PROCEDURE — 0DJ08ZZ INSPECTION OF UPPER INTESTINAL TRACT, VIA NATURAL OR ARTIFICIAL OPENING ENDOSCOPIC: ICD-10-PCS | Performed by: SURGERY

## 2018-01-24 RX ORDER — FENTANYL CITRATE 50 UG/ML
INJECTION, SOLUTION INTRAMUSCULAR; INTRAVENOUS AS NEEDED
Status: DISCONTINUED | OUTPATIENT
Start: 2018-01-24 | End: 2018-01-24 | Stop reason: SURG

## 2018-01-24 RX ORDER — PROMETHAZINE HYDROCHLORIDE 25 MG/ML
25 INJECTION, SOLUTION INTRAMUSCULAR; INTRAVENOUS EVERY 6 HOURS PRN
Status: DISCONTINUED | OUTPATIENT
Start: 2018-01-24 | End: 2018-01-29 | Stop reason: HOSPADM

## 2018-01-24 RX ORDER — ACETAMINOPHEN 160 MG/5ML
325 SUSPENSION, ORAL (FINAL DOSE FORM) ORAL EVERY 4 HOURS PRN
Status: DISCONTINUED | OUTPATIENT
Start: 2018-01-24 | End: 2018-01-26

## 2018-01-24 RX ORDER — GLYCOPYRROLATE 0.2 MG/ML
INJECTION INTRAMUSCULAR; INTRAVENOUS AS NEEDED
Status: DISCONTINUED | OUTPATIENT
Start: 2018-01-24 | End: 2018-01-24 | Stop reason: SURG

## 2018-01-24 RX ORDER — LIDOCAINE HYDROCHLORIDE 10 MG/ML
INJECTION, SOLUTION INFILTRATION; PERINEURAL AS NEEDED
Status: DISCONTINUED | OUTPATIENT
Start: 2018-01-24 | End: 2018-01-24 | Stop reason: SURG

## 2018-01-24 RX ORDER — MIDAZOLAM HYDROCHLORIDE 1 MG/ML
INJECTION INTRAMUSCULAR; INTRAVENOUS AS NEEDED
Status: DISCONTINUED | OUTPATIENT
Start: 2018-01-24 | End: 2018-01-24 | Stop reason: SURG

## 2018-01-24 RX ORDER — FENTANYL CITRATE/PF 50 MCG/ML
50 SYRINGE (ML) INJECTION
Status: COMPLETED | OUTPATIENT
Start: 2018-01-24 | End: 2018-01-24

## 2018-01-24 RX ORDER — PROPOFOL 10 MG/ML
INJECTION, EMULSION INTRAVENOUS AS NEEDED
Status: DISCONTINUED | OUTPATIENT
Start: 2018-01-24 | End: 2018-01-24 | Stop reason: SURG

## 2018-01-24 RX ORDER — HYDROMORPHONE HYDROCHLORIDE 2 MG/ML
INJECTION, SOLUTION INTRAMUSCULAR; INTRAVENOUS; SUBCUTANEOUS AS NEEDED
Status: DISCONTINUED | OUTPATIENT
Start: 2018-01-24 | End: 2018-01-24 | Stop reason: SURG

## 2018-01-24 RX ORDER — SODIUM CHLORIDE, SODIUM LACTATE, POTASSIUM CHLORIDE, CALCIUM CHLORIDE 600; 310; 30; 20 MG/100ML; MG/100ML; MG/100ML; MG/100ML
100 INJECTION, SOLUTION INTRAVENOUS CONTINUOUS
Status: DISCONTINUED | OUTPATIENT
Start: 2018-01-24 | End: 2018-01-29 | Stop reason: HOSPADM

## 2018-01-24 RX ORDER — ONDANSETRON 2 MG/ML
4 INJECTION INTRAMUSCULAR; INTRAVENOUS EVERY 4 HOURS PRN
Status: DISCONTINUED | OUTPATIENT
Start: 2018-01-24 | End: 2018-01-29 | Stop reason: HOSPADM

## 2018-01-24 RX ORDER — PANTOPRAZOLE SODIUM 40 MG/1
40 INJECTION, POWDER, FOR SOLUTION INTRAVENOUS
Status: DISCONTINUED | OUTPATIENT
Start: 2018-01-24 | End: 2018-01-29 | Stop reason: HOSPADM

## 2018-01-24 RX ORDER — SODIUM CHLORIDE 9 MG/ML
100 INJECTION, SOLUTION INTRAVENOUS CONTINUOUS
Status: DISCONTINUED | OUTPATIENT
Start: 2018-01-24 | End: 2018-01-24 | Stop reason: HOSPADM

## 2018-01-24 RX ORDER — SCOLOPAMINE TRANSDERMAL SYSTEM 1 MG/1
1 PATCH, EXTENDED RELEASE TRANSDERMAL ONCE AS NEEDED
Status: DISCONTINUED | OUTPATIENT
Start: 2018-01-24 | End: 2018-01-27

## 2018-01-24 RX ORDER — VECURONIUM BROMIDE 1 MG/ML
INJECTION, POWDER, LYOPHILIZED, FOR SOLUTION INTRAVENOUS AS NEEDED
Status: DISCONTINUED | OUTPATIENT
Start: 2018-01-24 | End: 2018-01-24 | Stop reason: SURG

## 2018-01-24 RX ORDER — HEPARIN SODIUM 5000 [USP'U]/ML
5000 INJECTION, SOLUTION INTRAVENOUS; SUBCUTANEOUS
Status: DISCONTINUED | OUTPATIENT
Start: 2018-01-24 | End: 2018-01-24 | Stop reason: HOSPADM

## 2018-01-24 RX ORDER — BUPIVACAINE HYDROCHLORIDE AND EPINEPHRINE 5; 5 MG/ML; UG/ML
INJECTION, SOLUTION PERINEURAL AS NEEDED
Status: DISCONTINUED | OUTPATIENT
Start: 2018-01-24 | End: 2018-01-24 | Stop reason: HOSPADM

## 2018-01-24 RX ORDER — ONDANSETRON 2 MG/ML
4 INJECTION INTRAMUSCULAR; INTRAVENOUS ONCE AS NEEDED
Status: DISCONTINUED | OUTPATIENT
Start: 2018-01-24 | End: 2018-01-24 | Stop reason: HOSPADM

## 2018-01-24 RX ORDER — OXYCODONE HCL 5 MG/5 ML
5 SOLUTION, ORAL ORAL EVERY 4 HOURS PRN
Status: DISCONTINUED | OUTPATIENT
Start: 2018-01-24 | End: 2018-01-26

## 2018-01-24 RX ORDER — OXYCODONE HCL 5 MG/5 ML
10 SOLUTION, ORAL ORAL EVERY 4 HOURS PRN
Status: DISCONTINUED | OUTPATIENT
Start: 2018-01-24 | End: 2018-01-26

## 2018-01-24 RX ORDER — PROMETHAZINE HYDROCHLORIDE 25 MG/ML
12.5 INJECTION, SOLUTION INTRAMUSCULAR; INTRAVENOUS ONCE AS NEEDED
Status: DISCONTINUED | OUTPATIENT
Start: 2018-01-24 | End: 2018-01-24 | Stop reason: HOSPADM

## 2018-01-24 RX ORDER — ACETAMINOPHEN 160 MG/5ML
320 SUSPENSION, ORAL (FINAL DOSE FORM) ORAL EVERY 4 HOURS PRN
Status: DISCONTINUED | OUTPATIENT
Start: 2018-01-24 | End: 2018-01-29 | Stop reason: HOSPADM

## 2018-01-24 RX ORDER — METOCLOPRAMIDE HYDROCHLORIDE 5 MG/ML
10 INJECTION INTRAMUSCULAR; INTRAVENOUS EVERY 6 HOURS SCHEDULED
Status: DISCONTINUED | OUTPATIENT
Start: 2018-01-24 | End: 2018-01-29 | Stop reason: HOSPADM

## 2018-01-24 RX ORDER — MORPHINE SULFATE 2 MG/ML
2 INJECTION, SOLUTION INTRAMUSCULAR; INTRAVENOUS EVERY 2 HOUR PRN
Status: DISCONTINUED | OUTPATIENT
Start: 2018-01-24 | End: 2018-01-29 | Stop reason: HOSPADM

## 2018-01-24 RX ORDER — MORPHINE SULFATE 4 MG/ML
4 INJECTION, SOLUTION INTRAMUSCULAR; INTRAVENOUS EVERY 2 HOUR PRN
Status: DISCONTINUED | OUTPATIENT
Start: 2018-01-24 | End: 2018-01-29 | Stop reason: HOSPADM

## 2018-01-24 RX ADMIN — HYDROMORPHONE HYDROCHLORIDE 1 MG: 2 INJECTION, SOLUTION INTRAMUSCULAR; INTRAVENOUS; SUBCUTANEOUS at 08:55

## 2018-01-24 RX ADMIN — VECURONIUM BROMIDE 3 MG: 1 INJECTION, POWDER, LYOPHILIZED, FOR SOLUTION INTRAVENOUS at 08:55

## 2018-01-24 RX ADMIN — NEOSTIGMINE METHYLSULFATE 3 MG: 1 INJECTION, SOLUTION INTRAMUSCULAR; INTRAVENOUS; SUBCUTANEOUS at 12:03

## 2018-01-24 RX ADMIN — FENTANYL CITRATE 50 MCG: 50 INJECTION INTRAMUSCULAR; INTRAVENOUS at 10:57

## 2018-01-24 RX ADMIN — MORPHINE SULFATE 4 MG: 4 INJECTION, SOLUTION INTRAMUSCULAR; INTRAVENOUS at 23:50

## 2018-01-24 RX ADMIN — FENTANYL CITRATE 50 MCG: 50 INJECTION INTRAMUSCULAR; INTRAVENOUS at 10:18

## 2018-01-24 RX ADMIN — GLYCOPYRROLATE 0.4 MG: 0.2 INJECTION, SOLUTION INTRAMUSCULAR; INTRAVENOUS at 12:03

## 2018-01-24 RX ADMIN — PANTOPRAZOLE SODIUM 40 MG: 40 INJECTION, POWDER, FOR SOLUTION INTRAVENOUS at 17:54

## 2018-01-24 RX ADMIN — FENTANYL CITRATE 50 MCG: 50 INJECTION, SOLUTION INTRAMUSCULAR; INTRAVENOUS at 12:58

## 2018-01-24 RX ADMIN — FENTANYL CITRATE 100 MCG: 50 INJECTION INTRAMUSCULAR; INTRAVENOUS at 08:06

## 2018-01-24 RX ADMIN — SODIUM CHLORIDE: 0.9 INJECTION, SOLUTION INTRAVENOUS at 08:10

## 2018-01-24 RX ADMIN — HYDROMORPHONE HYDROCHLORIDE 1 MG: 2 INJECTION, SOLUTION INTRAMUSCULAR; INTRAVENOUS; SUBCUTANEOUS at 08:06

## 2018-01-24 RX ADMIN — SODIUM CHLORIDE, SODIUM LACTATE, POTASSIUM CHLORIDE, AND CALCIUM CHLORIDE 125 ML/HR: .6; .31; .03; .02 INJECTION, SOLUTION INTRAVENOUS at 14:31

## 2018-01-24 RX ADMIN — VECURONIUM BROMIDE 3 MG: 1 INJECTION, POWDER, LYOPHILIZED, FOR SOLUTION INTRAVENOUS at 10:36

## 2018-01-24 RX ADMIN — SODIUM CHLORIDE: 0.9 INJECTION, SOLUTION INTRAVENOUS at 12:16

## 2018-01-24 RX ADMIN — HYDROMORPHONE HYDROCHLORIDE 0.5 MG: 1 INJECTION, SOLUTION INTRAMUSCULAR; INTRAVENOUS; SUBCUTANEOUS at 14:25

## 2018-01-24 RX ADMIN — VECURONIUM BROMIDE 3 MG: 1 INJECTION, POWDER, LYOPHILIZED, FOR SOLUTION INTRAVENOUS at 08:06

## 2018-01-24 RX ADMIN — FENTANYL CITRATE 50 MCG: 50 INJECTION, SOLUTION INTRAMUSCULAR; INTRAVENOUS at 13:14

## 2018-01-24 RX ADMIN — FENTANYL CITRATE 50 MCG: 50 INJECTION, SOLUTION INTRAMUSCULAR; INTRAVENOUS at 12:50

## 2018-01-24 RX ADMIN — MIDAZOLAM HYDROCHLORIDE 2 MG: 1 INJECTION, SOLUTION INTRAMUSCULAR; INTRAVENOUS at 07:32

## 2018-01-24 RX ADMIN — FENTANYL CITRATE 100 MCG: 50 INJECTION INTRAMUSCULAR; INTRAVENOUS at 07:38

## 2018-01-24 RX ADMIN — FENTANYL CITRATE 50 MCG: 50 INJECTION, SOLUTION INTRAMUSCULAR; INTRAVENOUS at 13:05

## 2018-01-24 RX ADMIN — SCOPALAMINE 1 PATCH: 1 PATCH, EXTENDED RELEASE TRANSDERMAL at 06:23

## 2018-01-24 RX ADMIN — FENTANYL CITRATE 50 MCG: 50 INJECTION INTRAMUSCULAR; INTRAVENOUS at 11:56

## 2018-01-24 RX ADMIN — HEPARIN SODIUM 5000 UNITS: 5000 INJECTION, SOLUTION INTRAVENOUS; SUBCUTANEOUS at 07:03

## 2018-01-24 RX ADMIN — HYDROMORPHONE HYDROCHLORIDE 0.5 MG: 1 INJECTION, SOLUTION INTRAMUSCULAR; INTRAVENOUS; SUBCUTANEOUS at 14:13

## 2018-01-24 RX ADMIN — HYDROMORPHONE HYDROCHLORIDE 0.5 MG: 2 INJECTION, SOLUTION INTRAMUSCULAR; INTRAVENOUS; SUBCUTANEOUS at 09:45

## 2018-01-24 RX ADMIN — METOCLOPRAMIDE 10 MG: 5 INJECTION, SOLUTION INTRAMUSCULAR; INTRAVENOUS at 23:51

## 2018-01-24 RX ADMIN — VECURONIUM BROMIDE 7 MG: 1 INJECTION, POWDER, LYOPHILIZED, FOR SOLUTION INTRAVENOUS at 07:38

## 2018-01-24 RX ADMIN — FENTANYL CITRATE 50 MCG: 50 INJECTION INTRAMUSCULAR; INTRAVENOUS at 10:21

## 2018-01-24 RX ADMIN — MORPHINE SULFATE 4 MG: 4 INJECTION, SOLUTION INTRAMUSCULAR; INTRAVENOUS at 18:38

## 2018-01-24 RX ADMIN — SODIUM CHLORIDE: 0.9 INJECTION, SOLUTION INTRAVENOUS at 08:54

## 2018-01-24 RX ADMIN — METRONIDAZOLE 500 MG: 500 INJECTION, SOLUTION INTRAVENOUS at 20:55

## 2018-01-24 RX ADMIN — HYDROMORPHONE HYDROCHLORIDE 0.5 MG: 1 INJECTION, SOLUTION INTRAMUSCULAR; INTRAVENOUS; SUBCUTANEOUS at 14:00

## 2018-01-24 RX ADMIN — VECURONIUM BROMIDE 3 MG: 1 INJECTION, POWDER, LYOPHILIZED, FOR SOLUTION INTRAVENOUS at 09:45

## 2018-01-24 RX ADMIN — VECURONIUM BROMIDE 3 MG: 1 INJECTION, POWDER, LYOPHILIZED, FOR SOLUTION INTRAVENOUS at 10:09

## 2018-01-24 RX ADMIN — SODIUM CHLORIDE: 0.9 INJECTION, SOLUTION INTRAVENOUS at 11:19

## 2018-01-24 RX ADMIN — VECURONIUM BROMIDE 1 MG: 1 INJECTION, POWDER, LYOPHILIZED, FOR SOLUTION INTRAVENOUS at 07:39

## 2018-01-24 RX ADMIN — CEFAZOLIN SODIUM 2000 MG: 2 SOLUTION INTRAVENOUS at 20:08

## 2018-01-24 RX ADMIN — FENTANYL CITRATE 50 MCG: 50 INJECTION INTRAMUSCULAR; INTRAVENOUS at 11:10

## 2018-01-24 RX ADMIN — METRONIDAZOLE 500 MG: 500 INJECTION, SOLUTION INTRAVENOUS at 08:02

## 2018-01-24 RX ADMIN — LIDOCAINE HYDROCHLORIDE 60 MG: 10 INJECTION, SOLUTION INFILTRATION; PERINEURAL at 07:38

## 2018-01-24 RX ADMIN — METOCLOPRAMIDE 10 MG: 5 INJECTION, SOLUTION INTRAMUSCULAR; INTRAVENOUS at 17:54

## 2018-01-24 RX ADMIN — VECURONIUM BROMIDE 2 MG: 1 INJECTION, POWDER, LYOPHILIZED, FOR SOLUTION INTRAVENOUS at 11:10

## 2018-01-24 RX ADMIN — CEFAZOLIN SODIUM 2000 MG: 2 SOLUTION INTRAVENOUS at 08:02

## 2018-01-24 RX ADMIN — SODIUM CHLORIDE 100 ML/HR: 0.9 INJECTION, SOLUTION INTRAVENOUS at 06:37

## 2018-01-24 RX ADMIN — CEFAZOLIN SODIUM 2000 MG: 2 SOLUTION INTRAVENOUS at 11:58

## 2018-01-24 RX ADMIN — HYDROMORPHONE HYDROCHLORIDE 0.5 MG: 2 INJECTION, SOLUTION INTRAMUSCULAR; INTRAVENOUS; SUBCUTANEOUS at 10:21

## 2018-01-24 RX ADMIN — FENTANYL CITRATE 50 MCG: 50 INJECTION INTRAMUSCULAR; INTRAVENOUS at 10:36

## 2018-01-24 RX ADMIN — VECURONIUM BROMIDE 1 MG: 1 INJECTION, POWDER, LYOPHILIZED, FOR SOLUTION INTRAVENOUS at 11:57

## 2018-01-24 RX ADMIN — VECURONIUM BROMIDE 3 MG: 1 INJECTION, POWDER, LYOPHILIZED, FOR SOLUTION INTRAVENOUS at 08:20

## 2018-01-24 RX ADMIN — PROPOFOL 200 MG: 10 INJECTION, EMULSION INTRAVENOUS at 07:38

## 2018-01-24 RX ADMIN — HYDROMORPHONE HYDROCHLORIDE 0.5 MG: 1 INJECTION, SOLUTION INTRAMUSCULAR; INTRAVENOUS; SUBCUTANEOUS at 13:41

## 2018-01-24 NOTE — OP NOTE
OPERATIVE REPORT  PATIENT NAME: Kareem Sanz    :  1970  MRN: [id number]  Pt Location: [facility] 06    SURGERY DATE: 2018    Surgeon(s) and Role:     * Pasha Nelson MD - Primary          * Va Perry MD - Assisting    Preop Diagnosis:  Morbid (severe) obesity due to excess calories ([address] ) [E66 01]Body mass index is 37 93 kg/m²  Nausea [R11 0]  Obstructive sleep apnea [G47 33]  Polycystic ovarian syndrome [E28 2]  Bariatric surgery status [Z98 84]    Post-Op Diagnosis Codes:     * Morbid (severe) obesity due to excess calories ([address] ) [E66 01]     * Nausea [R11 0]     * Obstructive sleep apnea [G47 33]     * Polycystic ovarian syndrome [E28 2]     * Bariatric surgery status [Z98 84]    Procedure(s) (LRB):  LAPAROSCOPIC REVISION OF IVANNA-EN-Y GASTRIC BYPASS ROBOTICALLY ASSISTED (N/A)  INTRAOPERATIVE ESOPHAGOGASTRODUODENOSCOPY (EGD) (N/A)    Specimen(s):  ID Type Source Tests Collected by Time Destination   1 : PORTION OF SMALL BOWEL AND STOMACH Tissue Stomach TISSUE EXAM Pasha Nelson MD 2018 1158        Estimated Blood Loss:   Minimal    Drains:       Anesthesia Type:   General    Operative Indications: Morbid (severe) obesity due to excess calories ([address] ) [E66 01]  Nausea [R11 0]  Obstructive sleep apnea [G47 33]  Polycystic ovarian syndrome [E28 2]  Bariatric surgery status [Z98 84]      Operative Findings:  Perforated marginal Ulcer  Non excluded Fundus    Complications:   None    Procedure and Technique:  1  Laparoscopic partial gastrectomy robotic assist  2  Laparoscopic small bowel resection robotic assist  3    Laparoscopic gastrojejunostomy with intraop endoscopy robotic assist   I was present for the entire procedure    Patient Disposition:  hemodynamically stable     Indication:  Patient is a 55-year-old female underwent a laparoscopic ivanna en y gastric bypass at an outside institution and presented with a chronic marginal ulcer patient failed medical management and was found to have a large gastric pouch with a non-excluded fundus she was consented for laparoscopic revision of her pouch and her anastomosis  Description of the procedure: The patient was placed in the supine position  The patient received a dose of IV antibiotics and a dose of subcutaneous heparin prior to the procedure   The patient was induced and intubated under general endotracheal anesthesia  The abdominal wall was prepped and draped under sterile conditions in the usual fashion       After calling a time-out, we started the procedure by making an incision to the left of the midline 6 inches from the xiphoid, and the abdominal cavity was accessed using an Optiview trocar  The abdominal cavity was first insufflated with CO2 to a pressure of 15 mmHg using a Veress needle   The abdominal cavity was inspected and there was no evidence of tumors or lesions  There was no evidence of injury to the bowel or bleeding from the insertion of the Optiview trocar   At that point, two 8-mm trocars were placed under direct visualization on the left side of the abdominal wall and two other ( 12-mm  And 8-mm) trocars were placed on the right side of the abdominal wall, all under direct visualization       Patient was placed in reverse Trendelenburg position,  a Chloé liver retractor was placed through a stab incision in the subxiphoid space, and the left lobe of the liver was retracted medially   Patient was then placed in the supine position and the robot was docked   There were dense intra-abdominal adhesions between the left lobe of the liver and the gastric pouch these were taken down with the Harmonic scalpel/ An OG tube was placed in the stomach to decompress the stomach pouch and was then removed    At that point I turned my attention to the gastric pouch the gastric pouch was  from the gastric remnant using a Harmonic scalpel, the staple line of the gastric remnant was then imbricated using 2-0 V loch in a running fashion  Of note the gastric pouch was very large and the fundus was not excluded  I then turned my attention to the gastrojejunostomy anastomosis which was dissected from the surrounding tissue using a Harmonic scalpel there was dense inflammatory reaction consistent with a history of marginal ulcer which appeared to have perforated posteriorly into the gastric remnant  The posterior wall of the marginal ulcer was  adherent to the anterior wall of gastric remnant  The small bowel was transected with an Endo-VINAY stapler with a white load and then the gastric pouch was transected using purple load distal to the left gastric bundle and the fundus was also excluded with another firing of the stapler again using a purple load  A new gastrojejunostomy anastomosis was then fashioned in an antecolic antegastric fashion in 2 layers  Outer layer was a running layer of 2-0 V lock and the inner  layer was a running 2-0 Vicryl suture  Upper endoscopy was performed showed evidence of bubbles on the right corner this was easily controlled with a simple 2-0 Vicryl suture  Repeat upper endoscopy showed no evidence of bubbles to suggest leak  The staple line of the gastric pouch was also imbricated using 2-0 V lock suture  The gastrojejunostomy was covered with an omental flap that was secured in place with an absorbable suture in a simple fashion  The Chloé liver retractor was removed  Surgical field was sprayed with fibrin glue for hemostasis  The midline incision was both closed with 1-0 Vicryl in a simple fashion  A 19  [place] MADONNA drain was left in place  All the skin edges of the trocar sites were approximated with 4-0 Monocryl in a subcuticular inverted fashion  The patient was extubated and transferred to the PACU in stable condition      SIGNATURE: Shubham Zambrano MD  DATE: January 24, 2018  TIME: 12:10 PM

## 2018-01-24 NOTE — PLAN OF CARE
DISCHARGE PLANNING     Discharge to home or other facility with appropriate resources Progressing        GASTROINTESTINAL - ADULT     Minimal or absence of nausea and/or vomiting Progressing     Maintains or returns to baseline bowel function Progressing     Maintains adequate nutritional intake Progressing        HEMATOLOGIC - ADULT     Maintains hematologic stability Progressing        INFECTION - ADULT     Absence or prevention of progression during hospitalization Progressing     Absence of fever/infection during neutropenic period Progressing        Knowledge Deficit     Patient/family/caregiver demonstrates understanding of disease process, treatment plan, medications, and discharge instructions Progressing        PAIN - ADULT     Verbalizes/displays adequate comfort level or baseline comfort level Progressing        Potential for Falls     Patient will remain free of falls Progressing        SAFETY ADULT     Maintain or return to baseline ADL function Progressing     Maintain or return mobility status to optimal level Progressing

## 2018-01-24 NOTE — ADDENDUM NOTE
Addendum  created 01/24/18 0126 by Danish Paniagua DO    Actions taken from a BestPractice Advisory, Order list changed

## 2018-01-25 ENCOUNTER — APPOINTMENT (INPATIENT)
Dept: RADIOLOGY | Facility: HOSPITAL | Age: 48
DRG: 326 | End: 2018-01-25
Payer: COMMERCIAL

## 2018-01-25 PROBLEM — E66.01 MORBID OBESITY DUE TO EXCESS CALORIES (HCC): Status: ACTIVE | Noted: 2018-01-25

## 2018-01-25 LAB
ANION GAP SERPL CALCULATED.3IONS-SCNC: 9 MMOL/L (ref 4–13)
BUN SERPL-MCNC: 8 MG/DL (ref 5–25)
CALCIUM SERPL-MCNC: 7.8 MG/DL (ref 8.3–10.1)
CHLORIDE SERPL-SCNC: 109 MMOL/L (ref 100–108)
CO2 SERPL-SCNC: 25 MMOL/L (ref 21–32)
CREAT SERPL-MCNC: 0.71 MG/DL (ref 0.6–1.3)
ERYTHROCYTE [DISTWIDTH] IN BLOOD BY AUTOMATED COUNT: 21.4 % (ref 11.6–15.1)
GFR SERPL CREATININE-BSD FRML MDRD: 102 ML/MIN/1.73SQ M
GLUCOSE SERPL-MCNC: 114 MG/DL (ref 65–140)
HCT VFR BLD AUTO: 35.2 % (ref 34.8–46.1)
HGB BLD-MCNC: 10.8 G/DL (ref 11.5–15.4)
MCH RBC QN AUTO: 28.6 PG (ref 26.8–34.3)
MCHC RBC AUTO-ENTMCNC: 30.7 G/DL (ref 31.4–37.4)
MCV RBC AUTO: 93 FL (ref 82–98)
PLATELET # BLD AUTO: 186 THOUSANDS/UL (ref 149–390)
PMV BLD AUTO: 9.5 FL (ref 8.9–12.7)
POTASSIUM SERPL-SCNC: 3.8 MMOL/L (ref 3.5–5.3)
RBC # BLD AUTO: 3.78 MILLION/UL (ref 3.81–5.12)
SODIUM SERPL-SCNC: 143 MMOL/L (ref 136–145)
WBC # BLD AUTO: 6.91 THOUSAND/UL (ref 4.31–10.16)

## 2018-01-25 PROCEDURE — 80048 BASIC METABOLIC PNL TOTAL CA: CPT | Performed by: SURGERY

## 2018-01-25 PROCEDURE — 85027 COMPLETE CBC AUTOMATED: CPT | Performed by: SURGERY

## 2018-01-25 PROCEDURE — 99253 IP/OBS CNSLTJ NEW/EST LOW 45: CPT | Performed by: FAMILY MEDICINE

## 2018-01-25 PROCEDURE — C9113 INJ PANTOPRAZOLE SODIUM, VIA: HCPCS | Performed by: SURGERY

## 2018-01-25 PROCEDURE — 74240 X-RAY XM UPR GI TRC 1CNTRST: CPT

## 2018-01-25 PROCEDURE — 99253 IP/OBS CNSLTJ NEW/EST LOW 45: CPT | Performed by: PHYSICIAN ASSISTANT

## 2018-01-25 RX ADMIN — MORPHINE SULFATE 4 MG: 4 INJECTION, SOLUTION INTRAMUSCULAR; INTRAVENOUS at 08:11

## 2018-01-25 RX ADMIN — SODIUM CHLORIDE, SODIUM LACTATE, POTASSIUM CHLORIDE, AND CALCIUM CHLORIDE 125 ML/HR: .6; .31; .03; .02 INJECTION, SOLUTION INTRAVENOUS at 08:12

## 2018-01-25 RX ADMIN — METOCLOPRAMIDE 10 MG: 5 INJECTION, SOLUTION INTRAMUSCULAR; INTRAVENOUS at 17:44

## 2018-01-25 RX ADMIN — SODIUM CHLORIDE, SODIUM LACTATE, POTASSIUM CHLORIDE, AND CALCIUM CHLORIDE 125 ML/HR: .6; .31; .03; .02 INJECTION, SOLUTION INTRAVENOUS at 23:26

## 2018-01-25 RX ADMIN — METOCLOPRAMIDE 10 MG: 5 INJECTION, SOLUTION INTRAMUSCULAR; INTRAVENOUS at 23:24

## 2018-01-25 RX ADMIN — PANTOPRAZOLE SODIUM 40 MG: 40 INJECTION, POWDER, FOR SOLUTION INTRAVENOUS at 08:11

## 2018-01-25 RX ADMIN — METOCLOPRAMIDE 10 MG: 5 INJECTION, SOLUTION INTRAMUSCULAR; INTRAVENOUS at 12:55

## 2018-01-25 RX ADMIN — METRONIDAZOLE 500 MG: 500 INJECTION, SOLUTION INTRAVENOUS at 04:44

## 2018-01-25 RX ADMIN — SODIUM CHLORIDE, SODIUM LACTATE, POTASSIUM CHLORIDE, AND CALCIUM CHLORIDE 125 ML/HR: .6; .31; .03; .02 INJECTION, SOLUTION INTRAVENOUS at 16:08

## 2018-01-25 RX ADMIN — MORPHINE SULFATE 4 MG: 4 INJECTION, SOLUTION INTRAMUSCULAR; INTRAVENOUS at 19:40

## 2018-01-25 RX ADMIN — MORPHINE SULFATE 4 MG: 4 INJECTION, SOLUTION INTRAMUSCULAR; INTRAVENOUS at 04:26

## 2018-01-25 RX ADMIN — IOHEXOL 100 ML: 350 INJECTION, SOLUTION INTRAVENOUS at 11:15

## 2018-01-25 RX ADMIN — MORPHINE SULFATE 4 MG: 4 INJECTION, SOLUTION INTRAMUSCULAR; INTRAVENOUS at 12:55

## 2018-01-25 RX ADMIN — ACETAMINOPHEN 320 MG: 160 SUSPENSION ORAL at 19:50

## 2018-01-25 RX ADMIN — MORPHINE SULFATE 4 MG: 4 INJECTION, SOLUTION INTRAMUSCULAR; INTRAVENOUS at 23:24

## 2018-01-25 RX ADMIN — METOCLOPRAMIDE 10 MG: 5 INJECTION, SOLUTION INTRAMUSCULAR; INTRAVENOUS at 05:24

## 2018-01-25 RX ADMIN — MORPHINE SULFATE 4 MG: 4 INJECTION, SOLUTION INTRAMUSCULAR; INTRAVENOUS at 16:08

## 2018-01-25 RX ADMIN — CEFAZOLIN SODIUM 2000 MG: 2 SOLUTION INTRAVENOUS at 04:09

## 2018-01-25 NOTE — CONSULTS
Consultation: [address] [facility] Internal Medicine / Hospitalist    Garrick Ahn 56 y o  female MRN: [id number]  Unit/Bed#: E5 -01 Encounter: [id number]      Reason for Consult / Principal Problem: Postoperative medical management /morbid obesity status post revision of RYGB       Assessment/Plan     Assessment:  Patient is a 64-year-old female who has a history previous are why GB  She had weight loss initially  However she gained weight and was having pain and malnutrition  She was noted to have a marginal ulcer and markedly dilated gastric pouch in October 2017  She presented yesterday for revision of her RYGB  She tolerated the procedure well without complications and her estimated blood loss was minimal   We are asked to consult for postoperative medical management    Plan:  1  Morbid obesity status post Laparoscopic revision of RYGB POD # 1 - per primary team   The patient is still NPO status post upper GI which showed no evidence of any leak  2  Obstructive sleep apnea-non compliant with CPAP  3  Iron deficiency Anemia with malnutrition-the patient does get periodic Venofer infusions  Hemoglobin is at 10 8 today  According to care everywhere her hemoglobin is between 10 and 11  Monitor & follow up with hematology  4  Bipolar disorder -her psychiatric medications are currently held given her n p o  status  She is currently stable      HPI: Garrick Ahn is a 56y o  year old female who presents status post revision of her RYGB  She is complaining of the expected postoperative abdominal pain  She would like to eat something  She is nauseous but no vomiting  She denies any fevers chills chest pain or shortness of breath      ROS:  A 12-point review of systems was done  Please see the HPI for the full details  All other systems negative  HISTORY:  Principal Problem:     Morbid obesity due to excess calories [facility])  Active Problems:    Sleep apnea    Morbid obesity ([facility] [address] )    History of bariatric surgery    Bipolar disorder ([facility] )    Anemia      Past Medical History:   Diagnosis Date    Anemia     iron deicient    Anxiety     panic disorder    Bipolar disorder ([facility] )     Dental cavity     lower left back removed on 1/5/18 and sutures present to be removed 1/12     Depression     GERD (gastroesophageal reflux disease)     History of bariatric surgery     RUEN-Y  10/2006  wt loss 166lb w/ gain now    Hypoglycemia     Morbid obesity ([facility] )     Nausea     PCOS (polycystic ovarian syndrome)     Shortness of breath     going up stairs    Sleep apnea     "doesn't use machine"    Stomach ulcer     Tooth loose     upper front     Past Surgical History:   Procedure Laterality Date    BARIATRIC SURGERY      approx 11 yrs ago    CHOLECYSTECTOMY      ESOPHAGOGASTRODUODENOSCOPY N/A 1/24/2018    Procedure: INTRAOPERATIVE ESOPHAGOGASTRODUODENOSCOPY (EGD); Surgeon: Tabitha Snider MD;  Location: [facility];  Service: Bariatrics    UT EGD TRANSORAL BIOPSY SINGLE/MULTIPLE N/A 10/4/2017    Procedure: ESOPHAGOGASTRODUODENOSCOPY (EGD) with biopsy;  Surgeon: Tabitha Snider MD;  Location: [facility] LAB;   Service: Bariatrics    REVISION BYPASS LAPAROSCOPIC N/A 1/24/2018    Procedure: LAPAROSCOPIC REVISION OF REINALDO-EN-Y GASTRIC BYPASS ROBOTICALLY ASSISTED;  Surgeon: Tabitha Snider MD;  Location: [facility];  Service: Bariatrics    TOTAL BODY LIFT      approx 2008    UPPER GASTROINTESTINAL ENDOSCOPY      WISDOM TOOTH EXTRACTION       Social History     Social History    Marital status: /Civil Union     Spouse name: N/A    Number of children: N/A    Years of education: N/A     Social History Main Topics    Smoking status: Former Smoker     Packs/day: 0 25     Years: 9 00     Types: Cigarettes     Quit date: 1993    Smokeless tobacco: Never Used      Comment: quit approx 20 yrs ago    Alcohol use No      Comment: recovered alcoholic for 4 yrs    Drug use: No    Sexual activity: Not Asked     Other Topics Concern    None     Social History Narrative    None     History reviewed  No pertinent family history      MED/ALLERGIES:  Prescriptions Prior to Admission   Medication    acetaminophen (TYLENOL) 500 mg tablet    buPROPion (WELLBUTRIN XL) 150 mg 24 hr tablet    calcium citrate (CALCITRATE) 950 MG tablet    carBAMazepine (TEGretol) 200 mg tablet    cholecalciferol (VITAMIN D3) 1,000 units tablet    Cyanocobalamin (VITAMIN B-12 IJ)    Cyanocobalamin (VITAMIN B-12 SL)    ferrous sulfate 325 (65 Fe) mg tablet    methylphenidate (RITALIN) 5 mg tablet    Multiple Vitamin (MULTIVITAMIN) capsule    omeprazole (PriLOSEC OTC) 20 MG tablet    QUEtiapine (SEROquel) 50 mg tablet    sucralfate (CARAFATE) 1 g tablet    venlafaxine (EFFEXOR-XR) 150 mg 24 hr capsule     Allergies   Allergen Reactions    Percocet [Oxycodone-Acetaminophen] Shortness Of Breath     "Shallow breathing"    Latex Hives     Old gloves caused redness,        OBJECTIVE:    Current Vitals:   Blood Pressure: 145/69 (01/25/18 1138)  Pulse: 89 (01/25/18 1138)  Temperature: 98 5 °F (36 9 °C) (01/25/18 1138)  Temp Source: Temporal (01/25/18 1138)  Respirations: 18 (01/25/18 1138)  Height: 5' 5 75" (167 cm) (01/11/18 1305)  Weight - Scale: 105 kg (231 lb 7 oz) (01/24/18 0638)  SpO2: 99 % (01/25/18 1138)      Intake/Output Summary (Last 24 hours) at 01/25/18 1459  Last data filed at 01/25/18 1020   Gross per 24 hour   Intake                0 ml   Output              270 ml   Net             -270 ml       Invasive Devices     Peripheral Intravenous Line            Peripheral IV 01/24/18 Left Wrist 1 day    Peripheral IV 01/24/18 Right Hand 1 day          Drain            Closed/Suction Drain Left;Right Abdomen Bulb 19 Fr  1 day                 Physical Exam  General Appearance:    Alert, cooperative, no distress, appears stated age   HEENT:    Atraumatic, EOMs intact, Mucous membranes dry without   exudates   Neck:   Supple, symmetrical, trachea midline, no adenopathy;     thyroid:  no enlargement/tenderness/nodules; no carotid    bruit or JVD   Lungs:     Clear to auscultation bilaterally, respirations unlabored   Chest Wall:    No tenderness or deformity    Heart:    Regular rate and rhythm, S1 and S2 normal, no murmur, rub    or gallop   Abdomen:    bowel sounds decreased due to body habitus, obese    no masses, no organomegaly   Extremities:   Extremities normal, atraumatic, no cyanosis or edema, + SCD b/l   Pulses:   2+ and symmetric all extremities   Skin:   Pale overall, no rashes or lesions   Lymph nodes:   Cervical, supraclavicular, and axillary nodes normal   Neurologic:   CNII-XII intact, normal strength, sensation and reflexes     throughout     Lab Results: I have personally reviewed pertinent reports  Results from last 7 days  Lab Units 01/25/18  0535   WBC Thousand/uL 6 91   HEMOGLOBIN g/dL 10 8*   HEMATOCRIT % 35 2   PLATELETS Thousands/uL 186      Results from last 7 days  Lab Units 01/25/18  0535   SODIUM mmol/L 143   POTASSIUM mmol/L 3 8   CHLORIDE mmol/L 109*   CO2 mmol/L 25   BUN mg/dL 8   CREATININE mg/dL 0 71   CALCIUM mg/dL 7 8*   GLUCOSE RANDOM mg/dL 114     No results found for: CKTOTAL, CKMB, CKMBINDEX, TROPONINI    Imaging Studies: I have personally reviewed pertinent reports  EKG, Pathology, and Other Studies: I have personally reviewed pertinent reports  VTE Prophylaxis: per primary team    Counseling / Coordination of Care  Total floor / unit time spent today 30 minutes  minutes  Greater than 50% of total time was spent with the patient and / or family counseling and / or coordination of care  This note has been constructed using a voice recognition system

## 2018-01-25 NOTE — CASE MANAGEMENT
Thank you,  [facility] in the [facility] by Kyler Gonzalez for 2017  Network Utilization Review Department  Phone: [phone]; Fax [phone]  ATTENTION: The Network Utilization Review Department is now centralized for our 7 Facilities  Make a note that we have a new phone and fax numbers for our Department  Please call with any questions or concerns to [phone] and carefully follow the prompts so that you are directed to the right person  All voicemails are confidential  Fax any determinations, approvals, denials, and requests for initial or continue stay review clinical to [phone]  Due to HIGH CALL volume, it would be easier if you could please send faxed requests to expedite your requests and in part, help us provide discharge notifications faster    Initial Clinical Review    Age/Sex: 56 y o  female    Surgery Date: 1/24/18    Procedure: LAPAROSCOPIC REVISION OF REINALDO-EN-Y GASTRIC BYPASS ROBOTICALLY ASSISTED (N/A)  INTRAOPERATIVE ESOPHAGOGASTRODUODENOSCOPY (EGD) (N/A)    Anesthesia: General    Admission Orders: Date/Time/Statement: 1/24/18 @ 4158     Orders Placed This Encounter   Procedures    Inpatient Admission     Standing Status:   Standing     Number of Occurrences:   1     Order Specific Question:   Admitting Physician     Answer:   VANNESA Tamayo [[id number]]     Order Specific Question:   Level of Care     Answer:   Med Surg [16]     Order Specific Question:   Bed Type     Answer:   Bariatric [1]     Order Specific Question:   Bed request comments     Answer:   Telemetry     Order Specific Question:   Estimated length of stay     Answer:   One Midnight       Vital Signs: /58 (BP Location: Right arm)   Pulse 92   Temp 98 2 °F (36 8 °C) (Temporal)   Resp 18   Ht 5' 5 75" (1 67 m)   Wt 105 kg (231 lb 7 oz)   LMP  (LMP Unknown)   SpO2 98%   BMI 37 93 kg/m²     Diet:        Diet Orders            Start     Ordered    01/24/18 1515  Diet NPO  Diet effective now     Question Answer Comment   Diet Type NPO    RD to adjust diet per protocol?  Yes        01/24/18 3526          Mobility: AMBULATE    DVT Prophylaxis: SEQ COMP DEVICE    Pain Control:   Pain Medications             acetaminophen (TYLENOL) 500 mg tablet Take 500 mg by mouth every 6 (six) hours as needed for mild pain    buPROPion (WELLBUTRIN XL) 150 mg 24 hr tablet Take 150 mg by mouth 2 (two) times a day      carBAMazepine (TEGretol) 200 mg tablet Take 200 mg by mouth 2 (two) times a day      QUEtiapine (SEROquel) 50 mg tablet Take 50 mg by mouth daily at bedtime    venlafaxine (EFFEXOR-XR) 150 mg 24 hr capsule Take 150 mg by mouth daily        INPT [email or web address]  TELE    Scheduled Meds:  metoclopramide 10 mg Intravenous Q6H CHARLEE   pantoprazole 40 mg Intravenous Q24H [facility]     Continuous Infusions:  lactated ringers 125 mL/hr Last Rate: 125 mL/hr (01/25/18 0812)     PRN Meds:   acetaminophen    oxyCODONE **AND** acetaminophen    oxyCODONE **AND** acetaminophen    morphine injection X4    morphine injection    ondansetron    phenol    promethazine

## 2018-01-25 NOTE — POST OP PROGRESS NOTES
Progress Note - General Surgery   Cyndee Puga 56 y o  female MRN: [id number]  Unit/Bed#: E5 -01 Encounter: [id number]      Subjective/Objective     Subjective: Patient with morbid obesity s/p Procedure(s):  LAPAROSCOPIC REVISION OF REINALDO-EN-Y GASTRIC BYPASS ROBOTICALLY ASSISTED  INTRAOPERATIVE ESOPHAGOGASTRODUODENOSCOPY (EGD) POD #1   Patient is NPO awaiting UGI study  No nausea or vomiting, pain controlled on pain medication, ambulating without assistance, voiding well, using incentive spirometer  Objective:    /58 (BP Location: Right arm)   Pulse 92   Temp 98 2 °F (36 8 °C) (Temporal)   Resp 18   Ht 5' 5 75" (1 67 m)   Wt 105 kg (231 lb 7 oz)   LMP  (LMP Unknown)   SpO2 98%   BMI 37 93 kg/m²       Intake/Output Summary (Last 24 hours) at 01/25/18 0849  Last data filed at 01/25/18 0449   Gross per 24 hour   Intake             3700 ml   Output              490 ml   Net             3210 ml       Invasive Devices     Peripheral Intravenous Line            Peripheral IV 01/24/18 Right Hand 1 day    Peripheral IV 01/24/18 Left Wrist less than 1 day          Drain            Closed/Suction Drain Left;Right Abdomen Bulb 19 Fr  less than 1 day                  Physical Exam    General Appearance:    Alert, cooperative, no distress, appears stated age   Head:    Normocephalic, without obvious abnormality, atraumatic   Lungs:     Clear to auscultation bilaterally, respirations unlabored   Heart:    Regular rate and rhythm, S1 and S2 normal, no murmur, rub    or gallop   Abdomen:     Soft, appropriate tenderness, bowel sounds active all four quadrants, no masses, no organomegaly, non distended, incisions clean, dry, and intact, MADONNA Drain serosanguinous   Extremities:   Extremities normal, atraumatic, no cyanosis or edema   Neurologic:   CNII-XII intact  Normal strength and sensation               Lab, Imaging and other studies:  I have personally reviewed pertinent lab results    , CBC:   Lab Results   Component Value Date    WBC 6 91 01/25/2018    HGB 10 8 (L) 01/25/2018    HCT 35 2 01/25/2018    MCV 93 01/25/2018     01/25/2018    MCH 28 6 01/25/2018    MCHC 30 7 (L) 01/25/2018    RDW 21 4 (H) 01/25/2018    MPV 9 5 01/25/2018   , CMP:   Lab Results   Component Value Date     01/25/2018    K 3 8 01/25/2018     (H) 01/25/2018    CO2 25 01/25/2018    ANIONGAP 9 01/25/2018    BUN 8 01/25/2018    CREATININE 0 71 01/25/2018    GLUCOSE 114 01/25/2018    CALCIUM 7 8 (L) 01/25/2018    EGFR 102 01/25/2018        VTE Mechanical Prophylaxis: sequential compression device  Assessment/Plan  #1  Morbid Obesity s/p Procedure(s):  LAPAROSCOPIC REVISION OF REINALDO-EN-Y GASTRIC BYPASS ROBOTICALLY ASSISTED  INTRAOPERATIVE ESOPHAGOGASTRODUODENOSCOPY (EGD) POD #1   Will check UGI but continue NPO status given the complexity of the case  If doing well tomorrow, anticipate advancing to clears  #2  Sleep Apena: CPAP qHS if used, otherwise capnography monitoring  #3  MDD: resume home meds when tolerating po  #4 Iron deficiency anemia: stable, cont supplements on discharge  Plan of care was discussed with patient's nurse    Dispo: Continue npo, ambulation, incentive spirometry  This text is generated with voice recognition software  There may be translation, syntax,  or grammatical errors  If you have any questions, please contact the dictating provider

## 2018-01-26 LAB
BASOPHILS # BLD AUTO: 0.02 THOUSANDS/ΜL (ref 0–0.1)
BASOPHILS NFR BLD AUTO: 0 % (ref 0–1)
EOSINOPHIL # BLD AUTO: 0.07 THOUSAND/ΜL (ref 0–0.61)
EOSINOPHIL NFR BLD AUTO: 1 % (ref 0–6)
ERYTHROCYTE [DISTWIDTH] IN BLOOD BY AUTOMATED COUNT: 21.3 % (ref 11.6–15.1)
HCT VFR BLD AUTO: 32 % (ref 34.8–46.1)
HGB BLD-MCNC: 9.6 G/DL (ref 11.5–15.4)
LYMPHOCYTES # BLD AUTO: 1.54 THOUSANDS/ΜL (ref 0.6–4.47)
LYMPHOCYTES NFR BLD AUTO: 25 % (ref 14–44)
MCH RBC QN AUTO: 28.7 PG (ref 26.8–34.3)
MCHC RBC AUTO-ENTMCNC: 30 G/DL (ref 31.4–37.4)
MCV RBC AUTO: 96 FL (ref 82–98)
MONOCYTES # BLD AUTO: 0.38 THOUSAND/ΜL (ref 0.17–1.22)
MONOCYTES NFR BLD AUTO: 6 % (ref 4–12)
NEUTROPHILS # BLD AUTO: 4.26 THOUSANDS/ΜL (ref 1.85–7.62)
NEUTS SEG NFR BLD AUTO: 68 % (ref 43–75)
PLATELET # BLD AUTO: 187 THOUSANDS/UL (ref 149–390)
PMV BLD AUTO: 9.6 FL (ref 8.9–12.7)
RBC # BLD AUTO: 3.35 MILLION/UL (ref 3.81–5.12)
WBC # BLD AUTO: 6.27 THOUSAND/UL (ref 4.31–10.16)

## 2018-01-26 PROCEDURE — 85025 COMPLETE CBC W/AUTO DIFF WBC: CPT | Performed by: PHYSICIAN ASSISTANT

## 2018-01-26 PROCEDURE — C9113 INJ PANTOPRAZOLE SODIUM, VIA: HCPCS | Performed by: SURGERY

## 2018-01-26 RX ORDER — HYDROCODONE BITARTRATE AND ACETAMINOPHEN 5; 325 MG/1; MG/1
1 TABLET ORAL EVERY 4 HOURS PRN
Status: DISCONTINUED | OUTPATIENT
Start: 2018-01-26 | End: 2018-01-29 | Stop reason: HOSPADM

## 2018-01-26 RX ORDER — HYDROCODONE BITARTRATE AND ACETAMINOPHEN 5; 325 MG/1; MG/1
2 TABLET ORAL EVERY 4 HOURS PRN
Status: DISCONTINUED | OUTPATIENT
Start: 2018-01-26 | End: 2018-01-29 | Stop reason: HOSPADM

## 2018-01-26 RX ADMIN — METOCLOPRAMIDE 10 MG: 5 INJECTION, SOLUTION INTRAMUSCULAR; INTRAVENOUS at 23:46

## 2018-01-26 RX ADMIN — IRON SUCROSE 200 MG: 20 INJECTION, SOLUTION INTRAVENOUS at 10:21

## 2018-01-26 RX ADMIN — MORPHINE SULFATE 2 MG: 2 INJECTION, SOLUTION INTRAMUSCULAR; INTRAVENOUS at 17:28

## 2018-01-26 RX ADMIN — METOCLOPRAMIDE 10 MG: 5 INJECTION, SOLUTION INTRAMUSCULAR; INTRAVENOUS at 17:24

## 2018-01-26 RX ADMIN — METOCLOPRAMIDE 10 MG: 5 INJECTION, SOLUTION INTRAMUSCULAR; INTRAVENOUS at 11:48

## 2018-01-26 RX ADMIN — METOCLOPRAMIDE 10 MG: 5 INJECTION, SOLUTION INTRAMUSCULAR; INTRAVENOUS at 05:22

## 2018-01-26 RX ADMIN — POTASSIUM CHLORIDE: 2 INJECTION, SOLUTION, CONCENTRATE INTRAVENOUS at 11:48

## 2018-01-26 RX ADMIN — SODIUM CHLORIDE, SODIUM LACTATE, POTASSIUM CHLORIDE, AND CALCIUM CHLORIDE 125 ML/HR: .6; .31; .03; .02 INJECTION, SOLUTION INTRAVENOUS at 07:58

## 2018-01-26 RX ADMIN — PANTOPRAZOLE SODIUM 40 MG: 40 INJECTION, POWDER, FOR SOLUTION INTRAVENOUS at 09:27

## 2018-01-26 NOTE — POST OP PROGRESS NOTES
Progress Note - General Surgery   Cal Garcia 56 y o  female MRN: [id number]  Unit/Bed#: E5 -01 Encounter: [id number]      Subjective/Objective     Subjective: Patient with morbid obesity, chronic perforated marginal ulcer s/p Procedure(s):  LAPAROSCOPIC REVISION OF REINALDO-EN-Y GASTRIC BYPASS ROBOTICALLY ASSISTED  INTRAOPERATIVE ESOPHAGOGASTRODUODENOSCOPY (EGD) POD #2   Still NPO  Denies nausea or vomiting, pain controlled on pain medication, ambulating without assistance, voiding well, using incentive spirometer  Objective:    /64 (BP Location: Right arm)   Pulse 105   Temp (!) 96 6 °F (35 9 °C) (Tympanic)   Resp 18   Ht 5' 5 75" (1 67 m)   Wt 105 kg (231 lb 7 oz)   LMP  (LMP Unknown)   SpO2 94%   BMI 37 93 kg/m²       Intake/Output Summary (Last 24 hours) at 01/26/18 5215  Last data filed at 01/25/18 2326   Gross per 24 hour   Intake          3927 08 ml   Output              160 ml   Net          3767 08 ml       Invasive Devices     Peripheral Intravenous Line            Peripheral IV 01/24/18 Right Hand 2 days    Peripheral IV 01/24/18 Left Wrist 1 day          Drain            Closed/Suction Drain Left;Right Abdomen Bulb 19 Fr  - 60 cc 1 day                  Physical Exam    General Appearance:    Alert, cooperative, no distress, appears stated age   Head:    Normocephalic, without obvious abnormality, atraumatic   Lungs:     Clear to auscultation bilaterally, respirations unlabored   Heart:    Regular rate and rhythm, S1 and S2 normal, no murmur, rub    or gallop   Abdomen:     Soft, appropriate tenderness, bowel sounds active all four quadrants, no masses, no organomegaly, non distended, incisions clean, dry, and intact, MADONNA serosanguinous,   Extremities:   Extremities normal, atraumatic, no cyanosis or edema   Neurologic:   CNII-XII intact  Normal strength and sensation               Lab, Imaging and other studies:  I have personally reviewed pertinent lab results    , CBC:   Lab Results   Component Value Date    WBC 6 27 01/26/2018    HGB 9 6 (L) 01/26/2018    HCT 32 0 (L) 01/26/2018    MCV 96 01/26/2018     01/26/2018    MCH 28 7 01/26/2018    MCHC 30 0 (L) 01/26/2018    RDW 21 3 (H) 01/26/2018    MPV 9 6 01/26/2018       VTE Mechanical Prophylaxis: sequential compression device    Assessment/Plan  #1  Morbid Obesity s/p Procedure(s):  LAPAROSCOPIC REVISION OF REINALDO-EN-Y GASTRIC BYPASS ROBOTICALLY ASSISTED  INTRAOPERATIVE ESOPHAGOGASTRODUODENOSCOPY (EGD) POD #2  Doing well, however, due to difficult nature of her surgery, continued NPO is recommended  Likely start clears on Monday as per Dr Sandie Rivera  Transition to po pain meds once tolerating po  Will add daily banana bag  #2  Bariatric surgery status: s/p RYGB with weight regain  Suspected chronic marginal ulcer perforation and 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, now revised, as per #1  #3  Sleep Apena: CPAP qHS if used, otherwise capnography monitoring  #4  MDD: resume home meds when tolerating po  #5 Acute on chronic iron deficiency anemia: Modest drop in hemoglobin from yesterday, however pt continues on IVF, likely dilutional component  Hemodynamically stable, cont supplements on discharge       Plan of care was discussed with patient's nurse    Dispo: NPO, ambulation, incentive spirometry  This text is generated with voice recognition software  There may be translation, syntax,  or grammatical errors  If you have any questions, please contact the dictating provider

## 2018-01-27 LAB
ANION GAP SERPL CALCULATED.3IONS-SCNC: 13 MMOL/L (ref 4–13)
BASOPHILS # BLD AUTO: 0.01 THOUSANDS/ΜL (ref 0–0.1)
BASOPHILS NFR BLD AUTO: 0 % (ref 0–1)
BUN SERPL-MCNC: 6 MG/DL (ref 5–25)
CALCIUM SERPL-MCNC: 8.1 MG/DL (ref 8.3–10.1)
CHLORIDE SERPL-SCNC: 105 MMOL/L (ref 100–108)
CO2 SERPL-SCNC: 25 MMOL/L (ref 21–32)
CREAT SERPL-MCNC: 0.57 MG/DL (ref 0.6–1.3)
EOSINOPHIL # BLD AUTO: 0.2 THOUSAND/ΜL (ref 0–0.61)
EOSINOPHIL NFR BLD AUTO: 3 % (ref 0–6)
ERYTHROCYTE [DISTWIDTH] IN BLOOD BY AUTOMATED COUNT: 20.2 % (ref 11.6–15.1)
GFR SERPL CREATININE-BSD FRML MDRD: 112 ML/MIN/1.73SQ M
GLUCOSE SERPL-MCNC: 68 MG/DL (ref 65–140)
HCT VFR BLD AUTO: 32.1 % (ref 34.8–46.1)
HGB BLD-MCNC: 9.9 G/DL (ref 11.5–15.4)
LYMPHOCYTES # BLD AUTO: 1.79 THOUSANDS/ΜL (ref 0.6–4.47)
LYMPHOCYTES NFR BLD AUTO: 31 % (ref 14–44)
MAGNESIUM SERPL-MCNC: 1.9 MG/DL (ref 1.6–2.6)
MCH RBC QN AUTO: 28.4 PG (ref 26.8–34.3)
MCHC RBC AUTO-ENTMCNC: 30.8 G/DL (ref 31.4–37.4)
MCV RBC AUTO: 92 FL (ref 82–98)
MONOCYTES # BLD AUTO: 0.28 THOUSAND/ΜL (ref 0.17–1.22)
MONOCYTES NFR BLD AUTO: 5 % (ref 4–12)
NEUTROPHILS # BLD AUTO: 3.56 THOUSANDS/ΜL (ref 1.85–7.62)
NEUTS SEG NFR BLD AUTO: 61 % (ref 43–75)
NRBC BLD AUTO-RTO: 0 /100 WBCS
PLATELET # BLD AUTO: 160 THOUSANDS/UL (ref 149–390)
PMV BLD AUTO: 9.5 FL (ref 8.9–12.7)
POTASSIUM SERPL-SCNC: 3.5 MMOL/L (ref 3.5–5.3)
RBC # BLD AUTO: 3.48 MILLION/UL (ref 3.81–5.12)
SODIUM SERPL-SCNC: 143 MMOL/L (ref 136–145)
WBC # BLD AUTO: 5.84 THOUSAND/UL (ref 4.31–10.16)

## 2018-01-27 PROCEDURE — C9113 INJ PANTOPRAZOLE SODIUM, VIA: HCPCS | Performed by: SURGERY

## 2018-01-27 PROCEDURE — 85025 COMPLETE CBC W/AUTO DIFF WBC: CPT | Performed by: PHYSICIAN ASSISTANT

## 2018-01-27 PROCEDURE — 99024 POSTOP FOLLOW-UP VISIT: CPT | Performed by: SURGERY

## 2018-01-27 PROCEDURE — 83735 ASSAY OF MAGNESIUM: CPT | Performed by: PHYSICIAN ASSISTANT

## 2018-01-27 PROCEDURE — 80048 BASIC METABOLIC PNL TOTAL CA: CPT | Performed by: PHYSICIAN ASSISTANT

## 2018-01-27 RX ADMIN — METOCLOPRAMIDE 10 MG: 5 INJECTION, SOLUTION INTRAMUSCULAR; INTRAVENOUS at 12:20

## 2018-01-27 RX ADMIN — SODIUM CHLORIDE, SODIUM LACTATE, POTASSIUM CHLORIDE, AND CALCIUM CHLORIDE 100 ML/HR: .6; .31; .03; .02 INJECTION, SOLUTION INTRAVENOUS at 22:53

## 2018-01-27 RX ADMIN — POTASSIUM CHLORIDE: 2 INJECTION, SOLUTION, CONCENTRATE INTRAVENOUS at 09:53

## 2018-01-27 RX ADMIN — SODIUM CHLORIDE, SODIUM LACTATE, POTASSIUM CHLORIDE, AND CALCIUM CHLORIDE 100 ML/HR: .6; .31; .03; .02 INJECTION, SOLUTION INTRAVENOUS at 04:42

## 2018-01-27 RX ADMIN — MORPHINE SULFATE 2 MG: 2 INJECTION, SOLUTION INTRAMUSCULAR; INTRAVENOUS at 04:39

## 2018-01-27 RX ADMIN — PANTOPRAZOLE SODIUM 40 MG: 40 INJECTION, POWDER, FOR SOLUTION INTRAVENOUS at 09:34

## 2018-01-27 RX ADMIN — MORPHINE SULFATE 2 MG: 2 INJECTION, SOLUTION INTRAMUSCULAR; INTRAVENOUS at 16:58

## 2018-01-27 RX ADMIN — METOCLOPRAMIDE 10 MG: 5 INJECTION, SOLUTION INTRAMUSCULAR; INTRAVENOUS at 17:00

## 2018-01-27 RX ADMIN — MORPHINE SULFATE 4 MG: 4 INJECTION, SOLUTION INTRAMUSCULAR; INTRAVENOUS at 09:34

## 2018-01-27 RX ADMIN — METOCLOPRAMIDE 10 MG: 5 INJECTION, SOLUTION INTRAMUSCULAR; INTRAVENOUS at 06:06

## 2018-01-27 NOTE — PROGRESS NOTES
Progress Note - Bariatric Surgery   Enzo Saravia 56 y o  female MRN: [id number]  Unit/Bed#: E5 -01 Encounter: [id number]            Subjective: feels very good    Objective:     N[place] REVISE,GASTROJEJUN ANAST,W/O VAGOTOMY [63388] (LAPAROSCOPIC REVISION OF REINALDO-EN-Y GASTRIC BYPASS; INTRAOPERATIVE EGD)    Lab, Imaging and other studies: I have personally reviewed pertinent labs  VTE Pharmacologic Prophylaxis: Sequential compression device (Venodyne)   VTE Mechanical Prophylaxis: sequential compression device    Vitals: Blood pressure 132/61, pulse 87, temperature 99 3 °F (37 4 °C), temperature source Temporal, resp  rate 18, height 5' 5 75" (1 67 m), weight 105 kg (231 lb 7 oz), SpO2 97 %, not currently breastfeeding  ,Body mass index is 37 93 kg/m²        Intake/Output Summary (Last 24 hours) at 01/27/18 1023  Last data filed at 01/27/18 0516   Gross per 24 hour   Intake             3175 ml   Output               50 ml   Net             3125 ml       Invasive Devices     Peripheral Intravenous Line            Peripheral IV 01/26/18 Left Arm less than 1 day          Drain            Closed/Suction Drain Left;Right Abdomen Bulb 19 Fr  2 days                Physical Exam: Abdomen soft non tender non distended    Assessment:  S/p laparoscopic revision    Plan:  IVF  Keep NPO  IV iron infusion  Check BMI in Am                Mansoor Moody MD

## 2018-01-28 LAB
ANION GAP SERPL CALCULATED.3IONS-SCNC: 14 MMOL/L (ref 4–13)
BUN SERPL-MCNC: 7 MG/DL (ref 5–25)
CALCIUM SERPL-MCNC: 8.5 MG/DL (ref 8.3–10.1)
CHLORIDE SERPL-SCNC: 104 MMOL/L (ref 100–108)
CO2 SERPL-SCNC: 21 MMOL/L (ref 21–32)
CREAT SERPL-MCNC: 0.64 MG/DL (ref 0.6–1.3)
GFR SERPL CREATININE-BSD FRML MDRD: 107 ML/MIN/1.73SQ M
GLUCOSE SERPL-MCNC: 59 MG/DL (ref 65–140)
POTASSIUM SERPL-SCNC: 4 MMOL/L (ref 3.5–5.3)
SODIUM SERPL-SCNC: 139 MMOL/L (ref 136–145)

## 2018-01-28 PROCEDURE — 99024 POSTOP FOLLOW-UP VISIT: CPT | Performed by: SURGERY

## 2018-01-28 PROCEDURE — C9113 INJ PANTOPRAZOLE SODIUM, VIA: HCPCS | Performed by: SURGERY

## 2018-01-28 PROCEDURE — 80048 BASIC METABOLIC PNL TOTAL CA: CPT | Performed by: SURGERY

## 2018-01-28 RX ADMIN — METOCLOPRAMIDE 10 MG: 5 INJECTION, SOLUTION INTRAMUSCULAR; INTRAVENOUS at 18:19

## 2018-01-28 RX ADMIN — MORPHINE SULFATE 2 MG: 2 INJECTION, SOLUTION INTRAMUSCULAR; INTRAVENOUS at 00:58

## 2018-01-28 RX ADMIN — MORPHINE SULFATE 2 MG: 2 INJECTION, SOLUTION INTRAMUSCULAR; INTRAVENOUS at 09:14

## 2018-01-28 RX ADMIN — POTASSIUM CHLORIDE: 2 INJECTION, SOLUTION, CONCENTRATE INTRAVENOUS at 09:42

## 2018-01-28 RX ADMIN — METOCLOPRAMIDE 10 MG: 5 INJECTION, SOLUTION INTRAMUSCULAR; INTRAVENOUS at 12:50

## 2018-01-28 RX ADMIN — PANTOPRAZOLE SODIUM 40 MG: 40 INJECTION, POWDER, FOR SOLUTION INTRAVENOUS at 09:14

## 2018-01-28 RX ADMIN — MORPHINE SULFATE 2 MG: 2 INJECTION, SOLUTION INTRAMUSCULAR; INTRAVENOUS at 18:19

## 2018-01-28 RX ADMIN — METOCLOPRAMIDE 10 MG: 5 INJECTION, SOLUTION INTRAMUSCULAR; INTRAVENOUS at 00:45

## 2018-01-28 RX ADMIN — SODIUM CHLORIDE, SODIUM LACTATE, POTASSIUM CHLORIDE, AND CALCIUM CHLORIDE 100 ML/HR: .6; .31; .03; .02 INJECTION, SOLUTION INTRAVENOUS at 19:44

## 2018-01-28 RX ADMIN — METOCLOPRAMIDE 10 MG: 5 INJECTION, SOLUTION INTRAMUSCULAR; INTRAVENOUS at 23:38

## 2018-01-28 RX ADMIN — METOCLOPRAMIDE 10 MG: 5 INJECTION, SOLUTION INTRAMUSCULAR; INTRAVENOUS at 05:49

## 2018-01-28 NOTE — PROGRESS NOTES
Progress Note - Bariatric Surgery   Nicholas Begun 56 y o  female MRN: [id number]  Unit/Bed#: E5 -01 Encounter: [id number]            Subjective: feels very well    Objective:     [place] REVISE,GASTROJEJUN ANAST,W/O VAGOTOMY [84961] (LAPAROSCOPIC REVISION OF REINALDO-EN-Y GASTRIC BYPASS; INTRAOPERATIVE EGD)    Lab, Imaging and other studies: I have personally reviewed pertinent labs  VTE Pharmacologic Prophylaxis: Sequential compression device (Venodyne)   VTE Mechanical Prophylaxis: sequential compression device    Vitals: Blood pressure 110/60, pulse 86, temperature 98 6 °F (37 °C), temperature source Temporal, resp  rate 20, height 5' 5 75" (1 67 m), weight 105 kg (231 lb 7 oz), SpO2 99 %, not currently breastfeeding  ,Body mass index is 37 93 kg/m²        Intake/Output Summary (Last 24 hours) at 01/28/18 1013  Last data filed at 01/27/18 2253   Gross per 24 hour   Intake          1818 33 ml   Output               25 ml   Net          1793 33 ml       Invasive Devices     Peripheral Intravenous Line            Peripheral IV 01/26/18 Left Arm 1 day          Drain            Closed/Suction Drain Left;Right Abdomen Bulb 19 Fr  3 days                Physical Exam: Abdomen soft non tender non distended  MADONNA in place    Assessment:  S/p laparoscopic RYGB revision      Plan:  IVF  Diet in Am  Discharge planning                Dai Hannah MD

## 2018-01-29 VITALS
WEIGHT: 231.44 LBS | DIASTOLIC BLOOD PRESSURE: 69 MMHG | TEMPERATURE: 98.1 F | HEART RATE: 91 BPM | BODY MASS INDEX: 37.2 KG/M2 | HEIGHT: 66 IN | RESPIRATION RATE: 18 BRPM | OXYGEN SATURATION: 100 % | SYSTOLIC BLOOD PRESSURE: 136 MMHG

## 2018-01-29 PROCEDURE — C9113 INJ PANTOPRAZOLE SODIUM, VIA: HCPCS | Performed by: SURGERY

## 2018-01-29 PROCEDURE — 99024 POSTOP FOLLOW-UP VISIT: CPT | Performed by: PHYSICIAN ASSISTANT

## 2018-01-29 RX ORDER — VENLAFAXINE 75 MG/1
75 TABLET ORAL 2 TIMES DAILY
Refills: 0 | Status: ON HOLD
Start: 2018-01-29 | End: 2018-02-14

## 2018-01-29 RX ORDER — BUPROPION HYDROCHLORIDE 75 MG/1
75 TABLET ORAL 2 TIMES DAILY
Refills: 0
Start: 2018-01-29 | End: 2018-02-02 | Stop reason: ALTCHOICE

## 2018-01-29 RX ORDER — HYDROXYZINE PAMOATE 50 MG/1
50 CAPSULE ORAL 3 TIMES DAILY
COMMUNITY
End: 2018-02-02 | Stop reason: ALTCHOICE

## 2018-01-29 RX ORDER — SODIUM FLUORIDE 6 MG/ML
PASTE, DENTIFRICE DENTAL
Refills: 5 | COMMUNITY
Start: 2017-11-08

## 2018-01-29 RX ORDER — HYDROCODONE BITARTRATE AND ACETAMINOPHEN 5; 325 MG/1; MG/1
1 TABLET ORAL EVERY 4 HOURS PRN
Refills: 0
Start: 2018-01-29 | End: 2018-02-02 | Stop reason: ALTCHOICE

## 2018-01-29 RX ADMIN — METOCLOPRAMIDE 10 MG: 5 INJECTION, SOLUTION INTRAMUSCULAR; INTRAVENOUS at 05:01

## 2018-01-29 RX ADMIN — PANTOPRAZOLE SODIUM 40 MG: 40 INJECTION, POWDER, FOR SOLUTION INTRAVENOUS at 09:43

## 2018-01-29 RX ADMIN — MORPHINE SULFATE 2 MG: 2 INJECTION, SOLUTION INTRAMUSCULAR; INTRAVENOUS at 03:35

## 2018-01-29 RX ADMIN — MORPHINE SULFATE 2 MG: 2 INJECTION, SOLUTION INTRAMUSCULAR; INTRAVENOUS at 09:42

## 2018-01-29 RX ADMIN — SODIUM CHLORIDE, SODIUM LACTATE, POTASSIUM CHLORIDE, AND CALCIUM CHLORIDE 100 ML/HR: .6; .31; .03; .02 INJECTION, SOLUTION INTRAVENOUS at 04:59

## 2018-01-29 NOTE — DISCHARGE INSTRUCTIONS
Bariatric/Weight Loss Surgery  Hospital Discharge Instructions  1  ACTIVITY:  a  Progress as feels comfortable - a good rule is:  if you are doing something and it begins to hurt, stop doing the activity  Walk at least 3 times per day at home  b  Karyn Downing may walk stairs if you do so slowly  c  You may shower 48 hours after surgery  d  Use your incentive spirometer 10 times per hour while awake for 1 week  e  No driving if you are still taking certain prescription pain medication  Examples of such medication are Percocet, Darvocet, Oxycodone, Tylenol #3, and Tylenol with Codeine  Follow your pharmacists orders  2  DIET  a  Stay on a liquid diet for 7 days after your surgery date, sipping slowly  Refer to your manual for examples of choices  Remember to keep your liquids sugar free or low calorie  You may have protein drinks  Make sure to drink 48 to 64 ounces per day of fluids  b  On the 8th day after surgery, start a pureed/blenderized diet  (As an example, if you had surgery on a Monday, you can start your pureed/blenderized diet the following Monday)  Start 3 meals per day, breakfast, lunch and dinner, each meal to consist of 30 minutes without drinking, 20 minutes eating food and then another 60 minutes without drinking  Follow the directions in your manual under Diet Progression, section 3  You will be on a pureed/blenderized diet for 3 to 5 days  c  Once you start the pureed/blenderized diet, remember to keep hydrated by drinking water or low calorie liquids between meal times (48 to 64 ounces per day) following the 30/60 minute rule  d  Karyn Downing may start a soft diet once you get approval from your surgeon at your first follow up visit  3  MEDICATIONS:  a  Start vitamins and minerals when you get home  b  Pain and Anti-acid Medication as per prescription  c  Other medications as indicated on the Physician Patient Discharge Instructions form given to you at the time of discharge    d  Make sure that you are splitting your pill or tablet medications in halves or fourths or even crushing them before you take them  Capsules should be opened and mixed with water or jello  You need to do this for at least 2 to 4 weeks after surgery  Eventually you will be able to take your medications the regular way as they were prescribed  Ask your nurse prior to discharge about your medications  e  Aurelia Gastelum will need to consult with your Family Doctor in regards to all your prescribed medication, particularly those for blood pressure and diabetes  As you lose weight, medical conditions may change, requiring an alteration or elimination of the drug dose  4  INCISION CARE  a  You may shower and get incisions wet 2 days after surgery  b  If you have a drain, empty the drain as the nurses instructed  5  FOLLOW-UP APPOINTMENT should be made for one week after discharge  Call surgeons office at [phone] to schedule an appointment      6  CALL YOUR DOCTOR FOR:  pain not controlled by pain medications, a temperature greater than 101 5° F, any increase or change in drainage or redness from any incision, any vomiting or inability to keep liquids down, shortness of breath, shoulder pain, or bleeding

## 2018-01-29 NOTE — POST OP PROGRESS NOTES
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 now revised as per #1  4  Sleep apnea - continue with cpap at night  5  MDD - resume home meds, discussed her medications were recently changed to no longer be extended release  6  acute on chronic iron defiency anemia: stable      Subjective/Objective   Subjective:  Reports she feels good  Shyrl Bonnet to go home and to eat  Denies nausea or vomiting  Abdominal pain well controlled  Ambulating without difficulty    Objective:     Blood pressure 114/54, pulse 81, temperature 98 °F (36 7 °C), temperature source Temporal, resp  rate 18, height 5' 5 75" (1 67 m), weight 105 kg (231 lb 7 oz), SpO2 94 %, not currently breastfeeding  ,Body mass index is 37 93 kg/m²        Intake/Output Summary (Last 24 hours) at 01/29/18 0829  Last data filed at 01/29/18 0459   Gross per 24 hour   Intake             3010 ml   Output                0 ml   Net             3010 ml       Invasive Devices     Peripheral Intravenous Line            Peripheral IV 01/29/18 Right Antecubital less than 1 day          Drain            Closed/Suction Drain Left;Right Abdomen Bulb 19 Fr  4 days                Physical Exam: General appearance: alert and oriented, in no acute distress  Eyes: PERRL, EOMI  Lungs: clear to auscultation bilaterally and without wheezes, rales, or rhonchi  Heart: regular rate and rhythm, S1, S2 normal, no murmur, click, rub or gallop  Abdomen: soft, non-tender; bowel sounds normal; no masses,  no organomegaly  Extremities: extremities normal, warm and well-perfused; no cyanosis, clubbing, or edema    Lab, Imaging and other studies:  CBC: No results found for: WBC, HGB, HCT, MCV, PLT, ADJUSTEDWBC, MCH, MCHC, RDW, MPV, NRBC, CMP:   Lab Results   Component Value Date     01/28/2018    K 4 0 01/28/2018     01/28/2018    CO2 21 01/28/2018    ANIONGAP 14 (H) 01/28/2018    BUN 7 01/28/2018    CREATININE 0 64 01/28/2018    GLUCOSE 59 (L) 01/28/2018    CALCIUM 8 5 01/28/2018    EGFR 107 01/28/2018     VTE Mechanical Prophylaxis: sequential compression device

## 2018-01-29 NOTE — NURSING NOTE
Patient was given discharge instructions, drain gauzes and collection containers to record output and care for properly  No questions

## 2018-01-29 NOTE — DISCHARGE SUMMARY
Discharge Summary - Edita Shah 56 y o  female MRN: [id number]    Unit/Bed#: E5 -01 Encounter: [id number]    Admission Date: 1/24/2018     Discharge Date: 01/29/18    Admitting Diagnosis: Morbid (severe) obesity due to excess calories ([facility] ) [E66 01]  Nausea [R11 0]  Obstructive sleep apnea [G47 33]  Polycystic ovarian syndrome [E28 2]  Bariatric surgery status [Z98 84]    Secondary Diagnosis:   Past Medical History:   Diagnosis Date    Anemia     iron deicient    Anxiety     panic disorder    Bipolar disorder ([facility] )     Dental cavity     lower left back removed on 1/5/18 and sutures present to be removed 1/12     Depression     GERD (gastroesophageal reflux disease)     History of bariatric surgery     RUEN-Y  10/2006  wt loss 166lb w/ gain now    Hypoglycemia     Morbid obesity (HCC)     Nausea     PCOS (polycystic ovarian syndrome)     Shortness of breath     going up stairs    Sleep apnea     "doesn't use machine"    Stomach ulcer     Tooth loose     upper front       Discharge Diagnosis: Same    Procedures Performed: Procedure(s):  LAPAROSCOPIC REVISION OF REINALDO-EN-Y GASTRIC BYPASS ROBOTICALLY ASSISTED  INTRAOPERATIVE ESOPHAGOGASTRODUODENOSCOPY (EGD)    Consults: none    Hospital Course: Patient with morbid obesity was admitted to the hospital on 1/24/2018 with status post RYGB with recent weight regain suspected chronic marginal ulcer with perforation and gastrogastric fistula  She presents for elective LAPAROSCOPIC REVISION OF REINALDO-EN-Y GASTRIC BYPASS ROBOTICALLY ASSISTED INTRAOPERATIVE ESOPHAGOGASTRODUODENOSCOPY (EGD)  Postoperatively, the patient was stable and transferred to the [address] for further observation  Over the next several days the patient was left NPO to allow full healing of her anastomosis and on Post operative day # 5 she was started on a clear liquid diet   She was able to tolerate clear liquids and once she was tolerating oral pain medications and ambulating without assistance, vital signs and lab work were stable she was discharged home on post operative day #5  The patient was cleared for discharge on 01/29/18  Disposition: The patient should follow up with Jose Hilton MD in on Friday November 2 for a post op check and drain removal and with Khurram Villa MD as needed for medical management  The patient should refer to the handout "Discharge Instructions" for further information  Call physician for temperature over 101, wound redness or discharge, vomiting, or intolerance to diet  Discharge Medications:  See after visit summary for reconciled discharge medications provided to patient and family  This text is generated with voice recognition software  There may be translation, syntax,  or grammatical errors  If you have any questions, please contact the dictating provider

## 2018-01-30 ENCOUNTER — TELEPHONE (OUTPATIENT)
Dept: BARIATRICS | Facility: CLINIC | Age: 48
End: 2018-01-30

## 2018-01-31 ENCOUNTER — TELEPHONE (OUTPATIENT)
Dept: BARIATRICS | Facility: CLINIC | Age: 48
End: 2018-01-31

## 2018-01-31 NOTE — TELEPHONE ENCOUNTER
Post op follow up phone call completed  Pt is sipping liquids, using IS as instructed, reinforced importance of using IS to help prevent pneumonia  Ambulating about home without difficulty  Minimal pain, not using Percocet  Reaffirmed examples of clear liquid diet over the next week  Pt stated understanding about discharge instructions and medication adjustments  Follow up appt with surgeon scheduled for Friday  Instructed to call with any additional questions or concerns

## 2018-02-02 ENCOUNTER — DOCUMENTATION (OUTPATIENT)
Dept: BARIATRICS | Facility: CLINIC | Age: 48
End: 2018-02-02

## 2018-02-02 ENCOUNTER — OFFICE VISIT (OUTPATIENT)
Dept: BARIATRICS | Facility: CLINIC | Age: 48
End: 2018-02-02

## 2018-02-02 VITALS
BODY MASS INDEX: 36 KG/M2 | WEIGHT: 224 LBS | HEART RATE: 99 BPM | TEMPERATURE: 98.1 F | SYSTOLIC BLOOD PRESSURE: 110 MMHG | DIASTOLIC BLOOD PRESSURE: 80 MMHG | HEIGHT: 66 IN

## 2018-02-02 DIAGNOSIS — K21.9 GASTROESOPHAGEAL REFLUX DISEASE, ESOPHAGITIS PRESENCE NOT SPECIFIED: ICD-10-CM

## 2018-02-02 DIAGNOSIS — E11.69 DIABETES MELLITUS TYPE 2 IN OBESE (HCC): ICD-10-CM

## 2018-02-02 DIAGNOSIS — E66.01 MORBID (SEVERE) OBESITY DUE TO EXCESS CALORIES (HCC): Primary | ICD-10-CM

## 2018-02-02 DIAGNOSIS — E66.9 DIABETES MELLITUS TYPE 2 IN OBESE (HCC): ICD-10-CM

## 2018-02-02 PROCEDURE — 99024 POSTOP FOLLOW-UP VISIT: CPT | Performed by: SURGERY

## 2018-02-02 RX ORDER — VENLAFAXINE 100 MG/1
TABLET ORAL
Refills: 0 | COMMUNITY
Start: 2018-01-23 | End: 2019-12-30

## 2018-02-02 RX ORDER — BUPROPION HYDROCHLORIDE 100 MG/1
TABLET ORAL
Refills: 0 | COMMUNITY
Start: 2018-01-22 | End: 2021-01-27

## 2018-02-02 RX ORDER — OMEPRAZOLE 20 MG/1
CAPSULE, DELAYED RELEASE ORAL
Status: ON HOLD | COMMUNITY
Start: 2017-12-18 | End: 2018-02-14

## 2018-02-02 NOTE — PROGRESS NOTES
FIRST POST-OPERATIVE VISIT - BARIATRIC SURGERY  Niko Ramirez 56 y o  female MRN: [id number]  Unit/Bed#:  Encounter: [id number]      HPI:  Niko Ramirez is a 56 y o  female who presents for the 1st postoperative visit following a laparoscopic Claudette-en-Y gastric bypass Revision (partial gastrectomy and redo GJ)    Review of Systems    Historical Information   Past Medical History:   Diagnosis Date    Anemia     iron deicient    Anxiety     panic disorder    [facility] disorder ([facility] [address] )     Dental cavity     lower left back removed on 1/5/18 and sutures present to be removed 1/12     Depression     GERD (gastroesophageal reflux disease)     History of bariatric surgery     RUEN-Y  10/2006  wt loss 166lb w/ gain now    Hypoglycemia     Morbid obesity ([facility] [address] )     Nausea     PCOS (polycystic ovarian syndrome)     Shortness of breath     going up stairs    Sleep apnea     "doesn't use machine"    Stomach ulcer     Tooth loose     upper front     Past Surgical History:   Procedure Laterality Date    BARIATRIC SURGERY      approx 11 yrs ago    CHOLECYSTECTOMY      ESOPHAGOGASTRODUODENOSCOPY N/A 1/24/2018    Procedure: INTRAOPERATIVE ESOPHAGOGASTRODUODENOSCOPY (EGD); Surgeon: Tabitha Snider MD;  [facility];  Service: Bariatrics    MN EGD TRANSORAL BIOPSY SINGLE/MULTIPLE N/A 10/4/2017    Procedure: ESOPHAGOGASTRODUODENOSCOPY (EGD) with biopsy;  Surgeon: Tabitha Snider MD;  [facility];   Service: Bariatrics    REVISION BYPASS LAPAROSCOPIC N/A 1/24/2018    Procedure: LAPAROSCOPIC REVISION OF CLAUDETTE-EN-Y GASTRIC BYPASS ROBOTICALLY ASSISTED;  Surgeon: Tabitha Snider MD;  [facility];  Service: Bariatrics    TOTAL BODY LIFT      approx 2008    UPPER GASTROINTESTINAL ENDOSCOPY      WISDOM TOOTH EXTRACTION       Social History   History   Alcohol Use No     Comment: recovered alcoholic for 4 yrs     History   Drug Use No     History   Smoking Status    Former Smoker    Packs/day: 0 25    Years: 9 00    Types: Cigarettes    Quit date: 1993   Smokeless Tobacco    Former User     Comment: quit approx 20 yrs ago     Family History: non-contributory    Meds/Allergies   all medications and allergies reviewed  Allergies   Allergen Reactions    Percocet [Oxycodone-Acetaminophen] Shortness Of Breath     "Shallow breathing"    Latex Hives     Old gloves caused redness,        Objective   First Vitals:   @VSFIRST2(5,8,6,7,9,11,14,10:FIRST)@    Current Vitals:   Blood Pressure: 110/80 (02/02/18 1006)  Pulse: 99 (02/02/18 1006)  Temperature: 98 1 °F (36 7 °C) (02/02/18 1006)  Height: 5' 5 5" (166 4 cm) (02/02/18 1006)  Weight - Scale: 102 kg (224 lb) (02/02/18 1006)    [unfilled]    Invasive Devices     Drain            Closed/Suction Drain Left;Right Abdomen Bulb 19 Fr  8 days                Physical Exam  Abdomen soft NT/ND  Incisions clean dry and intact and MADONNA drain removed      Lab Results: I have personally reviewed pertinent lab results  Imaging: I have personally reviewed pertinent reports  EKG, Pathology, and Other Studies: I have personally reviewed pertinent reports  Code Status: [unfilled]  Advance Directive and Living Will:      Power of :    POLST:      Assessment/Plan :      Patient is presenting for the first postoperative visit, patient hospital stay was uneventful without any complications, patient is doing well, has no complaints, is taking vitamins as instructed, currently tolerating the blenderized diet, will advance to soft diet  Patient will also be meeting with our dietician today to review her vitamin and mineral supplements and also go over her diet and emphasize postoperative commitment and compliance  The patient was also instructed to start exercising on a regular basis  However, I recommended no heavy lifting, or weight exercises for another 2 weeks  F/U in 4 weeks  Patient was instructed to call if develops nausea, vomiting, fever or chills

## 2018-02-02 NOTE — PROGRESS NOTES
Weight Management Nutrition Class     Diagnosis: Morbid Obesity    Bariatric Surgeon: Dr Pasha Nelson    Surgery: revisonal surgery- of bypass    Class: first post op note    Topics discussed today include:     fluid goals post op, protein goals post op, chew food well, diet progression, protein supplems, vitamin/mineral supplements, calcium supplements and additional vitamin B12    Patient was able to verbalize basic diet (protein, fluid, vitamin and mineral) recommendations and possible nutrition-related complications   Yes

## 2018-02-02 NOTE — PROGRESS NOTES
Weight Management Nutrition Class     Diagnosis: Morbid Obesity    Bariatric Surgeon: Dr Roni Hyatt    Surgery: revision of gastric bypass due to ulcer    Class: first post op note    Topics discussed today include:     fluid goals post op, protein goals post op, chew food well, diet progression, protein supplems, vitamin/mineral supplements and calcium supplements    Patient was able to verbalize basic diet (protein, fluid, vitamin and mineral) recommendations and possible nutrition-related complications   Yes

## 2018-02-14 ENCOUNTER — HOSPITAL ENCOUNTER (OUTPATIENT)
Facility: HOSPITAL | Age: 48
Setting detail: OBSERVATION
Discharge: HOME | End: 2018-02-15
Attending: SURGERY | Admitting: SURGERY
Payer: COMMERCIAL

## 2018-02-14 ENCOUNTER — TELEPHONE (OUTPATIENT)
Dept: BARIATRICS | Facility: CLINIC | Age: 48
End: 2018-02-14

## 2018-02-14 DIAGNOSIS — R11.2 NON-INTRACTABLE VOMITING WITH NAUSEA, UNSPECIFIED VOMITING TYPE: Primary | ICD-10-CM

## 2018-02-14 LAB
ANION GAP SERPL CALCULATED.3IONS-SCNC: 14 MMOL/L (ref 4–13)
BASOPHILS # BLD AUTO: 0.01 THOUSANDS/ΜL (ref 0–0.1)
BASOPHILS NFR BLD AUTO: 0 % (ref 0–1)
BUN SERPL-MCNC: 16 MG/DL (ref 5–25)
CALCIUM SERPL-MCNC: 9.6 MG/DL (ref 8.3–10.1)
CHLORIDE SERPL-SCNC: 101 MMOL/L (ref 100–108)
CO2 SERPL-SCNC: 21 MMOL/L (ref 21–32)
CREAT SERPL-MCNC: 0.79 MG/DL (ref 0.6–1.3)
EOSINOPHIL # BLD AUTO: 0.04 THOUSAND/ΜL (ref 0–0.61)
EOSINOPHIL NFR BLD AUTO: 1 % (ref 0–6)
ERYTHROCYTE [DISTWIDTH] IN BLOOD BY AUTOMATED COUNT: 18.7 % (ref 11.6–15.1)
GFR SERPL CREATININE-BSD FRML MDRD: 90 ML/MIN/1.73SQ M
GLUCOSE SERPL-MCNC: 111 MG/DL (ref 65–140)
HCT VFR BLD AUTO: 48.2 % (ref 34.8–46.1)
HGB BLD-MCNC: 15.7 G/DL (ref 11.5–15.4)
LYMPHOCYTES # BLD AUTO: 1.01 THOUSANDS/ΜL (ref 0.6–4.47)
LYMPHOCYTES NFR BLD AUTO: 17 % (ref 14–44)
MCH RBC QN AUTO: 29.5 PG (ref 26.8–34.3)
MCHC RBC AUTO-ENTMCNC: 32.6 G/DL (ref 31.4–37.4)
MCV RBC AUTO: 91 FL (ref 82–98)
MONOCYTES # BLD AUTO: 0.5 THOUSAND/ΜL (ref 0.17–1.22)
MONOCYTES NFR BLD AUTO: 9 % (ref 4–12)
NEUTROPHILS # BLD AUTO: 4.29 THOUSANDS/ΜL (ref 1.85–7.62)
NEUTS SEG NFR BLD AUTO: 73 % (ref 43–75)
NRBC BLD AUTO-RTO: 0 /100 WBCS
PLATELET # BLD AUTO: 222 THOUSANDS/UL (ref 149–390)
PMV BLD AUTO: 10.4 FL (ref 8.9–12.7)
POTASSIUM SERPL-SCNC: 3.5 MMOL/L (ref 3.5–5.3)
RBC # BLD AUTO: 5.32 MILLION/UL (ref 3.81–5.12)
SODIUM SERPL-SCNC: 136 MMOL/L (ref 136–145)
WBC # BLD AUTO: 5.85 THOUSAND/UL (ref 4.31–10.16)

## 2018-02-14 PROCEDURE — 99024 POSTOP FOLLOW-UP VISIT: CPT | Performed by: SURGERY

## 2018-02-14 PROCEDURE — 36415 COLL VENOUS BLD VENIPUNCTURE: CPT | Performed by: SURGERY

## 2018-02-14 PROCEDURE — 85025 COMPLETE CBC W/AUTO DIFF WBC: CPT | Performed by: SURGERY

## 2018-02-14 PROCEDURE — 96375 TX/PRO/DX INJ NEW DRUG ADDON: CPT

## 2018-02-14 PROCEDURE — 99284 EMERGENCY DEPT VISIT MOD MDM: CPT

## 2018-02-14 PROCEDURE — 96365 THER/PROPH/DIAG IV INF INIT: CPT

## 2018-02-14 PROCEDURE — 80048 BASIC METABOLIC PNL TOTAL CA: CPT | Performed by: SURGERY

## 2018-02-14 PROCEDURE — C9113 INJ PANTOPRAZOLE SODIUM, VIA: HCPCS | Performed by: SURGERY

## 2018-02-14 RX ORDER — METOCLOPRAMIDE HYDROCHLORIDE 5 MG/ML
10 INJECTION INTRAMUSCULAR; INTRAVENOUS EVERY 6 HOURS PRN
Status: DISCONTINUED | OUTPATIENT
Start: 2018-02-14 | End: 2018-02-15 | Stop reason: HOSPADM

## 2018-02-14 RX ORDER — SODIUM CHLORIDE, SODIUM LACTATE, POTASSIUM CHLORIDE, CALCIUM CHLORIDE 600; 310; 30; 20 MG/100ML; MG/100ML; MG/100ML; MG/100ML
125 INJECTION, SOLUTION INTRAVENOUS CONTINUOUS
Status: DISCONTINUED | OUTPATIENT
Start: 2018-02-14 | End: 2018-02-14

## 2018-02-14 RX ORDER — ONDANSETRON 2 MG/ML
4 INJECTION INTRAMUSCULAR; INTRAVENOUS EVERY 4 HOURS
Status: DISCONTINUED | OUTPATIENT
Start: 2018-02-14 | End: 2018-02-15 | Stop reason: HOSPADM

## 2018-02-14 RX ORDER — ONDANSETRON 2 MG/ML
4 INJECTION INTRAMUSCULAR; INTRAVENOUS EVERY 4 HOURS PRN
Status: DISCONTINUED | OUTPATIENT
Start: 2018-02-14 | End: 2018-02-14

## 2018-02-14 RX ORDER — PANTOPRAZOLE SODIUM 40 MG/1
40 INJECTION, POWDER, FOR SOLUTION INTRAVENOUS ONCE
Status: COMPLETED | OUTPATIENT
Start: 2018-02-14 | End: 2018-02-14

## 2018-02-14 RX ORDER — ACETAMINOPHEN 160 MG/5ML
650 SUSPENSION, ORAL (FINAL DOSE FORM) ORAL EVERY 4 HOURS PRN
Status: DISCONTINUED | OUTPATIENT
Start: 2018-02-14 | End: 2018-02-15 | Stop reason: HOSPADM

## 2018-02-14 RX ORDER — SODIUM CHLORIDE 9 MG/ML
125 INJECTION, SOLUTION INTRAVENOUS CONTINUOUS
Status: DISCONTINUED | OUTPATIENT
Start: 2018-02-14 | End: 2018-02-15 | Stop reason: HOSPADM

## 2018-02-14 RX ADMIN — SODIUM CHLORIDE 125 ML/HR: 0.9 INJECTION, SOLUTION INTRAVENOUS at 19:00

## 2018-02-14 RX ADMIN — ONDANSETRON 4 MG: 2 INJECTION INTRAMUSCULAR; INTRAVENOUS at 16:10

## 2018-02-14 RX ADMIN — PANTOPRAZOLE SODIUM 40 MG: 40 INJECTION, POWDER, FOR SOLUTION INTRAVENOUS at 16:13

## 2018-02-14 RX ADMIN — ASCORBIC ACID, VITAMIN A PALMITATE, CHOLECALCIFEROL, THIAMINE HYDROCHLORIDE, RIBOFLAVIN-5 PHOSPHATE SODIUM, PYRIDOXINE HYDROCHLORIDE, NIACINAMIDE, DEXPANTHENOL, ALPHA-TOCOPHEROL ACETATE, VITAMIN K1, FOLIC ACID, BIOTIN, CYANOCOBALAMIN: 200; 3300; 200; 6; 3.6; 6; 40; 15; 10; 150; 600; 60; 5 INJECTION, SOLUTION INTRAVENOUS at 16:22

## 2018-02-14 RX ADMIN — ACETAMINOPHEN 650 MG: 160 SUSPENSION ORAL at 16:18

## 2018-02-14 RX ADMIN — ONDANSETRON 4 MG: 2 INJECTION INTRAMUSCULAR; INTRAVENOUS at 20:25

## 2018-02-14 RX ADMIN — METOCLOPRAMIDE 10 MG: 5 INJECTION, SOLUTION INTRAMUSCULAR; INTRAVENOUS at 17:09

## 2018-02-14 NOTE — TELEPHONE ENCOUNTER
Patient's  called on behalf of wife  She spoke to RN this morning but is feeling a lot worse and unable to walk form weakness  I told her  to take her to the ER and I would e-mail Dr eXna Choudhury in regards to her symptoms

## 2018-02-14 NOTE — H&P
Bariatric Surgery H&P    CC: nausea    HPI: 47F s/p laparoscopic revision of RYGB on 1/24/18 for a chronic marginal ulcer & dilated gastric fundus  She was discharge on POD 5 and has since done well  She was planning on advancing her diet to soft foods as directed by her primary surgeon, Dr Ro Salgado  However, 2 days ago she developed progressive nausea, early satiety and anorexia, and frequent watery diarrhea  This progressed yesterday to weakness and fatigue  Today, after notifying the bariatric surgery clinic, she was instructed to present to the emergency department for evaluation  She denies deviation from her prescribed water and protein shake diet  She denies new or significant pain; she has not been taking pain medication at home for the last several weeks  ROS  Denies fever  + chills  Denies headache  Denies shortness of breath  Denies chest pain or palpitations  Denies numbness or tingling of her extremities  Diarrhea is watery, non painful and does not contain blood  + subjective decrease in urination  + dry skin  Denies peripheral edema  Denies recent antibiotic use    Past Medical History:   Diagnosis Date    Anemia     iron deicient    Anxiety     panic disorder    [facility] disorder ([facility])     Dental cavity     lower left back removed on 1/5/18 and sutures present to be removed 1/12     Depression     GERD (gastroesophageal reflux disease)     History of bariatric surgery     RUEN-Y  10/2006  wt loss 166lb w/ gain now    Hypoglycemia     Morbid obesity (HCC)     Nausea     PCOS (polycystic ovarian syndrome)     Shortness of breath     going up stairs    Sleep apnea     "doesn't use machine"    Stomach ulcer     Tooth loose     upper front     Past Surgical History:   Procedure Laterality Date    BARIATRIC SURGERY      approx 11 yrs ago    CHOLECYSTECTOMY      ESOPHAGOGASTRODUODENOSCOPY N/A 1/24/2018    Procedure: INTRAOPERATIVE ESOPHAGOGASTRODUODENOSCOPY (EGD);   Surgeon: Ransom Lanes, MD;  [facility];  Service: Bariatrics    DC EGD TRANSORAL BIOPSY SINGLE/MULTIPLE N/A 10/4/2017    Procedure: ESOPHAGOGASTRODUODENOSCOPY (EGD) with biopsy;  Surgeon: Ransom Lanes, MD;  [facility]; Service: Bariatrics    DC LAP GASTRIC BYPASS/REINALDO-EN-Y N/A 1/24/2018    Procedure: LAPAROSCOPIC REVISION OF REINALDO-EN-Y GASTRIC BYPASS ROBOTICALLY ASSISTED;  Surgeon: Ransom Lanes, MD;  [facility];  Service: Bariatrics    TOTAL BODY LIFT      approx 2008    UPPER GASTROINTESTINAL ENDOSCOPY      WISDOM TOOTH EXTRACTION       No current facility-administered medications on file prior to encounter        Current Outpatient Prescriptions on File Prior to Encounter   Medication Sig Dispense Refill    acetaminophen (TYLENOL) 500 mg tablet Take 500 mg by mouth every 6 (six) hours as needed for mild pain      buPROPion (WELLBUTRIN) 100 mg tablet TK 1 T PO TID  0    calcium citrate (CALCITRATE) 950 MG tablet Take 1 tablet by mouth daily      carBAMazepine (TEGretol) 200 mg tablet Take 200 mg by mouth 2 (two) times a day        Cyanocobalamin (VITAMIN B-12 IJ) Inject 1 Dose as directed once a week      Cyanocobalamin (VITAMIN B-12 SL) Place under the tongue daily      ferrous sulfate 325 (65 Fe) mg tablet Take 325 mg by mouth daily with breakfast      methylphenidate (RITALIN) 5 mg tablet Take 5 mg by mouth 3 (three) times a day before meals      Multiple Vitamin (MULTIVITAMIN) capsule Take 1 capsule by mouth daily      omeprazole (PriLOSEC OTC) 20 MG tablet Take 1 tablet by mouth daily for 30 days 30 tablet 0    omeprazole (PriLOSEC) 20 mg delayed release capsule       PREVIDENT 5000 BOOSTER PLUS 1 1 % PSTE USE AS DIRECTED BY DOCTOR  5    QUEtiapine (SEROquel) 50 mg tablet Take 50 mg by mouth daily at bedtime      venlafaxine (EFFEXOR) 100 MG tablet TK 1 T PO TID  0    venlafaxine (EFFEXOR) 75 mg tablet Take 1 tablet (75 mg total) by mouth 2 (two) times a day  0 Exam  Vitals:    02/14/18 1501   BP: 132/90   Pulse: (!) 107   Resp: 18   Temp: 98 7 °F (37 1 °C)   SpO2: 94%   NAD, alert, tired  No pallor  Dry MM  No JVD  Normal inspiratory effort  RRR  Abdomen soft, non-tender, non-distended  Distal pulses palpable  No peripheral edema    Labs  H/H 15 7/48 2 (9 9/32 1 at baseline)  WBC 5 9  Cr 0 8 (baseline 0 6)    Assessment  47F s/p lap revision of RYGB with fatigue & dehydration 2/2 anorexia and nausea x2 days  Possible etiologies include gastroenteritis, other infectious etiology, resurgent reflux disease, dumping syndrome (unlikely given low PO intake), or medication reaction      Plan  - IV fluids -- bolus & infusion, with infuvite  - IV antiemetics & PPI  - check CBC, BMP  - admit to observation for rehydration and symptom control

## 2018-02-14 NOTE — ED NOTES
Pharmacy called for multivitamin infusion  Per Dr Lizet Cedeño, infuse multivitamin in 1000L of NS        Branden Benavides RN  02/14/18 6036

## 2018-02-14 NOTE — TELEPHONE ENCOUNTER
Pt called concerning that she cannot keep anything down for the past week  Surgery date 1/24/18  Had been tolerating protein shakes and protein water before then  Went back to just liquids and sipping but states that since yesterday she is unable to tolerate water and ice chips and has also started with liquid diarrhea  Unable to keep omeprazole down so she has not taken it for the past two days  States that she has dry mucous membranes and feels dehydrated  Encouraged pt to try and take more sips in today  If unable to tolerate, advised to go to ER for possible fluid replenishment

## 2018-02-15 VITALS
DIASTOLIC BLOOD PRESSURE: 59 MMHG | BODY MASS INDEX: 36.03 KG/M2 | SYSTOLIC BLOOD PRESSURE: 109 MMHG | TEMPERATURE: 98.7 F | OXYGEN SATURATION: 96 % | HEART RATE: 92 BPM | RESPIRATION RATE: 18 BRPM | WEIGHT: 216.27 LBS | HEIGHT: 65 IN

## 2018-02-15 PROBLEM — R11.2 NAUSEA & VOMITING: Status: ACTIVE | Noted: 2018-02-15

## 2018-02-15 PROCEDURE — 99024 POSTOP FOLLOW-UP VISIT: CPT | Performed by: SURGERY

## 2018-02-15 PROCEDURE — 99217 PR OBSERVATION CARE DISCHARGE MANAGEMENT: CPT | Performed by: SURGERY

## 2018-02-15 RX ORDER — ONDANSETRON 4 MG/1
4 TABLET, ORALLY DISINTEGRATING ORAL EVERY 6 HOURS PRN
Qty: 20 TABLET | Refills: 0 | Status: SHIPPED | OUTPATIENT
Start: 2018-02-15 | End: 2018-05-03

## 2018-02-15 RX ORDER — DEXAMETHASONE SODIUM PHOSPHATE 4 MG/ML
4 INJECTION, SOLUTION INTRA-ARTICULAR; INTRALESIONAL; INTRAMUSCULAR; INTRAVENOUS; SOFT TISSUE ONCE
Status: COMPLETED | OUTPATIENT
Start: 2018-02-15 | End: 2018-02-15

## 2018-02-15 RX ADMIN — DEXAMETHASONE SODIUM PHOSPHATE 4 MG: 4 INJECTION, SOLUTION INTRAMUSCULAR; INTRAVENOUS at 13:30

## 2018-02-15 RX ADMIN — ONDANSETRON 4 MG: 2 INJECTION INTRAMUSCULAR; INTRAVENOUS at 00:17

## 2018-02-15 RX ADMIN — ONDANSETRON 4 MG: 2 INJECTION INTRAMUSCULAR; INTRAVENOUS at 10:33

## 2018-02-15 RX ADMIN — ONDANSETRON 4 MG: 2 INJECTION INTRAMUSCULAR; INTRAVENOUS at 12:10

## 2018-02-15 RX ADMIN — SODIUM CHLORIDE 125 ML/HR: 0.9 INJECTION, SOLUTION INTRAVENOUS at 12:10

## 2018-02-15 RX ADMIN — ONDANSETRON 4 MG: 2 INJECTION INTRAMUSCULAR; INTRAVENOUS at 06:29

## 2018-02-15 RX ADMIN — SODIUM CHLORIDE 125 ML/HR: 0.9 INJECTION, SOLUTION INTRAVENOUS at 03:44

## 2018-02-15 NOTE — DISCHARGE INSTRUCTIONS
Acute Nausea and Vomiting   WHAT YOU NEED TO KNOW:   Acute nausea and vomiting start suddenly, worsen quickly, and last a short time  DISCHARGE INSTRUCTIONS:   Seek care immediately if:   · You see blood in your vomit or your bowel movements  · You have sudden, severe pain in your chest and upper abdomen after hard vomiting or retching  · You have swelling in your neck and chest      · You are dizzy, cold, and thirsty and your eyes and mouth are dry  · You are urinating very little or not at all  · You have muscle weakness, leg cramps, and trouble breathing  · Your heart is beating much faster than normal      · You continue to vomit for more than 48 hours  Contact your healthcare provider if:   · You have frequent dry heaves (vomiting but nothing comes out)  · Your nausea and vomiting does not get better or go away after you use medicine  · You have questions or concerns about your condition or treatment  Medicines: You may need any of the following:  · Medicines  may be given to calm your stomach and stop your vomiting  You may also need medicines to help you feel more relaxed or to stop nausea and vomiting caused by motion sickness  · Gastrointestinal stimulants  are used to help empty your stomach and bowels  This may help decrease nausea and vomiting  · Take your medicine as directed  Contact your healthcare provider if you think your medicine is not helping or if you have side effects  Tell him or her if you are allergic to any medicine  Keep a list of the medicines, vitamins, and herbs you take  Include the amounts, and when and why you take them  Bring the list or the pill bottles to follow-up visits  Carry your medicine list with you in case of an emergency  Prevent or manage acute nausea and vomiting:   · Do not drink alcohol  Alcohol may upset or irritate your stomach  Too much alcohol can also cause acute nausea and vomiting  · Control stress    Headaches due to stress may cause nausea and vomiting  Find ways to relax and manage your stress  Get more rest and sleep  · Drink more liquids as directed  Vomiting can lead to dehydration  It is important to drink more liquids to help replace lost body fluids  Ask your healthcare provider how much liquid to drink each day and which liquids are best for you  Your provider may recommend that you drink an oral rehydration solution (ORS)  ORS contains water, salts, and sugar that are needed to replace the lost body fluids  Ask what kind of ORS to use, how much to drink, and where to get it  · Eat smaller meals, more often  Eat small amounts of food every 2 to 3 hours, even if you are not hungry  Food in your stomach may decrease your nausea  · Talk to your healthcare provider before you take over-the-counter (OTC) medicines  These medicines can cause serious problems if you use certain other medicines, or you have a medical condition  You may have problems if you use too much or use them for longer than the label says  Follow directions on the label carefully  Follow up with your healthcare provider as directed:  Write down your questions so you remember to ask them during your visits  © 2017 [facility] Information is for End User's use only and may not be sold, redistributed or otherwise used for commercial purposes  All illustrations and images included in CareNotes® are the copyrighted property of [employer]  or Kyler Gonzalez  The above information is an  only  It is not intended as medical advice for individual conditions or treatments  Talk to your doctor, nurse or pharmacist before following any medical regimen to see if it is safe and effective for you

## 2018-02-15 NOTE — PROGRESS NOTES
Bariatric Surgery Progress Note    Subjective  No adverse events  Feeling somewhat better with zofran  Able to keep down clears  No pain  Patient reports recent sick contacts (cold) at child's birthday party    Objective  Vitals:    02/15/18 0747   BP: 109/59   Pulse: 92   Resp: 18   Temp: 98 7 °F (37 1 °C)   SpO2: 96%     NAD, alert  Normal inspiratory effort  Abdomen soft, non-distended, appropriately tender    Labs  Deferred due to difficult stick    Assessment  47F with nausea/diarrhea & resulting dehydration s/p RYGB   Likely due to self-limited viral illness    Plan  - if tolerates diet, will discharge with prn Zofran  - encourage incentive spirometry, ambulation, clear liquids

## 2018-02-15 NOTE — CASE MANAGEMENT
Initial Clinical Review    Admission: Date/Time/Statement:   OBS  ORDER    2/14  @    1722     Orders Placed This Encounter   Procedures    Place in Observation     Standing Status:   Standing     Number of Occurrences:   1     Order Specific Question:   Admitting Physician     Answer:   Kelly Olivo     Order Specific Question:   Level of Care     Answer:   Med Surg [16]         ED: Date/Time/Mode of Arrival:   ED Arrival Information     Expected Arrival Acuity Means of Arrival Escorted By Service Admission Type    - 2/14/2018 14:52 Urgent Walk-In Spouse Bariatrics Urgent    Arrival Complaint    Post Surgical Vomiting          Chief Complaint:   Chief Complaint   Patient presents with    Vomiting     pt reports had bariatric surgery revision 3 weeks ago and now is vomiting and c/o abdominal pain  reports can't even keep ice chips down  History of Illness: 47F s/p laparoscopic revision of RYGB on 1/24/18 for a chronic marginal ulcer & dilated gastric fundus  She was discharge on POD 5 and has since done well  She was planning on advancing her diet to soft foods as directed by her primary surgeon, Dr Pasha Nelson  However, 2 days ago she developed progressive nausea, early satiety and anorexia, and frequent watery diarrhea  This progressed yesterday to weakness and fatigue  Today, after notifying the bariatric surgery clinic, she was instructed to present to the emergency department for evaluation  She denies deviation from her prescribed water and protein shake diet    She denies new or significant pain; she has not been taking pain medication at home for the last several weeks        ED Vital Signs:   ED Triage Vitals   Temperature Pulse Respirations Blood Pressure SpO2   02/14/18 1501 02/14/18 1501 02/14/18 1501 02/14/18 1501 02/14/18 1501   98 7 °F (37 1 °C) (!) 107 18 132/90 94 %      Temp Source Heart Rate Source Patient Position - Orthostatic VS BP Location FiO2 (%)   02/14/18 1501 02/14/18 1714 02/14/18 1714 02/14/18 1908 --   Oral Monitor Lying Right arm       Pain Score       02/14/18 1501       9        Wt Readings from Last 1 Encounters:   02/14/18 98 1 kg (216 lb 4 3 oz)       Vital Signs (abnormal):    above    Abnormal Labs/Diagnostic Test Results:   H/H   15 7/48 2    ED Treatment:   Medication Administration from 02/14/2018 1452 to 02/14/2018 1947       Date/Time Order Dose Route Action Action by Comments     02/14/2018 1613 pantoprazole (PROTONIX) injection 40 mg 40 mg Intravenous Given Aimee Mata RN      02/14/2018 1610 ondansetron (ZOFRAN) injection 4 mg 4 mg Intravenous Given Aimee Mata RN      02/14/2018 1618 acetaminophen (TYLENOL) oral suspension 650 mg 650 mg Oral Given Aimee Mata RN      02/14/2018 1709 metoclopramide (REGLAN) injection 10 mg 10 mg Intravenous Given Aimee Mata RN      02/14/2018 1621 metoclopramide (REGLAN) injection 10 mg 0 mg Intravenous Hold Aimee Mata RN administered zofran  will hold for prn      02/14/2018 1900 sodium chloride 0 9 % infusion 125 mL/hr Intravenous New [address] Aimee Mata RN      02/14/2018 1848 multivitamin (INFUVITE ADULT) 10 mL in sodium chloride 0 9 % 1,000 mL infusion 0 mL Intravenous Stopped Aimee Mata RN      02/14/2018 1622 multivitamin (INFUVITE ADULT) 10 mL in sodium chloride 0 9 % 1,000 mL infusion   Intravenous New Bag Aimee Mata RN           Past Medical/Surgical History:    Active Ambulatory Problems     Diagnosis Date Noted    Morbid obesity due to excess calories ([address] ) 01/25/2018    Sleep apnea     Morbid obesity ([address] )     History of bariatric surgery     Bipolar disorder ([address] )     Anemia      Resolved Ambulatory Problems     Diagnosis Date Noted    No Resolved Ambulatory Problems     Past Medical History:   Diagnosis Date    Anemia     Anxiety     Bipolar disorder ([address] )     Dental cavity     Depression     GERD (gastroesophageal reflux disease)     History of bariatric surgery     Hypoglycemia     Morbid obesity (HCC)     Nausea     PCOS (polycystic ovarian syndrome)     Shortness of breath     Sleep apnea     Stomach ulcer     Tooth loose        Admitting Diagnosis: Vomiting, unspecified [R11 10]    Age/Sex: 56 y o  female    Assessment/Plan:    47F s/p lap revision of RYGB with fatigue & dehydration 2/2 anorexia and nausea x2 days  Possible etiologies include gastroenteritis, other infectious etiology, resurgent reflux disease, dumping syndrome (unlikely given low PO intake), or medication reaction      Plan  - IV fluids -- bolus & infusion, with infuvite  - IV antiemetics & PPI  - check CBC, BMP  - admit to observation for rehydration and symptom control    Admission Orders:   OBS  ORDER    2/14  @    1722  Scheduled Meds:   Current Facility-Administered Medications:  acetaminophen 650 mg Oral Q4H PRN Huseyin Corcoran MD    metoclopramide 10 mg Intravenous Q6H PRN Huseyin Corcoran MD    ondansetron 4 mg Intravenous Q4H Huseyin Corcoran MD    sodium chloride 125 mL/hr Intravenous Continuous Huseyin Corcoran MD Last Rate: 125 mL/hr (02/15/18 0344)     Continuous Infusions:   sodium chloride 125 mL/hr Last Rate: 125 mL/hr (02/15/18 0344)     PRN Meds:   acetaminophen    metoclopramide     bariatric  Full liq  Diet    PROGRESS  NOTE   2/15  Assessment  47F with nausea/diarrhea & resulting dehydration s/p RYGB  Likely due to self-limited viral illness     Plan  - if tolerates diet, will discharge with prn Zofran  - encourage incentive spirometry, ambulation, clear liquids    Thank you,  [facility] in the [facility] by Kyler Gonzalez for 2017  Network Utilization Review Department  Phone: [phone]; Fax [phone]  ATTENTION: The Network Utilization Review Department is now centralized for our 7 Facilities  Make a note that we have a new phone and fax numbers for our Department   Please call with any questions or concerns to [phone] and carefully follow the prompts so that you are directed to the right person  All voicemails are confidential  Fax any determinations, approvals, denials, and requests for initial or continue stay review clinical to [phone]  Due to HIGH CALL volume, it would be easier if you could please send faxed requests to expedite your requests and in part, help us provide discharge notifications faster

## 2018-02-15 NOTE — DISCHARGE SUMMARY
Discharge Summary - Cal Garcia 56 y o  female MRN: [id number]    Unit/Bed#: E5 -01 Encounter: [id number]    Admission Date: 2/14/2018     Discharge Date: 02/15/18    Admitting Diagnosis: Vomiting, unspecified [R11 10]    Secondary Diagnosis:   Past Medical History:   Diagnosis Date    Anemia     iron deicient    Anxiety     panic disorder    Bipolar disorder ([facility] [address] )     Dental cavity     lower left back removed on 1/5/18 and sutures present to be removed 1/12     Depression     GERD (gastroesophageal reflux disease)     History of bariatric surgery     RUEN-Y  10/2006  wt loss 166lb w/ gain now    Hypoglycemia     Morbid obesity ([facility] [address] )     Nausea     PCOS (polycystic ovarian syndrome)     Shortness of breath     going up stairs    Sleep apnea     "doesn't use machine"    Stomach ulcer     Tooth loose     upper front       Discharge Diagnosis: Same, gastroenteritis    Procedures Performed: None    Consults: None    Hospital Course:  Patient is a 67-year-old woman status post recent bariatric surgery  She developed progressive nausea vomiting and diarrhea starting on February 13th  Workup in the emergency department revealed no mechanical issues with her gastric bypass anatomy, however she was very hemoconcentrated indicative of her dehydration  She was admitted to observation on February 14, 2018 for rehydration and nausea control  The following day her symptoms were greatly improved with these interventions, she was tolerating a clear liquid diet, and per her admission of recent multiple sick contacts and the absence of derangement of vital signs or serum laboratories, I believe that her recent illness was due to a self-limited viral gastroenteritis  She was discharged home with antiemetic medication and instructions to maintain adequate hydration        Disposition: The patient should follow up as scheduled with her bariatric providers and with Ciera Hayward MD as needed for medical management  The patient should refer to the handout "Discharge Instructions" for further information  Call physician for temperature over 101, wound redness or discharge, vomiting, or intolerance to diet  Discharge Medications:  See after visit summary for reconciled discharge medications provided to patient and family  This text is generated with voice recognition software  There may be translation, syntax,  or grammatical errors  If you have any questions, please contact the dictating provider

## 2018-02-16 ENCOUNTER — TELEPHONE (OUTPATIENT)
Dept: BARIATRICS | Facility: CLINIC | Age: 48
End: 2018-02-16

## 2018-02-19 ENCOUNTER — TELEPHONE (OUTPATIENT)
Dept: BARIATRICS | Facility: CLINIC | Age: 48
End: 2018-02-19

## 2018-02-19 NOTE — TELEPHONE ENCOUNTER
Spoke with patient to follow up after discharge  She states she is doing much better and tolerating her diet  Denies pain and nausea

## 2018-03-03 ENCOUNTER — TRANSCRIBE ORDERS (OUTPATIENT)
Dept: LAB | Facility: CLINIC | Age: 48
End: 2018-03-03

## 2018-03-03 ENCOUNTER — APPOINTMENT (OUTPATIENT)
Dept: LAB | Facility: CLINIC | Age: 48
End: 2018-03-03
Payer: COMMERCIAL

## 2018-03-03 DIAGNOSIS — F31.32 MODERATE DEPRESSED BIPOLAR I DISORDER (HCC): ICD-10-CM

## 2018-03-03 DIAGNOSIS — F31.32 MODERATE DEPRESSED BIPOLAR I DISORDER (HCC): Primary | ICD-10-CM

## 2018-03-03 LAB
ALBUMIN SERPL BCP-MCNC: 3.7 G/DL (ref 3.5–5)
ALP SERPL-CCNC: 79 U/L (ref 46–116)
ALT SERPL W P-5'-P-CCNC: 71 U/L (ref 12–78)
ANION GAP SERPL CALCULATED.3IONS-SCNC: 11 MMOL/L (ref 4–13)
AST SERPL W P-5'-P-CCNC: 33 U/L (ref 5–45)
BASOPHILS # BLD AUTO: 0.03 THOUSANDS/ΜL (ref 0–0.1)
BASOPHILS NFR BLD AUTO: 1 % (ref 0–1)
BILIRUB SERPL-MCNC: 0.3 MG/DL (ref 0.2–1)
BUN SERPL-MCNC: 7 MG/DL (ref 5–25)
CALCIUM SERPL-MCNC: 9.5 MG/DL (ref 8.3–10.1)
CARBAMAZEPINE SERPL-MCNC: 6 UG/ML (ref 4–12)
CHLORIDE SERPL-SCNC: 103 MMOL/L (ref 100–108)
CO2 SERPL-SCNC: 27 MMOL/L (ref 21–32)
CREAT SERPL-MCNC: 0.61 MG/DL (ref 0.6–1.3)
EOSINOPHIL # BLD AUTO: 0.15 THOUSAND/ΜL (ref 0–0.61)
EOSINOPHIL NFR BLD AUTO: 3 % (ref 0–6)
ERYTHROCYTE [DISTWIDTH] IN BLOOD BY AUTOMATED COUNT: 17.6 % (ref 11.6–15.1)
GFR SERPL CREATININE-BSD FRML MDRD: 109 ML/MIN/1.73SQ M
GLUCOSE P FAST SERPL-MCNC: 71 MG/DL (ref 65–99)
HCT VFR BLD AUTO: 44.3 % (ref 34.8–46.1)
HGB BLD-MCNC: 14 G/DL (ref 11.5–15.4)
LYMPHOCYTES # BLD AUTO: 1.85 THOUSANDS/ΜL (ref 0.6–4.47)
LYMPHOCYTES NFR BLD AUTO: 36 % (ref 14–44)
MCH RBC QN AUTO: 28.7 PG (ref 26.8–34.3)
MCHC RBC AUTO-ENTMCNC: 31.6 G/DL (ref 31.4–37.4)
MCV RBC AUTO: 91 FL (ref 82–98)
MONOCYTES # BLD AUTO: 0.3 THOUSAND/ΜL (ref 0.17–1.22)
MONOCYTES NFR BLD AUTO: 6 % (ref 4–12)
NEUTROPHILS # BLD AUTO: 2.84 THOUSANDS/ΜL (ref 1.85–7.62)
NEUTS SEG NFR BLD AUTO: 54 % (ref 43–75)
PLATELET # BLD AUTO: 227 THOUSANDS/UL (ref 149–390)
PMV BLD AUTO: 10 FL (ref 8.9–12.7)
POTASSIUM SERPL-SCNC: 4.4 MMOL/L (ref 3.5–5.3)
PROT SERPL-MCNC: 7.5 G/DL (ref 6.4–8.2)
RBC # BLD AUTO: 4.88 MILLION/UL (ref 3.81–5.12)
SODIUM SERPL-SCNC: 141 MMOL/L (ref 136–145)
WBC # BLD AUTO: 5.17 THOUSAND/UL (ref 4.31–10.16)

## 2018-03-03 PROCEDURE — 80053 COMPREHEN METABOLIC PANEL: CPT

## 2018-03-03 PROCEDURE — 85025 COMPLETE CBC W/AUTO DIFF WBC: CPT

## 2018-03-03 PROCEDURE — 36415 COLL VENOUS BLD VENIPUNCTURE: CPT

## 2018-03-03 PROCEDURE — 80156 ASSAY CARBAMAZEPINE TOTAL: CPT

## 2018-04-05 ENCOUNTER — TELEPHONE (OUTPATIENT)
Dept: BARIATRICS | Facility: CLINIC | Age: 48
End: 2018-04-05

## 2018-04-05 NOTE — TELEPHONE ENCOUNTER
Pt called office with complaint of reflux  She said it has been pretty bad and she can feel it in her chest  She has vomited on occasion after eating  Recommended pt go back to post op liquid diet for 2-3 days, increase her PPI to BID, and sleep on a slight incline  Told pt if it doesn't improve to call the office to be seen  Told her if she is unable to tolerate liquids or develops a fever to call right away  Pt understood and said she would

## 2018-05-03 ENCOUNTER — OFFICE VISIT (OUTPATIENT)
Dept: BARIATRICS | Facility: CLINIC | Age: 48
End: 2018-05-03
Payer: COMMERCIAL

## 2018-05-03 VITALS
BODY MASS INDEX: 30.53 KG/M2 | WEIGHT: 190 LBS | SYSTOLIC BLOOD PRESSURE: 112 MMHG | TEMPERATURE: 98.2 F | HEIGHT: 66 IN | RESPIRATION RATE: 20 BRPM | DIASTOLIC BLOOD PRESSURE: 70 MMHG | HEART RATE: 84 BPM

## 2018-05-03 DIAGNOSIS — Z98.84 HISTORY OF BARIATRIC SURGERY: ICD-10-CM

## 2018-05-03 DIAGNOSIS — R11.2 NON-INTRACTABLE VOMITING WITH NAUSEA, UNSPECIFIED VOMITING TYPE: Primary | ICD-10-CM

## 2018-05-03 PROBLEM — E66.01 MORBID OBESITY DUE TO EXCESS CALORIES (HCC): Status: RESOLVED | Noted: 2018-01-25 | Resolved: 2018-05-03

## 2018-05-03 PROCEDURE — 99212 OFFICE O/P EST SF 10 MIN: CPT | Performed by: SURGERY

## 2018-05-03 RX ORDER — OMEPRAZOLE 20 MG/1
20 TABLET, DELAYED RELEASE ORAL 2 TIMES DAILY
Qty: 30 TABLET | Refills: 0
Start: 2018-05-03 | End: 2018-05-17 | Stop reason: SDUPTHER

## 2018-05-03 RX ORDER — METOCLOPRAMIDE HYDROCHLORIDE 5 MG/5ML
5 SOLUTION ORAL
Qty: 120 ML | Refills: 1 | Status: SHIPPED | OUTPATIENT
Start: 2018-05-03 | End: 2018-05-17

## 2018-05-03 RX ORDER — SUCRALFATE 1 G/1
1 TABLET ORAL 4 TIMES DAILY
Qty: 60 TABLET | Refills: 0 | Status: SHIPPED | OUTPATIENT
Start: 2018-05-03 | End: 2018-05-17

## 2018-05-03 NOTE — PROGRESS NOTES
Assessment/Plan: patient is doing well 3 months following her robot assisted laparoscopic revision of her gastrojejunostomy for a chronic ulcer and stricture  She is hydrating well in her activities good  However, she reports frequent early satiety and spontaneous nausea and occasional vomiting especially following her oral psychiatric medications  We will trial oral Carafate as well as scheduled Reglan, as I believe the symptoms to be due to the small size of her pouch and her ongoing healing  She will continue twice a day omeprazole as well  She will follow up in 2 weeks to determine whether not these medications have helped and, if necessary, schedule an upper endoscopy  Diagnoses and all orders for this visit:    Non-intractable vomiting with nausea, unspecified vomiting type  -     metoclopramide (REGLAN) 5 mg/5 mL oral solution; Take 5 mL (5 mg total) by mouth 4 (four) times a day (before meals and at bedtime)    History of bariatric surgery  -     omeprazole (PriLOSEC OTC) 20 MG tablet; Take 1 tablet (20 mg total) by mouth 2 (two) times a day for 30 days  -     sucralfate (CARAFATE) 1 g tablet; Take 1 tablet (1 g total) by mouth 4 (four) times a day    Other orders  -     Sodium Fluoride 1 1 % PSTE; USE AS DIRECTED BY DOCTOR          Subjective: occasional nausea and vomiting  Positive ongoing weight loss but able to hydrate well  Denies pain  Patient ID: Samantha Ruano is a 56 y o  female      HPI per above    Review of Systems    Denies fever/chills  Denies lightheadedness  Denies visual changes  Denies dyspnea, cough  Denies chest pain  + nausea/vomiting  Denies change in urinary/bowel habits  Denies masses/hernias  Denies parasthesia  Denies peripheral edema    Objective:     Physical Exam    Vitals:    05/03/18 1001   BP: 112/70   Pulse: 84   Resp: 20   Temp: 98 2 °F (36 8 °C)     NAD, alert  No pallor  MMM  No JVD  RRR  Normal inspiratory effort  Abdomen soft, NT/ND  Incisions c/d/i, no masses or hernias  No peripheral edema  Normal affect, no agitation

## 2018-05-17 ENCOUNTER — OFFICE VISIT (OUTPATIENT)
Dept: BARIATRICS | Facility: CLINIC | Age: 48
End: 2018-05-17
Payer: COMMERCIAL

## 2018-05-17 VITALS
TEMPERATURE: 98 F | HEART RATE: 80 BPM | RESPIRATION RATE: 22 BRPM | DIASTOLIC BLOOD PRESSURE: 84 MMHG | HEIGHT: 66 IN | BODY MASS INDEX: 29.33 KG/M2 | WEIGHT: 182.5 LBS | SYSTOLIC BLOOD PRESSURE: 122 MMHG

## 2018-05-17 DIAGNOSIS — Z98.84 HISTORY OF BARIATRIC SURGERY: ICD-10-CM

## 2018-05-17 DIAGNOSIS — R11.2 NON-INTRACTABLE VOMITING WITH NAUSEA, UNSPECIFIED VOMITING TYPE: Primary | ICD-10-CM

## 2018-05-17 PROCEDURE — 99212 OFFICE O/P EST SF 10 MIN: CPT | Performed by: SURGERY

## 2018-05-17 RX ORDER — OMEPRAZOLE 20 MG/1
20 TABLET, DELAYED RELEASE ORAL DAILY
Qty: 90 TABLET | Refills: 0 | Status: SHIPPED | OUTPATIENT
Start: 2018-05-17 | End: 2019-12-30

## 2018-05-17 NOTE — PROGRESS NOTES
Assessment/Plan: Patient is 4 months status post robotic assisted laparoscopic gastrojejunostomy revision for chronic ulcer, stricture, and non excluded fundus  While she continues to hydrate well in her energy is good, she continues to report early satiety and occasional nausea  The absence of regular pain and the ineffectiveness of Carafate and Reglan since her last visit with me to believe this is mostly due to the very small size of her new pouch and that she likely does not have a marginal ulcer  She was counseled to stop the Reglan, Carafate, and reduce the omeprazole to once a day  Hopefully this will address her major concern which is the frequency of her medications  She will also follow up with her hematologist and psychiatrist regarding her other medications  She will follow up in 2 months with labs for her 6 month postoperative visit  Diagnoses and all orders for this visit:    Non-intractable vomiting with nausea, unspecified vomiting type    History of bariatric surgery  -     omeprazole (PriLOSEC OTC) 20 MG tablet; Take 1 tablet (20 mg total) by mouth daily for 30 days  -     TSH, 3rd generation with T4 reflex; Future  -     PTH, intact; Future  -     Vitamin A; Future  -     Vitamin B1 (Thiamine), Serum/Plasma, LC/MS/MS; Future  -     Vitamin B12; Future  -     Vitamin D 25 hydroxy; Future  -     Ferritin; Future  -     Folate; Future  -     Lipid panel; Future  -     Zinc; Future  -     Copper Level; Future  -     Prealbumin; Future        Subjective: ongoing early satiety  Concerned but not debilitated by high frequency of medications and feedings  Excess weight loss 65%  Patient ID: Bibiana Hyatt is a 56 y o  female      HPI per above    Review of Systems    Denies fever/chills  Denies lightheadedness  Denies visual changes  Denies dyspnea, cough  Denies chest pain  + occasional nausea  Denies change in urinary/bowel habits  Denies masses/hernias  Denies parasthesia  Denies peripheral edema  Denies smoking/NSAID's    Objective:     Physical Exam    Vitals:    05/17/18 0903   BP: 122/84   Pulse: 80   Resp: 22   Temp: 98 °F (36 7 °C)     NAD, alert  No pallor  MMM  No JVD  RRR  Normal inspiratory effort  Abdomen soft, NT/ND  Incisions c/d/i, no masses or hernias  No peripheral edema  Normal affect, no agitation

## 2018-06-04 ENCOUNTER — TRANSCRIBE ORDERS (OUTPATIENT)
Dept: ADMINISTRATIVE | Age: 48
End: 2018-06-04

## 2018-06-04 ENCOUNTER — APPOINTMENT (OUTPATIENT)
Dept: LAB | Age: 48
End: 2018-06-04
Payer: COMMERCIAL

## 2018-06-04 DIAGNOSIS — F31.30 BIPOLAR I DISORDER, MOST RECENT EPISODE DEPRESSED (HCC): ICD-10-CM

## 2018-06-04 DIAGNOSIS — Z98.84 HISTORY OF BARIATRIC SURGERY: ICD-10-CM

## 2018-06-04 DIAGNOSIS — Z98.84 BARIATRIC SURGERY STATUS: ICD-10-CM

## 2018-06-04 DIAGNOSIS — Z98.84 BARIATRIC SURGERY STATUS: Primary | ICD-10-CM

## 2018-06-04 DIAGNOSIS — F31.30 BIPOLAR I DISORDER, MOST RECENT EPISODE DEPRESSED (HCC): Primary | ICD-10-CM

## 2018-06-04 LAB
25(OH)D3 SERPL-MCNC: 30.7 NG/ML (ref 30–100)
ALBUMIN SERPL BCP-MCNC: 4.2 G/DL (ref 3.5–5)
ALP SERPL-CCNC: 81 U/L (ref 46–116)
ALT SERPL W P-5'-P-CCNC: 54 U/L (ref 12–78)
ANION GAP SERPL CALCULATED.3IONS-SCNC: 7 MMOL/L (ref 4–13)
AST SERPL W P-5'-P-CCNC: 31 U/L (ref 5–45)
BASOPHILS # BLD AUTO: 0.02 THOUSANDS/ΜL (ref 0–0.1)
BASOPHILS NFR BLD AUTO: 1 % (ref 0–1)
BILIRUB SERPL-MCNC: 0.41 MG/DL (ref 0.2–1)
BUN SERPL-MCNC: 10 MG/DL (ref 5–25)
CALCIUM SERPL-MCNC: 9.3 MG/DL (ref 8.3–10.1)
CHLORIDE SERPL-SCNC: 107 MMOL/L (ref 100–108)
CHOLEST SERPL-MCNC: 202 MG/DL (ref 50–200)
CO2 SERPL-SCNC: 27 MMOL/L (ref 21–32)
CREAT SERPL-MCNC: 0.6 MG/DL (ref 0.6–1.3)
EOSINOPHIL # BLD AUTO: 0.09 THOUSAND/ΜL (ref 0–0.61)
EOSINOPHIL NFR BLD AUTO: 3 % (ref 0–6)
ERYTHROCYTE [DISTWIDTH] IN BLOOD BY AUTOMATED COUNT: 13.3 % (ref 11.6–15.1)
FERRITIN SERPL-MCNC: 295 NG/ML (ref 8–388)
FOLATE SERPL-MCNC: 8.4 NG/ML (ref 3.1–17.5)
GFR SERPL CREATININE-BSD FRML MDRD: 110 ML/MIN/1.73SQ M
GLUCOSE P FAST SERPL-MCNC: 80 MG/DL (ref 65–99)
HCT VFR BLD AUTO: 45.1 % (ref 34.8–46.1)
HDLC SERPL-MCNC: 70 MG/DL (ref 40–60)
HGB BLD-MCNC: 14.6 G/DL (ref 11.5–15.4)
IMM GRANULOCYTES # BLD AUTO: 0.01 THOUSAND/UL (ref 0–0.2)
IMM GRANULOCYTES NFR BLD AUTO: 0 % (ref 0–2)
LDLC SERPL CALC-MCNC: 110 MG/DL (ref 0–100)
LYMPHOCYTES # BLD AUTO: 1.59 THOUSANDS/ΜL (ref 0.6–4.47)
LYMPHOCYTES NFR BLD AUTO: 44 % (ref 14–44)
MCH RBC QN AUTO: 32 PG (ref 26.8–34.3)
MCHC RBC AUTO-ENTMCNC: 32.4 G/DL (ref 31.4–37.4)
MCV RBC AUTO: 99 FL (ref 82–98)
MONOCYTES # BLD AUTO: 0.18 THOUSAND/ΜL (ref 0.17–1.22)
MONOCYTES NFR BLD AUTO: 5 % (ref 4–12)
NEUTROPHILS # BLD AUTO: 1.69 THOUSANDS/ΜL (ref 1.85–7.62)
NEUTS SEG NFR BLD AUTO: 47 % (ref 43–75)
NONHDLC SERPL-MCNC: 132 MG/DL
NRBC BLD AUTO-RTO: 0 /100 WBCS
PLATELET # BLD AUTO: 205 THOUSANDS/UL (ref 149–390)
PMV BLD AUTO: 10.6 FL (ref 8.9–12.7)
POTASSIUM SERPL-SCNC: 4 MMOL/L (ref 3.5–5.3)
PREALB SERPL-MCNC: 27.8 MG/DL (ref 18–40)
PROT SERPL-MCNC: 7.8 G/DL (ref 6.4–8.2)
PTH-INTACT SERPL-MCNC: 78.8 PG/ML (ref 18.4–80.1)
RBC # BLD AUTO: 4.56 MILLION/UL (ref 3.81–5.12)
SODIUM SERPL-SCNC: 141 MMOL/L (ref 136–145)
TRIGL SERPL-MCNC: 108 MG/DL
TSH SERPL DL<=0.05 MIU/L-ACNC: 1.25 UIU/ML (ref 0.36–3.74)
VIT B12 SERPL-MCNC: 539 PG/ML (ref 100–900)
WBC # BLD AUTO: 3.58 THOUSAND/UL (ref 4.31–10.16)

## 2018-06-04 PROCEDURE — 80157 ASSAY CARBAMAZEPINE FREE: CPT

## 2018-06-04 PROCEDURE — 84590 ASSAY OF VITAMIN A: CPT

## 2018-06-04 PROCEDURE — 83970 ASSAY OF PARATHORMONE: CPT

## 2018-06-04 PROCEDURE — 82525 ASSAY OF COPPER: CPT

## 2018-06-04 PROCEDURE — 80053 COMPREHEN METABOLIC PANEL: CPT

## 2018-06-04 PROCEDURE — 82306 VITAMIN D 25 HYDROXY: CPT

## 2018-06-04 PROCEDURE — 84443 ASSAY THYROID STIM HORMONE: CPT

## 2018-06-04 PROCEDURE — 36415 COLL VENOUS BLD VENIPUNCTURE: CPT

## 2018-06-04 PROCEDURE — 85025 COMPLETE CBC W/AUTO DIFF WBC: CPT

## 2018-06-04 PROCEDURE — 84425 ASSAY OF VITAMIN B-1: CPT

## 2018-06-04 PROCEDURE — 84630 ASSAY OF ZINC: CPT

## 2018-06-04 PROCEDURE — 82607 VITAMIN B-12: CPT

## 2018-06-04 PROCEDURE — 82746 ASSAY OF FOLIC ACID SERUM: CPT

## 2018-06-04 PROCEDURE — 84134 ASSAY OF PREALBUMIN: CPT

## 2018-06-04 PROCEDURE — 82728 ASSAY OF FERRITIN: CPT

## 2018-06-04 PROCEDURE — 80061 LIPID PANEL: CPT

## 2018-06-06 LAB
CARBAMAZEPINE FREE SERPL-MCNC: 0.5 UG/ML (ref 0.6–4.2)
COPPER SERPL-MCNC: 136 UG/DL (ref 72–166)
ZINC SERPL-MCNC: 90 UG/DL (ref 56–134)

## 2018-06-08 LAB
VIT A SERPL-MCNC: 52.8 UG/DL (ref 33.1–100)
VIT B1 BLD-SCNC: 98.9 NMOL/L (ref 66.5–200)

## 2018-06-11 ENCOUNTER — TELEPHONE (OUTPATIENT)
Dept: HEMATOLOGY ONCOLOGY | Facility: CLINIC | Age: 48
End: 2018-06-11

## 2018-06-11 NOTE — TELEPHONE ENCOUNTER
Calling to check on status of records request  She has called several times before, and told it is with [facility]--but [facility] did not have it  I checked the log on the [id number] website, and this patient was not on their list as having received a request for them to send records  Looked in chart to see volume of records  There is one note from 2017 from JOSÉ MANUEL Pate visit  Printed and faxed taht note to her

## 2018-06-18 ENCOUNTER — TELEPHONE (OUTPATIENT)
Dept: HEMATOLOGY ONCOLOGY | Facility: CLINIC | Age: 48
End: 2018-06-18

## 2018-06-18 NOTE — TELEPHONE ENCOUNTER
Erickson on [phone]   Told pt that her appt needed to be moved due to schedule complications and her new appt is 8/1/18 at 9:00am  Asked her to call back if that appt is not good for her

## 2018-07-16 RX ORDER — OLANZAPINE 5 MG/1
TABLET ORAL
Refills: 0 | COMMUNITY
Start: 2018-06-06 | End: 2019-12-30

## 2018-09-20 ENCOUNTER — DOCUMENTATION (OUTPATIENT)
Dept: BARIATRICS | Facility: CLINIC | Age: 48
End: 2018-09-20

## 2018-09-20 NOTE — PROGRESS NOTES
Weight Management Nutrition Class     Diagnosis: Obesity    Bariatric Surgeon: Dr Can Jolley    Surgery: Gastric Bypass Laparoscopic    Class: 9 month post op note    Topics discussed today include:     fluid goals post op, protein goals post op, chew food well, exercise, avoidance of alcohol, PPI use, diet progression, hypoglycemia, dumping syndrome, protein supplems, vitamin/mineral supplements and calcium supplements    Patient was able to verbalize basic diet (protein, fluid, vitamin and mineral) recommendations and possible nutrition-related complications  Yes     Attended 9 month follow up meeting  Addressed both behavioral and dietary issues  Group discussion included the impact of tobacco use, alcohol use, psychiatric medications, relationships, body image and self esteem issues as it pertains to the weight loss surgery patient  During group discussion, reviewed vitamin recommendations, protein recommendations, portion sizes, balancing diet to include healthy carbohydrates, importance of exercise, and the bariatric rules for success   Overall pleased with weight loss and improved quality of life

## 2019-02-22 ENCOUNTER — APPOINTMENT (OUTPATIENT)
Dept: LAB | Age: 49
End: 2019-02-22
Payer: COMMERCIAL

## 2019-02-22 ENCOUNTER — TRANSCRIBE ORDERS (OUTPATIENT)
Dept: ADMINISTRATIVE | Age: 49
End: 2019-02-22

## 2019-02-22 DIAGNOSIS — F31.30 BIPOLAR I DISORDER, MOST RECENT EPISODE DEPRESSED (HCC): ICD-10-CM

## 2019-02-22 DIAGNOSIS — F31.30 BIPOLAR I DISORDER, MOST RECENT EPISODE DEPRESSED (HCC): Primary | ICD-10-CM

## 2019-02-22 LAB
ALBUMIN SERPL BCP-MCNC: 4 G/DL (ref 3.5–5)
ALP SERPL-CCNC: 126 U/L (ref 46–116)
ALT SERPL W P-5'-P-CCNC: 97 U/L (ref 12–78)
ANION GAP SERPL CALCULATED.3IONS-SCNC: 5 MMOL/L (ref 4–13)
AST SERPL W P-5'-P-CCNC: 60 U/L (ref 5–45)
BASOPHILS # BLD AUTO: 0.02 THOUSANDS/ΜL (ref 0–0.1)
BASOPHILS NFR BLD AUTO: 0 % (ref 0–1)
BILIRUB SERPL-MCNC: 0.28 MG/DL (ref 0.2–1)
BUN SERPL-MCNC: 8 MG/DL (ref 5–25)
CALCIUM SERPL-MCNC: 9.1 MG/DL (ref 8.3–10.1)
CARBAMAZEPINE SERPL-MCNC: 6.2 UG/ML (ref 4–12)
CHLORIDE SERPL-SCNC: 104 MMOL/L (ref 100–108)
CO2 SERPL-SCNC: 29 MMOL/L (ref 21–32)
CREAT SERPL-MCNC: 0.59 MG/DL (ref 0.6–1.3)
EOSINOPHIL # BLD AUTO: 0.07 THOUSAND/ΜL (ref 0–0.61)
EOSINOPHIL NFR BLD AUTO: 2 % (ref 0–6)
ERYTHROCYTE [DISTWIDTH] IN BLOOD BY AUTOMATED COUNT: 13 % (ref 11.6–15.1)
GFR SERPL CREATININE-BSD FRML MDRD: 109 ML/MIN/1.73SQ M
GLUCOSE SERPL-MCNC: 77 MG/DL (ref 65–140)
HCT VFR BLD AUTO: 45 % (ref 34.8–46.1)
HGB BLD-MCNC: 14.5 G/DL (ref 11.5–15.4)
IMM GRANULOCYTES # BLD AUTO: 0.01 THOUSAND/UL (ref 0–0.2)
IMM GRANULOCYTES NFR BLD AUTO: 0 % (ref 0–2)
LYMPHOCYTES # BLD AUTO: 2.45 THOUSANDS/ΜL (ref 0.6–4.47)
LYMPHOCYTES NFR BLD AUTO: 54 % (ref 14–44)
MCH RBC QN AUTO: 32.9 PG (ref 26.8–34.3)
MCHC RBC AUTO-ENTMCNC: 32.2 G/DL (ref 31.4–37.4)
MCV RBC AUTO: 102 FL (ref 82–98)
MONOCYTES # BLD AUTO: 0.23 THOUSAND/ΜL (ref 0.17–1.22)
MONOCYTES NFR BLD AUTO: 5 % (ref 4–12)
NEUTROPHILS # BLD AUTO: 1.78 THOUSANDS/ΜL (ref 1.85–7.62)
NEUTS SEG NFR BLD AUTO: 39 % (ref 43–75)
NRBC BLD AUTO-RTO: 0 /100 WBCS
PLATELET # BLD AUTO: 245 THOUSANDS/UL (ref 149–390)
PMV BLD AUTO: 9.5 FL (ref 8.9–12.7)
POTASSIUM SERPL-SCNC: 4.4 MMOL/L (ref 3.5–5.3)
PROT SERPL-MCNC: 7.8 G/DL (ref 6.4–8.2)
RBC # BLD AUTO: 4.41 MILLION/UL (ref 3.81–5.12)
SODIUM SERPL-SCNC: 138 MMOL/L (ref 136–145)
WBC # BLD AUTO: 4.56 THOUSAND/UL (ref 4.31–10.16)

## 2019-02-22 PROCEDURE — 80156 ASSAY CARBAMAZEPINE TOTAL: CPT

## 2019-02-22 PROCEDURE — 80053 COMPREHEN METABOLIC PANEL: CPT

## 2019-02-22 PROCEDURE — 85025 COMPLETE CBC W/AUTO DIFF WBC: CPT

## 2019-02-22 PROCEDURE — 36415 COLL VENOUS BLD VENIPUNCTURE: CPT

## 2019-06-03 ENCOUNTER — TELEPHONE (OUTPATIENT)
Dept: BARIATRICS | Facility: CLINIC | Age: 49
End: 2019-06-03

## 2019-12-11 ENCOUNTER — TRANSCRIBE ORDERS (OUTPATIENT)
Dept: ADMINISTRATIVE | Age: 49
End: 2019-12-11

## 2019-12-11 ENCOUNTER — OFFICE VISIT (OUTPATIENT)
Dept: LAB | Age: 49
End: 2019-12-11
Payer: COMMERCIAL

## 2019-12-11 ENCOUNTER — APPOINTMENT (OUTPATIENT)
Dept: LAB | Age: 49
End: 2019-12-11
Payer: COMMERCIAL

## 2019-12-11 DIAGNOSIS — Z79.899 ENCOUNTER FOR LONG-TERM (CURRENT) USE OF OTHER MEDICATIONS: ICD-10-CM

## 2019-12-11 DIAGNOSIS — Z79.899 ENCOUNTER FOR LONG-TERM (CURRENT) USE OF OTHER MEDICATIONS: Primary | ICD-10-CM

## 2019-12-11 LAB
25(OH)D3 SERPL-MCNC: 21.7 NG/ML (ref 30–100)
ALBUMIN SERPL BCP-MCNC: 3.7 G/DL (ref 3.5–5)
ALP SERPL-CCNC: 83 U/L (ref 46–116)
ALT SERPL W P-5'-P-CCNC: 54 U/L (ref 12–78)
ANION GAP SERPL CALCULATED.3IONS-SCNC: 4 MMOL/L (ref 4–13)
AST SERPL W P-5'-P-CCNC: 19 U/L (ref 5–45)
ATRIAL RATE: 89 BPM
BASOPHILS # BLD AUTO: 0.03 THOUSANDS/ΜL (ref 0–0.1)
BASOPHILS NFR BLD AUTO: 1 % (ref 0–1)
BILIRUB SERPL-MCNC: 0.41 MG/DL (ref 0.2–1)
BUN SERPL-MCNC: 12 MG/DL (ref 5–25)
CALCIUM SERPL-MCNC: 9.6 MG/DL (ref 8.3–10.1)
CHLORIDE SERPL-SCNC: 108 MMOL/L (ref 100–108)
CHOLEST SERPL-MCNC: 222 MG/DL (ref 50–200)
CO2 SERPL-SCNC: 28 MMOL/L (ref 21–32)
CREAT SERPL-MCNC: 0.58 MG/DL (ref 0.6–1.3)
EOSINOPHIL # BLD AUTO: 0.06 THOUSAND/ΜL (ref 0–0.61)
EOSINOPHIL NFR BLD AUTO: 1 % (ref 0–6)
ERYTHROCYTE [DISTWIDTH] IN BLOOD BY AUTOMATED COUNT: 12.8 % (ref 11.6–15.1)
FOLATE SERPL-MCNC: 8.1 NG/ML (ref 3.1–17.5)
GFR SERPL CREATININE-BSD FRML MDRD: 109 ML/MIN/1.73SQ M
GLUCOSE P FAST SERPL-MCNC: 77 MG/DL (ref 65–99)
HCT VFR BLD AUTO: 43.2 % (ref 34.8–46.1)
HDLC SERPL-MCNC: 83 MG/DL
HGB BLD-MCNC: 14.2 G/DL (ref 11.5–15.4)
IMM GRANULOCYTES # BLD AUTO: 0.01 THOUSAND/UL (ref 0–0.2)
IMM GRANULOCYTES NFR BLD AUTO: 0 % (ref 0–2)
IRON SERPL-MCNC: 54 UG/DL (ref 50–170)
LDLC SERPL CALC-MCNC: 119 MG/DL (ref 0–100)
LYMPHOCYTES # BLD AUTO: 1.76 THOUSANDS/ΜL (ref 0.6–4.47)
LYMPHOCYTES NFR BLD AUTO: 37 % (ref 14–44)
MCH RBC QN AUTO: 31.9 PG (ref 26.8–34.3)
MCHC RBC AUTO-ENTMCNC: 32.9 G/DL (ref 31.4–37.4)
MCV RBC AUTO: 97 FL (ref 82–98)
MONOCYTES # BLD AUTO: 0.34 THOUSAND/ΜL (ref 0.17–1.22)
MONOCYTES NFR BLD AUTO: 7 % (ref 4–12)
NEUTROPHILS # BLD AUTO: 2.59 THOUSANDS/ΜL (ref 1.85–7.62)
NEUTS SEG NFR BLD AUTO: 54 % (ref 43–75)
NONHDLC SERPL-MCNC: 139 MG/DL
NRBC BLD AUTO-RTO: 0 /100 WBCS
P AXIS: 69 DEGREES
PLATELET # BLD AUTO: 213 THOUSANDS/UL (ref 149–390)
PMV BLD AUTO: 9.2 FL (ref 8.9–12.7)
POTASSIUM SERPL-SCNC: 4.2 MMOL/L (ref 3.5–5.3)
PR INTERVAL: 174 MS
PROT SERPL-MCNC: 7.3 G/DL (ref 6.4–8.2)
QRS AXIS: 57 DEGREES
QRSD INTERVAL: 92 MS
QT INTERVAL: 344 MS
QTC INTERVAL: 418 MS
RBC # BLD AUTO: 4.45 MILLION/UL (ref 3.81–5.12)
SODIUM SERPL-SCNC: 140 MMOL/L (ref 136–145)
T WAVE AXIS: 36 DEGREES
T4 SERPL-MCNC: 7.9 UG/DL (ref 4.7–13.3)
TRIGL SERPL-MCNC: 101 MG/DL
TSH SERPL DL<=0.05 MIU/L-ACNC: 1.71 UIU/ML (ref 0.36–3.74)
VENTRICULAR RATE: 89 BPM
VIT B12 SERPL-MCNC: 791 PG/ML (ref 100–900)
WBC # BLD AUTO: 4.79 THOUSAND/UL (ref 4.31–10.16)

## 2019-12-11 PROCEDURE — 82746 ASSAY OF FOLIC ACID SERUM: CPT

## 2019-12-11 PROCEDURE — 85025 COMPLETE CBC W/AUTO DIFF WBC: CPT

## 2019-12-11 PROCEDURE — 82607 VITAMIN B-12: CPT

## 2019-12-11 PROCEDURE — 84436 ASSAY OF TOTAL THYROXINE: CPT

## 2019-12-11 PROCEDURE — 93005 ELECTROCARDIOGRAM TRACING: CPT

## 2019-12-11 PROCEDURE — 82306 VITAMIN D 25 HYDROXY: CPT

## 2019-12-11 PROCEDURE — 80053 COMPREHEN METABOLIC PANEL: CPT

## 2019-12-11 PROCEDURE — 83540 ASSAY OF IRON: CPT

## 2019-12-11 PROCEDURE — 84443 ASSAY THYROID STIM HORMONE: CPT

## 2019-12-11 PROCEDURE — 93010 ELECTROCARDIOGRAM REPORT: CPT | Performed by: INTERNAL MEDICINE

## 2019-12-11 PROCEDURE — 80061 LIPID PANEL: CPT

## 2019-12-11 PROCEDURE — 36415 COLL VENOUS BLD VENIPUNCTURE: CPT

## 2019-12-30 ENCOUNTER — APPOINTMENT (OUTPATIENT)
Dept: RADIOLOGY | Age: 49
End: 2019-12-30
Payer: COMMERCIAL

## 2019-12-30 ENCOUNTER — OFFICE VISIT (OUTPATIENT)
Dept: INTERNAL MEDICINE CLINIC | Age: 49
End: 2019-12-30
Payer: COMMERCIAL

## 2019-12-30 VITALS
OXYGEN SATURATION: 96 % | HEART RATE: 88 BPM | TEMPERATURE: 97.8 F | DIASTOLIC BLOOD PRESSURE: 58 MMHG | BODY MASS INDEX: 36.06 KG/M2 | HEIGHT: 64 IN | WEIGHT: 211.2 LBS | SYSTOLIC BLOOD PRESSURE: 110 MMHG

## 2019-12-30 DIAGNOSIS — Z90.3 INTESTINAL MALABSORPTION FOLLOWING GASTRECTOMY: ICD-10-CM

## 2019-12-30 DIAGNOSIS — F31.9 BIPOLAR AFFECTIVE DISORDER, REMISSION STATUS UNSPECIFIED (HCC): ICD-10-CM

## 2019-12-30 DIAGNOSIS — M79.672 FOOT PAIN, BILATERAL: Primary | ICD-10-CM

## 2019-12-30 DIAGNOSIS — G47.33 OBSTRUCTIVE SLEEP APNEA SYNDROME: ICD-10-CM

## 2019-12-30 DIAGNOSIS — K91.2 INTESTINAL MALABSORPTION FOLLOWING GASTRECTOMY: ICD-10-CM

## 2019-12-30 DIAGNOSIS — M79.671 FOOT PAIN, BILATERAL: ICD-10-CM

## 2019-12-30 DIAGNOSIS — M79.671 FOOT PAIN, BILATERAL: Primary | ICD-10-CM

## 2019-12-30 DIAGNOSIS — M79.672 FOOT PAIN, BILATERAL: ICD-10-CM

## 2019-12-30 PROBLEM — R11.2 NAUSEA & VOMITING: Status: RESOLVED | Noted: 2018-02-15 | Resolved: 2019-12-30

## 2019-12-30 PROBLEM — F33.9 RECURRENT MAJOR DEPRESSIVE DISORDER (HCC): Status: ACTIVE | Noted: 2017-06-15

## 2019-12-30 PROBLEM — E28.2 POLYCYSTIC OVARIAN SYNDROME: Status: ACTIVE | Noted: 2017-08-31

## 2019-12-30 PROCEDURE — 3008F BODY MASS INDEX DOCD: CPT | Performed by: INTERNAL MEDICINE

## 2019-12-30 PROCEDURE — 99214 OFFICE O/P EST MOD 30 MIN: CPT | Performed by: INTERNAL MEDICINE

## 2019-12-30 PROCEDURE — 73630 X-RAY EXAM OF FOOT: CPT

## 2019-12-30 PROCEDURE — 1036F TOBACCO NON-USER: CPT | Performed by: INTERNAL MEDICINE

## 2019-12-30 RX ORDER — TRAZODONE HYDROCHLORIDE 50 MG/1
TABLET ORAL
Refills: 2 | COMMUNITY
Start: 2019-10-01 | End: 2021-01-27

## 2019-12-30 RX ORDER — LURASIDONE HYDROCHLORIDE 40 MG/1
TABLET, FILM COATED ORAL
COMMUNITY
Start: 2019-12-10 | End: 2021-01-27

## 2019-12-30 RX ORDER — ACETAMINOPHEN 500 MG
500 TABLET ORAL
COMMUNITY

## 2019-12-30 NOTE — ASSESSMENT & PLAN NOTE
She is here complaining of bilateral foot pain 1 is more in step related on the other is more her big toe  She is already using a store by insert at [place] and cannot take nonsteroidals  due to her gastric bypass    Will send her to Podiatry after x-rays were done

## 2019-12-30 NOTE — ASSESSMENT & PLAN NOTE
She continues to follow with bariatric and recently had blood work to follow her vitamins    She the mole abnormalities

## 2019-12-30 NOTE — ASSESSMENT & PLAN NOTE
She continues to follow with psych for her bipolar disorder and he is planning on switching her to lithium

## 2019-12-30 NOTE — ASSESSMENT & PLAN NOTE
She is status post bariatric surgery her obesity but has ups and Arron Rodriguezy with her weight  Currently she is on upswing    She was again encouraged in diet and exercise for same

## 2019-12-30 NOTE — PATIENT INSTRUCTIONS

## 2019-12-30 NOTE — PROGRESS NOTES
Assessment/Plan:    Bipolar disorder ([facility] [address] )  She continues to follow with psych for her bipolar disorder and he is planning on switching her to lithium    Intestinal malabsorption following gastrectomy  She continues to follow with bariatric and recently had blood work to follow her vitamins  She the mole abnormalities    Obstructive sleep apnea syndrome  She continues to not use CPAP for her sleep apnea    Foot pain, bilateral  She is here complaining of bilateral foot pain 1 is more in step related on the other is more her big toe  She is already using a store by insert at [place] and cannot take nonsteroidals  due to her gastric bypass  Will send her to Podiatry after x-rays were done    BMI 36 0-36 9,adult  She is status post bariatric surgery her obesity but has ups and Estil Locus with her weight  Currently she is on upswing  She was again encouraged in diet and exercise for same       Diagnoses and all orders for this visit:    Foot pain, bilateral  -     Ambulatory referral to Podiatry; Future  -     XR foot 3+ vw left; Future  -     XR foot 3+ vw right; Future    Bipolar affective disorder, remission status unspecified (HCC)    Intestinal malabsorption following gastrectomy    Obstructive sleep apnea syndrome    BMI 36 0-36 9,adult    Other orders  -     LATUDA 40 MG tablet  -     traZODone (DESYREL) 50 mg tablet; TAKE 1 TABLET BY MOUTH EVERY DAY AT NIGHT  -     acetaminophen (TYLENOL) 500 mg tablet; Take 500 mg by mouth          Subjective:      Patient ID: Leonela Martel is a 51 y o  female  She has had increasing bilateral foot pain for the past year  She even feels it at rest   Her left mostly at her big toe and her right foot at her instep  She is unable to take nonsteroidals  She has used OTC orthotics without relief  There has been no specific trauma          Review of Systems   Constitutional: Negative for chills, fatigue, fever and unexpected weight change     HENT: Negative for congestion, ear pain, hearing loss, postnasal drip, sinus pressure, sore throat, trouble swallowing and voice change  Eyes: Negative for visual disturbance  Respiratory: Negative for cough, chest tightness, shortness of breath and wheezing  Cardiovascular: Negative for chest pain, palpitations and leg swelling  Gastrointestinal: Negative for abdominal distention, abdominal pain, anal bleeding, blood in stool, constipation, diarrhea and nausea  Endocrine: Negative for cold intolerance, polydipsia, polyphagia and polyuria  Genitourinary: Negative for dysuria, flank pain, frequency, hematuria and urgency  Musculoskeletal: Negative for arthralgias, back pain, gait problem, joint swelling, myalgias and neck pain  Skin: Negative for rash  Allergic/Immunologic: Negative for immunocompromised state  Neurological: Negative for dizziness, syncope, facial asymmetry, weakness, light-headedness, numbness and headaches  Hematological: Negative for adenopathy  Psychiatric/Behavioral: Negative for confusion, sleep disturbance and suicidal ideas  Objective:      /58 (BP Location: Left arm, Patient Position: Sitting)   Pulse 88   Temp 97 8 °F (36 6 °C) (Tympanic)   Ht 5' 4" (1 626 m)   Wt 95 8 kg (211 lb 3 2 oz)   SpO2 96%   BMI 36 25 kg/m²          Physical Exam   Constitutional: She is oriented to person, place, and time  She appears well-developed and well-nourished  No distress  Obesity   HENT:   Right Ear: External ear normal    Left Ear: External ear normal    Nose: Nose normal    Mouth/Throat: Oropharynx is clear and moist  No oropharyngeal exudate  Eyes: Pupils are equal, round, and reactive to light  EOM are normal    Neck: Normal range of motion  Neck supple  No JVD present  No thyromegaly present  Cardiovascular: Normal rate, regular rhythm, normal heart sounds and intact distal pulses  Exam reveals no gallop  No murmur heard    Pulmonary/Chest: Effort normal and breath sounds normal  No respiratory distress  She has no wheezes  She has no rales  Abdominal: Soft  Bowel sounds are normal  She exhibits no distension and no mass  There is no tenderness  Musculoskeletal: Normal range of motion  She exhibits no tenderness  Lymphadenopathy:     She has no cervical adenopathy  Neurological: She is alert and oriented to person, place, and time  No cranial nerve deficit  Coordination normal    Skin: No rash noted  Psychiatric: She has a normal mood and affect  Her behavior is normal  Judgment and thought content normal    Vitals reviewed  BMI Counseling: Body mass index is 36 25 kg/m²  The BMI is above normal  Exercise recommendations include moderate aerobic physical activity for 150 minutes/week

## 2020-03-18 ENCOUNTER — OFFICE VISIT (OUTPATIENT)
Dept: INTERNAL MEDICINE CLINIC | Age: 50
End: 2020-03-18
Payer: COMMERCIAL

## 2020-03-18 VITALS
BODY MASS INDEX: 34.31 KG/M2 | SYSTOLIC BLOOD PRESSURE: 100 MMHG | OXYGEN SATURATION: 96 % | HEART RATE: 104 BPM | WEIGHT: 201 LBS | TEMPERATURE: 98.3 F | HEIGHT: 64 IN | DIASTOLIC BLOOD PRESSURE: 70 MMHG

## 2020-03-18 DIAGNOSIS — H66.93 OTITIS OF BOTH EARS: Primary | ICD-10-CM

## 2020-03-18 DIAGNOSIS — R42 VERTIGO: ICD-10-CM

## 2020-03-18 PROCEDURE — 99213 OFFICE O/P EST LOW 20 MIN: CPT | Performed by: INTERNAL MEDICINE

## 2020-03-18 PROCEDURE — 1036F TOBACCO NON-USER: CPT | Performed by: INTERNAL MEDICINE

## 2020-03-18 PROCEDURE — 3008F BODY MASS INDEX DOCD: CPT | Performed by: INTERNAL MEDICINE

## 2020-03-18 RX ORDER — PSEUDOEPHEDRINE HCL 120 MG/1
120 TABLET, FILM COATED, EXTENDED RELEASE ORAL 2 TIMES DAILY
Qty: 20 TABLET | Refills: 0 | Status: SHIPPED | OUTPATIENT
Start: 2020-03-18 | End: 2021-01-27

## 2020-03-18 RX ORDER — CARIPRAZINE 1.5 MG/1
CAPSULE, GELATIN COATED ORAL
COMMUNITY
Start: 2020-01-07 | End: 2021-01-27

## 2020-03-18 RX ORDER — FLUTICASONE PROPIONATE 50 MCG
2 SPRAY, SUSPENSION (ML) NASAL DAILY
Qty: 1 BOTTLE | Refills: 1 | Status: SHIPPED | OUTPATIENT
Start: 2020-03-18 | End: 2021-01-27

## 2020-03-18 RX ORDER — AMOXICILLIN 500 MG/1
500 CAPSULE ORAL EVERY 8 HOURS SCHEDULED
Qty: 30 CAPSULE | Refills: 0 | Status: SHIPPED | OUTPATIENT
Start: 2020-03-18 | End: 2020-03-28

## 2020-03-18 RX ORDER — LAMOTRIGINE 25 MG/1
TABLET ORAL
COMMUNITY
Start: 2020-03-05 | End: 2021-01-27

## 2020-03-18 RX ORDER — CHOLECALCIFEROL (VITAMIN D3) 50 MCG
TABLET ORAL
COMMUNITY

## 2020-03-18 RX ORDER — MECLIZINE HCL 12.5 MG/1
12.5 TABLET ORAL 3 TIMES DAILY PRN
Qty: 30 TABLET | Refills: 0 | Status: SHIPPED | OUTPATIENT
Start: 2020-03-18 | End: 2021-01-27

## 2020-03-18 NOTE — ASSESSMENT & PLAN NOTE
Patient complains of bilateral ear blockage with dizziness and head congestion over the past several days  She has not tried anything over-the-counter for it and she has no fever    I gave her amoxicillin along with Sudafed Flonase and anti

## 2020-03-18 NOTE — ASSESSMENT & PLAN NOTE
During the same time she has had mild episodes of vertigo without tinnitus    Hopefully this will was are head opens up but will also give her some Antivert

## 2020-03-18 NOTE — PATIENT INSTRUCTIONS
Vertigo   AMBULATORY CARE:   Vertigo  is a condition that causes you to feel dizzy  You may feel that you or everything around you is moving or spinning  You may also feel like you are being pulled down or toward your side  Common symptoms that may happen with vertigo:   · Nausea or vomiting    · Trouble with your balance    · Sensitivity to light or sound    · Weakness, slurred speech, problems seeing or moving, or increased sleepiness    · Facial weakness and headache    · Hearing loss, ear fullness or pain, or hearing ringing sounds    · Fast, uncontrolled movement of your eyes  Seek care immediately if:   · You have a headache and a stiff neck  · You have shaking chills and a fever  · You vomit over and over with no relief  · You have blood, pus, or fluid coming out of your ears  · You are confused  Contact your healthcare provider if:   · Your symptoms do not get better with treatment  · You have questions about your condition or care  Treatment for vertigo  may include resting in bed or avoid certain activities for a time  You may need to decrease or stop taking medicines that are causing your vertigo  Medicines may also be prescribed to help relieve your symptoms  Manage your symptoms:   · Do not drive , walk without help, or operate heavy machinery when you are dizzy  · Move slowly  when you move from one position to another position  Get up slowly from sitting or lying down  Sit or lie down right away if you feel dizzy  · Drink plenty of liquids  Liquids help prevent dehydration  Ask how much liquid to drink each day and which liquids are best for you  · Vestibular and balance rehabilitation therapy (VBRT)  is used to teach you exercises to improve your balance and strength  These exercises may help decrease your vertigo and improve your balance  Ask for more information about this therapy    Follow up with your healthcare provider as directed:  Write down your questions so you remember to ask them during your visits  © 2017 [address] Information is for End User's use only and may not be sold, redistributed or otherwise used for commercial purposes  All illustrations and images included in CareNotes® are the copyrighted property of A D [employer] M [employer] Inc  or Kyler Gonzalez  The above information is an  only  It is not intended as medical advice for individual conditions or treatments  Talk to your doctor, nurse or pharmacist before following any medical regimen to see if it is safe and effective for you

## 2020-03-18 NOTE — PROGRESS NOTES
Assessment/Plan:    BMI 36 0-36 9,adult  She has lost 9 lb since December with diet and exercise    Otitis of both ears  Patient complains of bilateral ear blockage with dizziness and head congestion over the past several days  She has not tried anything over-the-counter for it and she has no fever  I gave her amoxicillin along with Sudafed Flonase and anti    Vertigo  During the same time she has had mild episodes of vertigo without tinnitus  Hopefully this will was are head opens up but will also give her some Antivert       Diagnoses and all orders for this visit:    Otitis of both ears  -     amoxicillin (AMOXIL) 500 mg capsule; Take 1 capsule (500 mg total) by mouth every 8 (eight) hours for 10 days  -     fluticasone (FLONASE) 50 mcg/act nasal spray; 2 sprays into each nostril daily  -     pseudoephedrine (SUDAFED) 120 MG 12 hr tablet; Take 1 tablet (120 mg total) by mouth 2 (two) times a day    Vertigo  -     meclizine (ANTIVERT) 12 5 MG tablet; Take 1 tablet (12 5 mg total) by mouth 3 (three) times a day as needed for dizziness    Other orders  -     Cholecalciferol (VITAMIN D) 50 MCG (2000 UT) tablet; Take by mouth  -     lamoTRIgine (LaMICtal) 25 mg tablet; TK 1 T PO D FOR 7 DAYS  INCREASE TO 2 TS PO D  -     VRAYLAR 1 5 MG capsule          Subjective:      Patient ID: Gerry Welch is a 51 y o  female  Patient has had some blocked ears and mild nasal congestion and now is developed mild dizziness with occasional vertigo    Dizziness   This is a new problem  The current episode started in the past 7 days  The problem occurs intermittently  The problem has been unchanged  Associated symptoms include chills, congestion and vertigo  Pertinent negatives include no abdominal pain, arthralgias, chest pain, coughing, fatigue, fever, headaches, joint swelling, myalgias, nausea, neck pain, numbness, rash, sore throat or weakness  Associated symptoms comments: Blocked ears and nasal congestion   Nothing aggravates the symptoms  She has tried nothing for the symptoms  Review of Systems   Constitutional: Positive for chills  Negative for fatigue, fever and unexpected weight change  HENT: Positive for congestion, hearing loss and sinus pressure  Negative for ear pain, postnasal drip, sore throat, tinnitus, trouble swallowing and voice change  Eyes: Negative for visual disturbance  Respiratory: Negative for cough, chest tightness, shortness of breath and wheezing  Cardiovascular: Negative for chest pain, palpitations and leg swelling  Gastrointestinal: Negative for abdominal distention, abdominal pain, anal bleeding, blood in stool, constipation, diarrhea and nausea  Endocrine: Negative for cold intolerance, polydipsia, polyphagia and polyuria  Genitourinary: Negative for dysuria, flank pain, frequency, hematuria and urgency  Musculoskeletal: Negative for arthralgias, back pain, gait problem, joint swelling, myalgias and neck pain  Skin: Negative for rash  Allergic/Immunologic: Negative for immunocompromised state  Neurological: Positive for dizziness and vertigo  Negative for syncope, facial asymmetry, weakness, light-headedness, numbness and headaches  Hematological: Negative for adenopathy  Psychiatric/Behavioral: Negative for confusion, sleep disturbance and suicidal ideas  Objective:      /70 (BP Location: Left arm, Patient Position: Sitting)   Pulse 104   Temp 98 3 °F (36 8 °C) (Tympanic)   Ht 5' 4" (1 626 m)   Wt 91 2 kg (201 lb)   SpO2 96%   BMI 34 50 kg/m²          Physical Exam   Constitutional: She is oriented to person, place, and time  She appears well-developed and well-nourished  No distress  Obese   HENT:   Right Ear: External ear normal    Left Ear: External ear normal    Nose: Nose normal    Mouth/Throat: Oropharynx is clear and moist  No oropharyngeal exudate     Retracted eardrums, moderate nasal congestion   Eyes: Pupils are equal, round, and reactive to light  EOM are normal    No nystagmus   Neck: Normal range of motion  Neck supple  No JVD present  No thyromegaly present  Cardiovascular: Normal rate, regular rhythm, normal heart sounds and intact distal pulses  Exam reveals no gallop  No murmur heard  Pulmonary/Chest: Effort normal and breath sounds normal  No respiratory distress  She has no wheezes  She has no rales  Abdominal: Soft  Bowel sounds are normal  She exhibits no distension and no mass  There is no tenderness  Musculoskeletal: Normal range of motion  She exhibits no tenderness  Lymphadenopathy:     She has no cervical adenopathy  Neurological: She is alert and oriented to person, place, and time  No cranial nerve deficit  Coordination normal    Skin: No rash noted  Psychiatric: She has a normal mood and affect  Her behavior is normal  Judgment and thought content normal    Vitals reviewed

## 2020-05-01 ENCOUNTER — APPOINTMENT (OUTPATIENT)
Dept: LAB | Age: 50
End: 2020-05-01
Payer: COMMERCIAL

## 2020-05-01 ENCOUNTER — TRANSCRIBE ORDERS (OUTPATIENT)
Dept: ADMINISTRATIVE | Age: 50
End: 2020-05-01

## 2020-05-01 DIAGNOSIS — Z79.899 ENCOUNTER FOR LONG-TERM (CURRENT) USE OF OTHER MEDICATIONS: ICD-10-CM

## 2020-05-01 DIAGNOSIS — Z79.899 ENCOUNTER FOR LONG-TERM (CURRENT) USE OF OTHER MEDICATIONS: Primary | ICD-10-CM

## 2020-05-01 LAB
ALBUMIN SERPL BCP-MCNC: 4.1 G/DL (ref 3.5–5)
ALP SERPL-CCNC: 74 U/L (ref 46–116)
ALT SERPL W P-5'-P-CCNC: 19 U/L (ref 12–78)
ANION GAP SERPL CALCULATED.3IONS-SCNC: 4 MMOL/L (ref 4–13)
AST SERPL W P-5'-P-CCNC: 14 U/L (ref 5–45)
BASOPHILS # BLD AUTO: 0.03 THOUSANDS/ΜL (ref 0–0.1)
BASOPHILS NFR BLD AUTO: 1 % (ref 0–1)
BILIRUB SERPL-MCNC: 0.5 MG/DL (ref 0.2–1)
BUN SERPL-MCNC: 12 MG/DL (ref 5–25)
CALCIUM SERPL-MCNC: 9.4 MG/DL (ref 8.3–10.1)
CHLORIDE SERPL-SCNC: 109 MMOL/L (ref 100–108)
CHOLEST SERPL-MCNC: 220 MG/DL (ref 50–200)
CO2 SERPL-SCNC: 28 MMOL/L (ref 21–32)
CREAT SERPL-MCNC: 0.77 MG/DL (ref 0.6–1.3)
EOSINOPHIL # BLD AUTO: 0.18 THOUSAND/ΜL (ref 0–0.61)
EOSINOPHIL NFR BLD AUTO: 3 % (ref 0–6)
ERYTHROCYTE [DISTWIDTH] IN BLOOD BY AUTOMATED COUNT: 12.1 % (ref 11.6–15.1)
GFR SERPL CREATININE-BSD FRML MDRD: 92 ML/MIN/1.73SQ M
GLUCOSE P FAST SERPL-MCNC: 94 MG/DL (ref 65–99)
HCT VFR BLD AUTO: 46.4 % (ref 34.8–46.1)
HDLC SERPL-MCNC: 76 MG/DL
HGB BLD-MCNC: 14.9 G/DL (ref 11.5–15.4)
IMM GRANULOCYTES # BLD AUTO: 0.01 THOUSAND/UL (ref 0–0.2)
IMM GRANULOCYTES NFR BLD AUTO: 0 % (ref 0–2)
LDLC SERPL CALC-MCNC: 125 MG/DL (ref 0–100)
LITHIUM SERPL-SCNC: 0.8 MMOL/L (ref 0.5–1)
LYMPHOCYTES # BLD AUTO: 2.05 THOUSANDS/ΜL (ref 0.6–4.47)
LYMPHOCYTES NFR BLD AUTO: 38 % (ref 14–44)
MCH RBC QN AUTO: 30.2 PG (ref 26.8–34.3)
MCHC RBC AUTO-ENTMCNC: 32.1 G/DL (ref 31.4–37.4)
MCV RBC AUTO: 94 FL (ref 82–98)
MONOCYTES # BLD AUTO: 0.29 THOUSAND/ΜL (ref 0.17–1.22)
MONOCYTES NFR BLD AUTO: 5 % (ref 4–12)
NEUTROPHILS # BLD AUTO: 2.89 THOUSANDS/ΜL (ref 1.85–7.62)
NEUTS SEG NFR BLD AUTO: 53 % (ref 43–75)
NONHDLC SERPL-MCNC: 144 MG/DL
NRBC BLD AUTO-RTO: 0 /100 WBCS
PLATELET # BLD AUTO: 225 THOUSANDS/UL (ref 149–390)
PMV BLD AUTO: 9.8 FL (ref 8.9–12.7)
POTASSIUM SERPL-SCNC: 3.7 MMOL/L (ref 3.5–5.3)
PROT SERPL-MCNC: 7.5 G/DL (ref 6.4–8.2)
RBC # BLD AUTO: 4.93 MILLION/UL (ref 3.81–5.12)
SODIUM SERPL-SCNC: 141 MMOL/L (ref 136–145)
T4 FREE SERPL-MCNC: 0.86 NG/DL (ref 0.76–1.46)
TRIGL SERPL-MCNC: 95 MG/DL
TSH SERPL DL<=0.05 MIU/L-ACNC: 4.31 UIU/ML (ref 0.36–3.74)
WBC # BLD AUTO: 5.45 THOUSAND/UL (ref 4.31–10.16)

## 2020-05-01 PROCEDURE — 80053 COMPREHEN METABOLIC PANEL: CPT

## 2020-05-01 PROCEDURE — 36415 COLL VENOUS BLD VENIPUNCTURE: CPT

## 2020-05-01 PROCEDURE — 84443 ASSAY THYROID STIM HORMONE: CPT

## 2020-05-01 PROCEDURE — 85025 COMPLETE CBC W/AUTO DIFF WBC: CPT

## 2020-05-01 PROCEDURE — 80178 ASSAY OF LITHIUM: CPT

## 2020-05-01 PROCEDURE — 84439 ASSAY OF FREE THYROXINE: CPT

## 2020-05-01 PROCEDURE — 80061 LIPID PANEL: CPT

## 2020-06-05 ENCOUNTER — TELEPHONE (OUTPATIENT)
Dept: BARIATRICS | Facility: CLINIC | Age: 50
End: 2020-06-05

## 2020-09-09 ENCOUNTER — APPOINTMENT (OUTPATIENT)
Dept: LAB | Age: 50
End: 2020-09-09
Payer: COMMERCIAL

## 2020-09-09 ENCOUNTER — TRANSCRIBE ORDERS (OUTPATIENT)
Dept: ADMINISTRATIVE | Age: 50
End: 2020-09-09

## 2020-09-09 DIAGNOSIS — Z79.899 ENCOUNTER FOR LONG-TERM (CURRENT) USE OF OTHER MEDICATIONS: Primary | ICD-10-CM

## 2020-09-09 DIAGNOSIS — Z79.899 ENCOUNTER FOR LONG-TERM (CURRENT) USE OF OTHER MEDICATIONS: ICD-10-CM

## 2020-09-09 LAB
25(OH)D3 SERPL-MCNC: 38.6 NG/ML (ref 30–100)
ALBUMIN SERPL BCP-MCNC: 3.8 G/DL (ref 3.5–5)
ALP SERPL-CCNC: 67 U/L (ref 46–116)
ALT SERPL W P-5'-P-CCNC: 21 U/L (ref 12–78)
ANION GAP SERPL CALCULATED.3IONS-SCNC: 9 MMOL/L (ref 4–13)
AST SERPL W P-5'-P-CCNC: 18 U/L (ref 5–45)
BASOPHILS # BLD AUTO: 0.05 THOUSANDS/ΜL (ref 0–0.1)
BASOPHILS NFR BLD AUTO: 1 % (ref 0–1)
BILIRUB SERPL-MCNC: 0.47 MG/DL (ref 0.2–1)
BUN SERPL-MCNC: 12 MG/DL (ref 5–25)
CALCIUM SERPL-MCNC: 9.2 MG/DL (ref 8.3–10.1)
CHLORIDE SERPL-SCNC: 115 MMOL/L (ref 100–108)
CHOLEST SERPL-MCNC: 216 MG/DL (ref 50–200)
CO2 SERPL-SCNC: 20 MMOL/L (ref 21–32)
CREAT SERPL-MCNC: 0.7 MG/DL (ref 0.6–1.3)
EOSINOPHIL # BLD AUTO: 0.33 THOUSAND/ΜL (ref 0–0.61)
EOSINOPHIL NFR BLD AUTO: 6 % (ref 0–6)
ERYTHROCYTE [DISTWIDTH] IN BLOOD BY AUTOMATED COUNT: 13.4 % (ref 11.6–15.1)
GFR SERPL CREATININE-BSD FRML MDRD: 102 ML/MIN/1.73SQ M
GLUCOSE P FAST SERPL-MCNC: 102 MG/DL (ref 65–99)
HCT VFR BLD AUTO: 44.1 % (ref 34.8–46.1)
HDLC SERPL-MCNC: 77 MG/DL
HGB BLD-MCNC: 14.4 G/DL (ref 11.5–15.4)
IMM GRANULOCYTES # BLD AUTO: 0.01 THOUSAND/UL (ref 0–0.2)
IMM GRANULOCYTES NFR BLD AUTO: 0 % (ref 0–2)
LDLC SERPL CALC-MCNC: 123 MG/DL (ref 0–100)
LITHIUM SERPL-SCNC: 0.6 MMOL/L (ref 0.5–1)
LYMPHOCYTES # BLD AUTO: 2.14 THOUSANDS/ΜL (ref 0.6–4.47)
LYMPHOCYTES NFR BLD AUTO: 41 % (ref 14–44)
MCH RBC QN AUTO: 29.3 PG (ref 26.8–34.3)
MCHC RBC AUTO-ENTMCNC: 32.7 G/DL (ref 31.4–37.4)
MCV RBC AUTO: 90 FL (ref 82–98)
MONOCYTES # BLD AUTO: 0.26 THOUSAND/ΜL (ref 0.17–1.22)
MONOCYTES NFR BLD AUTO: 5 % (ref 4–12)
NEUTROPHILS # BLD AUTO: 2.45 THOUSANDS/ΜL (ref 1.85–7.62)
NEUTS SEG NFR BLD AUTO: 47 % (ref 43–75)
NONHDLC SERPL-MCNC: 139 MG/DL
NRBC BLD AUTO-RTO: 0 /100 WBCS
PLATELET # BLD AUTO: 248 THOUSANDS/UL (ref 149–390)
PMV BLD AUTO: 9.8 FL (ref 8.9–12.7)
POTASSIUM SERPL-SCNC: 3.8 MMOL/L (ref 3.5–5.3)
PROT SERPL-MCNC: 7.2 G/DL (ref 6.4–8.2)
RBC # BLD AUTO: 4.91 MILLION/UL (ref 3.81–5.12)
SODIUM SERPL-SCNC: 144 MMOL/L (ref 136–145)
T4 SERPL-MCNC: 8.6 UG/DL (ref 4.7–13.3)
TRIGL SERPL-MCNC: 78 MG/DL
TSH SERPL DL<=0.05 MIU/L-ACNC: 3.52 UIU/ML (ref 0.36–3.74)
WBC # BLD AUTO: 5.24 THOUSAND/UL (ref 4.31–10.16)

## 2020-09-09 PROCEDURE — 80061 LIPID PANEL: CPT

## 2020-09-09 PROCEDURE — 36415 COLL VENOUS BLD VENIPUNCTURE: CPT

## 2020-09-09 PROCEDURE — 84443 ASSAY THYROID STIM HORMONE: CPT

## 2020-09-09 PROCEDURE — 80053 COMPREHEN METABOLIC PANEL: CPT

## 2020-09-09 PROCEDURE — 82306 VITAMIN D 25 HYDROXY: CPT

## 2020-09-09 PROCEDURE — 85025 COMPLETE CBC W/AUTO DIFF WBC: CPT

## 2020-09-09 PROCEDURE — 84436 ASSAY OF TOTAL THYROXINE: CPT

## 2020-09-09 PROCEDURE — 80178 ASSAY OF LITHIUM: CPT

## 2020-09-30 ENCOUNTER — APPOINTMENT (OUTPATIENT)
Dept: LAB | Age: 50
End: 2020-09-30
Payer: COMMERCIAL

## 2020-09-30 DIAGNOSIS — Z79.899 ENCOUNTER FOR LONG-TERM (CURRENT) USE OF OTHER MEDICATIONS: ICD-10-CM

## 2020-09-30 LAB — LITHIUM SERPL-SCNC: 0.4 MMOL/L (ref 0.5–1)

## 2020-09-30 PROCEDURE — 80178 ASSAY OF LITHIUM: CPT

## 2020-09-30 PROCEDURE — 36415 COLL VENOUS BLD VENIPUNCTURE: CPT

## 2020-10-23 ENCOUNTER — TRANSCRIBE ORDERS (OUTPATIENT)
Dept: ADMINISTRATIVE | Age: 50
End: 2020-10-23

## 2020-10-23 ENCOUNTER — LAB (OUTPATIENT)
Dept: LAB | Age: 50
End: 2020-10-23
Payer: COMMERCIAL

## 2020-10-23 DIAGNOSIS — Z79.899 ENCOUNTER FOR LONG-TERM (CURRENT) USE OF OTHER MEDICATIONS: ICD-10-CM

## 2020-10-23 DIAGNOSIS — Z79.899 ENCOUNTER FOR LONG-TERM (CURRENT) USE OF OTHER MEDICATIONS: Primary | ICD-10-CM

## 2020-10-23 LAB — LITHIUM SERPL-SCNC: 0.6 MMOL/L (ref 0.5–1)

## 2020-10-23 PROCEDURE — 80178 ASSAY OF LITHIUM: CPT

## 2020-10-23 PROCEDURE — 36415 COLL VENOUS BLD VENIPUNCTURE: CPT

## 2021-01-12 ENCOUNTER — LAB (OUTPATIENT)
Dept: LAB | Age: 51
End: 2021-01-12
Payer: COMMERCIAL

## 2021-01-12 ENCOUNTER — TRANSCRIBE ORDERS (OUTPATIENT)
Dept: ADMINISTRATIVE | Age: 51
End: 2021-01-12

## 2021-01-12 DIAGNOSIS — Z79.899 ENCOUNTER FOR LONG-TERM (CURRENT) USE OF OTHER MEDICATIONS: Primary | ICD-10-CM

## 2021-01-12 DIAGNOSIS — Z79.899 ENCOUNTER FOR LONG-TERM (CURRENT) USE OF OTHER MEDICATIONS: ICD-10-CM

## 2021-01-12 LAB
25(OH)D3 SERPL-MCNC: 30.4 NG/ML (ref 30–100)
ALBUMIN SERPL BCP-MCNC: 4 G/DL (ref 3.5–5)
ALP SERPL-CCNC: 74 U/L (ref 46–116)
ALT SERPL W P-5'-P-CCNC: 28 U/L (ref 12–78)
ANION GAP SERPL CALCULATED.3IONS-SCNC: 3 MMOL/L (ref 4–13)
AST SERPL W P-5'-P-CCNC: 24 U/L (ref 5–45)
BASOPHILS # BLD AUTO: 0.04 THOUSANDS/ΜL (ref 0–0.1)
BASOPHILS NFR BLD AUTO: 1 % (ref 0–1)
BILIRUB SERPL-MCNC: 0.34 MG/DL (ref 0.2–1)
BUN SERPL-MCNC: 12 MG/DL (ref 5–25)
CALCIUM SERPL-MCNC: 10.2 MG/DL (ref 8.3–10.1)
CHLORIDE SERPL-SCNC: 113 MMOL/L (ref 100–108)
CHOLEST SERPL-MCNC: 235 MG/DL (ref 50–200)
CO2 SERPL-SCNC: 28 MMOL/L (ref 21–32)
CREAT SERPL-MCNC: 0.7 MG/DL (ref 0.6–1.3)
EOSINOPHIL # BLD AUTO: 0.08 THOUSAND/ΜL (ref 0–0.61)
EOSINOPHIL NFR BLD AUTO: 1 % (ref 0–6)
ERYTHROCYTE [DISTWIDTH] IN BLOOD BY AUTOMATED COUNT: 12.7 % (ref 11.6–15.1)
FERRITIN SERPL-MCNC: 133 NG/ML (ref 8–388)
FOLATE SERPL-MCNC: 18.1 NG/ML (ref 3.1–17.5)
GFR SERPL CREATININE-BSD FRML MDRD: 102 ML/MIN/1.73SQ M
GLUCOSE P FAST SERPL-MCNC: 80 MG/DL (ref 65–99)
HCT VFR BLD AUTO: 46.4 % (ref 34.8–46.1)
HDLC SERPL-MCNC: 77 MG/DL
HGB BLD-MCNC: 14.9 G/DL (ref 11.5–15.4)
IMM GRANULOCYTES # BLD AUTO: 0.02 THOUSAND/UL (ref 0–0.2)
IMM GRANULOCYTES NFR BLD AUTO: 0 % (ref 0–2)
IRON SERPL-MCNC: 70 UG/DL (ref 50–170)
LDLC SERPL CALC-MCNC: 139 MG/DL (ref 0–100)
LITHIUM SERPL-SCNC: <0.2 MMOL/L (ref 0.5–1)
LYMPHOCYTES # BLD AUTO: 2.16 THOUSANDS/ΜL (ref 0.6–4.47)
LYMPHOCYTES NFR BLD AUTO: 37 % (ref 14–44)
MCH RBC QN AUTO: 29.5 PG (ref 26.8–34.3)
MCHC RBC AUTO-ENTMCNC: 32.1 G/DL (ref 31.4–37.4)
MCV RBC AUTO: 92 FL (ref 82–98)
MONOCYTES # BLD AUTO: 0.27 THOUSAND/ΜL (ref 0.17–1.22)
MONOCYTES NFR BLD AUTO: 5 % (ref 4–12)
NEUTROPHILS # BLD AUTO: 3.2 THOUSANDS/ΜL (ref 1.85–7.62)
NEUTS SEG NFR BLD AUTO: 56 % (ref 43–75)
NONHDLC SERPL-MCNC: 158 MG/DL
NRBC BLD AUTO-RTO: 0 /100 WBCS
PLATELET # BLD AUTO: 218 THOUSANDS/UL (ref 149–390)
PMV BLD AUTO: 9.7 FL (ref 8.9–12.7)
POTASSIUM SERPL-SCNC: 4.1 MMOL/L (ref 3.5–5.3)
PROT SERPL-MCNC: 7.6 G/DL (ref 6.4–8.2)
RBC # BLD AUTO: 5.05 MILLION/UL (ref 3.81–5.12)
SODIUM SERPL-SCNC: 144 MMOL/L (ref 136–145)
T4 SERPL-MCNC: 10.1 UG/DL (ref 4.7–13.3)
TRIGL SERPL-MCNC: 96 MG/DL
TSH SERPL DL<=0.05 MIU/L-ACNC: 1.93 UIU/ML (ref 0.36–3.74)
VIT B12 SERPL-MCNC: 1847 PG/ML (ref 100–900)
WBC # BLD AUTO: 5.77 THOUSAND/UL (ref 4.31–10.16)

## 2021-01-12 PROCEDURE — 85025 COMPLETE CBC W/AUTO DIFF WBC: CPT

## 2021-01-12 PROCEDURE — 80053 COMPREHEN METABOLIC PANEL: CPT

## 2021-01-12 PROCEDURE — 80061 LIPID PANEL: CPT

## 2021-01-12 PROCEDURE — 82607 VITAMIN B-12: CPT

## 2021-01-12 PROCEDURE — 82746 ASSAY OF FOLIC ACID SERUM: CPT

## 2021-01-12 PROCEDURE — 80178 ASSAY OF LITHIUM: CPT

## 2021-01-12 PROCEDURE — 82728 ASSAY OF FERRITIN: CPT

## 2021-01-12 PROCEDURE — 83540 ASSAY OF IRON: CPT

## 2021-01-12 PROCEDURE — 84443 ASSAY THYROID STIM HORMONE: CPT

## 2021-01-12 PROCEDURE — 36415 COLL VENOUS BLD VENIPUNCTURE: CPT

## 2021-01-12 PROCEDURE — 82306 VITAMIN D 25 HYDROXY: CPT

## 2021-01-12 PROCEDURE — 84436 ASSAY OF TOTAL THYROXINE: CPT

## 2021-01-27 ENCOUNTER — OFFICE VISIT (OUTPATIENT)
Dept: INTERNAL MEDICINE CLINIC | Age: 51
End: 2021-01-27
Payer: COMMERCIAL

## 2021-01-27 VITALS
SYSTOLIC BLOOD PRESSURE: 110 MMHG | WEIGHT: 193 LBS | TEMPERATURE: 96.4 F | HEART RATE: 94 BPM | OXYGEN SATURATION: 98 % | DIASTOLIC BLOOD PRESSURE: 70 MMHG | BODY MASS INDEX: 32.95 KG/M2 | HEIGHT: 64 IN

## 2021-01-27 DIAGNOSIS — F32.A FATIGUE DUE TO DEPRESSION: Primary | ICD-10-CM

## 2021-01-27 DIAGNOSIS — F33.1 MODERATE EPISODE OF RECURRENT MAJOR DEPRESSIVE DISORDER (HCC): ICD-10-CM

## 2021-01-27 DIAGNOSIS — Z12.11 SCREEN FOR COLON CANCER: ICD-10-CM

## 2021-01-27 DIAGNOSIS — G47.33 OBSTRUCTIVE SLEEP APNEA SYNDROME: ICD-10-CM

## 2021-01-27 DIAGNOSIS — R53.83 FATIGUE DUE TO DEPRESSION: Primary | ICD-10-CM

## 2021-01-27 PROBLEM — R42 VERTIGO: Status: RESOLVED | Noted: 2020-03-18 | Resolved: 2021-01-27

## 2021-01-27 PROBLEM — F33.9 RECURRENT MAJOR DEPRESSIVE DISORDER (HCC): Status: RESOLVED | Noted: 2017-06-15 | Resolved: 2021-01-27

## 2021-01-27 PROBLEM — H66.93 OTITIS OF BOTH EARS: Status: RESOLVED | Noted: 2020-03-18 | Resolved: 2021-01-27

## 2021-01-27 PROCEDURE — 1036F TOBACCO NON-USER: CPT | Performed by: INTERNAL MEDICINE

## 2021-01-27 PROCEDURE — 99214 OFFICE O/P EST MOD 30 MIN: CPT | Performed by: INTERNAL MEDICINE

## 2021-01-27 PROCEDURE — 3008F BODY MASS INDEX DOCD: CPT | Performed by: INTERNAL MEDICINE

## 2021-01-27 RX ORDER — LITHIUM CARBONATE 150 MG/1
150 CAPSULE ORAL EVERY MORNING
COMMUNITY
Start: 2021-01-21

## 2021-01-27 RX ORDER — HYDROXYZINE PAMOATE 50 MG/1
50 CAPSULE ORAL 2 TIMES DAILY
COMMUNITY

## 2021-01-27 RX ORDER — LITHIUM CARBONATE 300 MG/1
900 CAPSULE ORAL
COMMUNITY
Start: 2021-01-21

## 2021-01-27 RX ORDER — TOPIRAMATE 25 MG/1
TABLET ORAL
COMMUNITY
Start: 2021-01-21

## 2021-01-27 RX ORDER — ROPINIROLE 2 MG/1
2 TABLET, FILM COATED ORAL
COMMUNITY
Start: 2021-01-21

## 2021-01-27 RX ORDER — AMOXICILLIN 500 MG/1
TABLET, FILM COATED ORAL
COMMUNITY
Start: 2021-01-21 | End: 2021-09-09 | Stop reason: ALTCHOICE

## 2021-01-27 RX ORDER — QUETIAPINE FUMARATE 100 MG/1
100 TABLET, FILM COATED ORAL
COMMUNITY
Start: 2021-01-21

## 2021-01-27 NOTE — PROGRESS NOTES
Assessment/Plan:    Intestinal malabsorption following gastrectomy  Patient recently had full blood work and her vitamins were all acceptable  Obstructive sleep apnea syndrome  She continues to deny any sleeping problems or snoring at night since having bariatric surgery    Episode of recurrent major depressive disorder ([address] )  She continues to follow regularly with psych    BMI 33 0-33 9,adult  She is mildly overweight and continues to try and follow a diet but admits that she is not doing so well since COVID    Fatigue due to depression  Her big complaint today is fatigue  She states she sleeps well is physically active with her grandkids and recently had full blood work by her psychiatrist   It is possibly due to the underlying stress from the [place] pandemic but she has had cyst tend to change her medicines since otherwise she is doing well    Screen for colon cancer  Currently her grandmother is dying from colon cancer and would like to get colonoscopy so will refer to GI  Hopefully they also do an upper GI secondary to some difficulty swallowing       Diagnoses and all orders for this visit:    Fatigue due to depression    Obstructive sleep apnea syndrome    BMI 33 0-33 9,adult    Moderate episode of recurrent major depressive disorder (HCC)    Screen for colon cancer  -     Ambulatory referral to Gastroenterology; Future    Other orders  -     amoxicillin (AMOXIL) 500 MG tablet; TAKE 1 TABLET BY MOUTH EVERY 8 HOURS UNTIL GONE  -     hydrOXYzine pamoate (VISTARIL) 50 mg capsule; Take 50 mg by mouth 2 (two) times a day  -     lithium carbonate 150 mg capsule; Take 150 mg by mouth every morning  -     lithium carbonate 300 mg capsule; Take 900 mg by mouth daily at bedtime  -     QUEtiapine (SEROquel) 100 mg tablet; Take 100 mg by mouth daily at bedtime  -     rOPINIRole (REQUIP) 2 mg tablet;  Take 2 mg by mouth daily at bedtime  -     Discontinue: PreviDent 5000 Sensitive 1 1-5 % PSTE  -     topiramate (TOPAMAX) 25 mg tablet; TAKE 3 TABLETS BY MOUTH THREE TIMES DAILY          Subjective:      Patient ID: Marcelo Hillman is a 53 y o  female  She is here mostly for increasing fatigue  Hyperlipidemia  This is a recurrent problem  The current episode started more than 1 year ago  The problem is uncontrolled  Recent lipid tests were reviewed and are high  Exacerbating diseases include obesity  Factors aggravating her hyperlipidemia include atypical antipsychotics and fatty foods  Pertinent negatives include no chest pain, focal sensory loss, focal weakness, myalgias or shortness of breath  She is currently on no antihyperlipidemic treatment  The current treatment provides no improvement of lipids  Compliance problems include adherence to diet, adherence to exercise and medication side effects (She has difficulty swallowing fish oil)  Risk factors for coronary artery disease include family history, dyslipidemia, obesity and post-menopausal    Fatigue  This is a new problem  The current episode started more than 1 month ago  The problem occurs constantly  The problem has been unchanged  Associated symptoms include fatigue  Pertinent negatives include no abdominal pain, arthralgias, chest pain, chills, congestion, coughing, fever, headaches, joint swelling, myalgias, nausea, neck pain, numbness, rash, sore throat or weakness  Nothing aggravates the symptoms  She has tried rest, sleep and relaxation for the symptoms  The treatment provided no relief  Review of Systems   Constitutional: Positive for fatigue  Negative for chills, fever and unexpected weight change  HENT: Negative for congestion, ear pain, hearing loss, postnasal drip, sinus pressure, sore throat, trouble swallowing and voice change  Eyes: Negative for visual disturbance  Respiratory: Negative for cough, chest tightness, shortness of breath and wheezing  Cardiovascular: Negative for chest pain, palpitations and leg swelling  Gastrointestinal: Negative for abdominal distention, abdominal pain, anal bleeding, blood in stool, constipation, diarrhea and nausea  Difficulty swallowing big pills   Endocrine: Negative for cold intolerance, polydipsia, polyphagia and polyuria  Genitourinary: Negative for dysuria, flank pain, frequency, hematuria and urgency  Musculoskeletal: Negative for arthralgias, back pain, gait problem, joint swelling, myalgias and neck pain  Skin: Negative for rash  Allergic/Immunologic: Negative for immunocompromised state  Neurological: Negative for dizziness, focal weakness, syncope, facial asymmetry, weakness, light-headedness, numbness and headaches  Hematological: Negative for adenopathy  Psychiatric/Behavioral: Positive for dysphoric mood  Negative for confusion, sleep disturbance and suicidal ideas  Objective:      /70 (BP Location: Left arm, Patient Position: Sitting, Cuff Size: Standard)   Pulse 94   Temp (!) 96 4 °F (35 8 °C)   Ht 5' 4" (1 626 m)   Wt 87 5 kg (193 lb)   SpO2 98%   BMI 33 13 kg/m²          Physical Exam  Constitutional:       General: She is not in acute distress  Appearance: She is well-developed  She is obese  HENT:      Right Ear: External ear normal       Left Ear: External ear normal       Nose: Nose normal       Mouth/Throat:      Pharynx: No oropharyngeal exudate  Eyes:      Pupils: Pupils are equal, round, and reactive to light  Neck:      Musculoskeletal: Normal range of motion and neck supple  Thyroid: No thyromegaly  Vascular: No JVD  Cardiovascular:      Rate and Rhythm: Normal rate and regular rhythm  Heart sounds: Normal heart sounds  No murmur  No gallop  Pulmonary:      Effort: Pulmonary effort is normal  No respiratory distress  Breath sounds: Normal breath sounds  No wheezing or rales  Abdominal:      General: Bowel sounds are normal  There is no distension  Palpations: Abdomen is soft   There is no mass  Tenderness: There is no abdominal tenderness  Musculoskeletal: Normal range of motion  General: No tenderness  Lymphadenopathy:      Cervical: No cervical adenopathy  Skin:     Capillary Refill: Capillary refill takes less than 2 seconds  Findings: No rash  Neurological:      Mental Status: She is alert and oriented to person, place, and time  Cranial Nerves: No cranial nerve deficit  Coordination: Coordination normal    Psychiatric:         Mood and Affect: Mood normal          Behavior: Behavior normal          Thought Content: Thought content normal          Judgment: Judgment normal          BMI Counseling: Body mass index is 33 13 kg/m²  The BMI is above normal  Nutrition recommendations include consuming healthier snacks

## 2021-01-27 NOTE — PATIENT INSTRUCTIONS
Obesity   AMBULATORY CARE:   Obesity  is when your body mass index (BMI) is greater than 30  Your healthcare provider will use your height and weight to measure your BMI  The risks of obesity include  many health problems, such as injuries or physical disability  You may need tests to check for the following:  · Diabetes    · High blood pressure or high cholesterol    · Heart disease    · Gallbladder or liver disease    · Cancer of the colon, breast, prostate, liver, or kidney    · Sleep apnea    · Arthritis or gout    Seek care immediately if:   · You have a severe headache, confusion, or difficulty speaking  · You have weakness on one side of your body  · You have chest pain, sweating, or shortness of breath  Contact your healthcare provider if:   · You have symptoms of gallbladder or liver disease, such as pain in your upper abdomen  · You have knee or hip pain and discomfort while walking  · You have symptoms of diabetes, such as intense hunger and thirst, and frequent urination  · You have symptoms of sleep apnea, such as snoring or daytime sleepiness  · You have questions or concerns about your condition or care  Treatment for obesity  focuses on helping you lose weight to improve your health  Even a small decrease in BMI can reduce the risk for many health problems  Your healthcare provider will help you set a weight-loss goal   · Lifestyle changes  are the first step in treating obesity  These include making healthy food choices and getting regular physical activity  Your healthcare provider may suggest a weight-loss program that involves coaching, education, and therapy  · Medicine  may help you lose weight when it is used with a healthy diet and physical activity  · Surgery  can help you lose weight if you are very obese and have other health problems  There are several types of weight-loss surgery  Ask your healthcare provider for more information      Be successful losing weight:   · Set small, realistic goals  An example of a small goal is to walk for 20 minutes 5 days a week  Anther goal is to lose 5% of your body weight  · Tell friends, family members, and coworkers about your goals  and ask for their support  Ask a friend to lose weight with you, or join a weight-loss support group  · Identify foods or triggers that may cause you to overeat , and find ways to avoid them  Remove tempting high-calorie foods from your home and workplace  Place a bowl of fresh fruit on your kitchen counter  If stress causes you to eat, then find other ways to cope with stress  · Keep a diary to track what you eat and drink  Also write down how many minutes of physical activity you do each day  Weigh yourself once a week and record it in your diary  Eating changes: You will need to eat 500 to 1,000 fewer calories each day than you currently eat to lose 1 to 2 pounds a week  The following changes will help you cut calories:  · Eat smaller portions  Use small plates, no larger than 9 inches in diameter  Fill your plate half full of fruits and vegetables  Measure your food using measuring cups until you know what a serving size looks like  · Eat 3 meals and 1 or 2 snacks each day  Plan your meals in advance  Priyanka Meals and eat at home most of the time  Eat slowly  Do not skip meals  Skipping meals can lead to overeating later in the day  This can make it harder for you to lose weight  Talk with a dietitian to help you make a meal plan and schedule that is right for you  · Eat fruits and vegetables at every meal   They are low in calories and high in fiber, which makes you feel full  Do not add butter, margarine, or cream sauce to vegetables  Use herbs to season steamed vegetables  · Eat less fat and fewer fried foods  Eat more baked or grilled chicken and fish  These protein sources are lower in calories and fat than red meat  Limit fast food   Dress your salads with olive oil and vinegar instead of bottled dressing  · Limit the amount of sugar you eat  Do not drink sugary beverages  Limit alcohol  Activity changes:  Physical activity is good for your body in many ways  It helps you burn calories and build strong muscles  It decreases stress and depression, and improves your mood  It can also help you sleep better  Talk to your healthcare provider before you begin an exercise program   · Exercise for at least 30 minutes 5 days a week  Start slowly  Set aside time each day for physical activity that you enjoy and that is convenient for you  It is best to do both weight training and an activity that increases your heart rate, such as walking, bicycling, or swimming  · Find ways to be more active  Do yard work and housecleaning  Walk up the stairs instead of using elevators  Spend your leisure time going to events that require walking, such as outdoor festivals or fairs  This extra physical activity can help you lose weight and keep it off  Follow up with your healthcare provider as directed: You may need to meet with a dietitian  Write down your questions so you remember to ask them during your visits  © Copyright [address] [place] [address] Drive Information is for End User's use only and may not be sold, redistributed or otherwise used for commercial purposes  All illustrations and images included in CareNotes® are the copyrighted property of A D A M , Inc  or [facility] Tatiana roge   The above information is an  only  It is not intended as medical advice for individual conditions or treatments  Talk to your doctor, nurse or pharmacist before following any medical regimen to see if it is safe and effective for you  Low Fat Diet   AMBULATORY CARE:   A low-fat diet  is an eating plan that is low in total fat, unhealthy fat, and cholesterol  You may need to follow a low-fat diet if you have trouble digesting or absorbing fat   You may also need to follow this diet if you have high cholesterol  You can also lower your cholesterol by increasing the amount of fiber in your diet  Soluble fiber is a type of fiber that helps to decrease cholesterol levels  Different types of fat in food:   · Limit unhealthy fats  A diet that is high in cholesterol, saturated fat, and trans fat may cause unhealthy cholesterol levels  Unhealthy cholesterol levels increase your risk of heart disease  ? Cholesterol:  Limit intake of cholesterol to less than 200 mg per day  Cholesterol is found in meat, eggs, and dairy  ? Saturated fat:  Limit saturated fat to less than 7% of your total daily calories  Ask your dietitian how many calories you need each day  Saturated fat is found in butter, cheese, ice cream, whole milk, and palm oil  Saturated fat is also found in meat, such as beef, pork, chicken skin, and processed meats  Processed meats include sausage, hot dogs, and bologna  ? Trans fat:  Avoid trans fat as much as possible  Trans fat is used in fried and baked foods  Foods that say trans fat free on the label may still have up to 0 5 grams of trans fat per serving  · Include healthy fats  Replace foods that are high in saturated and trans fat with foods high in healthy fats  This may help to decrease high cholesterol levels  ? Monounsaturated fats: These are found in avocados, nuts, and vegetable oils, such as olive, canola, and sunflower oil  ? Polyunsaturated fats: These can be found in vegetable oils, such as soybean or corn oil  Omega-3 fats can help to decrease the risk of heart disease  Omega-3 fats are found in fish, such as salmon, herring, trout, and tuna  Omega-3 fats can also be found in plant foods, such as walnuts, flaxseed, soybeans, and canola oil  Foods to limit or avoid:   · Grains:      ? Snacks that are made with partially hydrogenated oils, such as chips, regular crackers, and butter-flavored popcorn    ?  High-fat baked goods, such as biscuits, croissants, doughnuts, pies, cookies, and pastries    · Dairy:      ? Whole milk, 2% milk, and yogurt and ice cream made with whole milk    ? Half and half creamer, heavy cream, and whipping cream    ? Cheese, cream cheese, and sour cream    · Meats and proteins:      ? High-fat cuts of meat (T-bone steak, regular hamburger, and ribs)    ? Fried meat, poultry (turkey and chicken), and fish    ? Poultry (chicken and turkey) with skin    ? Cold cuts (salami or bologna), hot dogs, meyer, and sausage    ? Whole eggs and egg yolks    · Vegetables and fruits with added fat:      ? Fried vegetables or vegetables in butter or high-fat sauces, such as cream or cheese sauces    ? Fried fruit or fruit served with butter or cream    · Fats:      ? Butter, stick margarine, and shortening    ? Coconut, palm oil, and palm kernel oil    Foods to include:   · Grains:      ? Whole-grain breads, cereals, pasta, and brown rice    ? Low-fat crackers and pretzels    · Vegetables and fruits:      ? Fresh, frozen, or canned vegetables (no salt or low-sodium)    ? Fresh, frozen, dried, or canned fruit (canned in light syrup or fruit juice)    ? Avocado    · Low-fat dairy products:      ? Nonfat (skim) or 1% milk    ? Nonfat or low-fat cheese, yogurt, and cottage cheese    · Meats and proteins:      ? Chicken or turkey with no skin    ? Baked or broiled fish    ? Lean beef and pork (loin, round, extra lean hamburger)    ? Beans and peas, unsalted nuts, soy products    ? Egg whites and substitutes    ? Seeds and nuts    · Fats:      ? Unsaturated oil, such as canola, olive, peanut, soybean, or sunflower oil    ? Soft or liquid margarine and vegetable oil spread    ? Low-fat salad dressing    Other ways to decrease fat:   · Read food labels before you buy foods  Choose foods that have less than 30% of calories from fat  Choose low-fat or fat-free dairy products  Remember that fat free does not mean calorie free   These foods still contain calories, and too many calories can lead to weight gain  · Trim fat from meat and avoid fried food  Trim all visible fat from meat before you cook it  Remove the skin from poultry  Do not nair meat, fish, or poultry  Bake, roast, boil, or broil these foods instead  Avoid fried foods  Eat a baked potato instead of [place] fries  Steam vegetables instead of sautéing them in butter  · Add less fat to foods  Use imitation meyer bits on salads and baked potatoes instead of regular meyer bits  Use fat-free or low-fat salad dressings instead of regular dressings  Use low-fat or nonfat butter-flavored topping instead of regular butter or margarine on popcorn and other foods  Ways to decrease fat in recipes:  Replace high-fat ingredients with low-fat or nonfat ones  This may cause baked goods to be drier than usual  You may need to use nonfat cooking spray on pans to prevent food from sticking  You also may need to change the amount of other ingredients, such as water, in the recipe  Try the following:  · Use low-fat or light margarine instead of regular margarine or shortening  · Use lean ground turkey breast or chicken, or lean ground beef (less than 5% fat) instead of hamburger  · Add 1 teaspoon of canola oil to 8 ounces of skim milk instead of using cream or half and half  · Use grated zucchini, carrots, or apples in breads instead of coconut  · Use blenderized, low-fat cottage cheese, plain tofu, or low-fat ricotta cheese instead of cream cheese  · Use 1 egg white and 1 teaspoon of canola oil, or use ¼ cup (2 ounces) of fat-free egg substitute instead of a whole egg  · Replace half of the oil that is called for in a recipe with applesauce when you bake  Use 3 tablespoons of cocoa powder and 1 tablespoon of canola oil instead of a square of baking chocolate  How to increase fiber:  Eat enough high-fiber foods to get 20 to 30 grams of fiber every day   Slowly increase your fiber intake to avoid stomach cramps, gas, and other problems  · Eat 3 ounces of whole-grain foods each day  An ounce is about 1 slice of bread  Eat whole-grain breads, such as whole-wheat bread  Whole wheat, whole-wheat flour, or other whole grains should be listed as the first ingredient on the food label  Replace white flour with whole-grain flour or use half of each in recipes  Whole-grain flour is heavier than white flour, so you may have to add more yeast or baking powder  · Eat a high-fiber cereal for breakfast   Oatmeal is a good source of soluble fiber  Look for cereals that have bran or fiber in the name  Choose whole-grain products, such as brown rice, barley, and whole-wheat pasta  · Eat more beans, peas, and lentils  For example, add beans to soups or salads  Eat at least 5 cups of fruits and vegetables each day  Eat fruits and vegetables with the peel because the peel is high in fiber  © Copyright [address] Information is for End User's use only and may not be sold, redistributed or otherwise used for commercial purposes  All illustrations and images included in CareNotes® are the copyrighted property of [employer] D A M , Inc  or [facility]  The above information is an  only  It is not intended as medical advice for individual conditions or treatments  Talk to your doctor, nurse or pharmacist before following any medical regimen to see if it is safe and effective for you  Heart Healthy Diet   AMBULATORY CARE:   A heart healthy diet  is an eating plan low in unhealthy fats and sodium (salt)  The plan is high in healthy fats and fiber  A heart healthy diet helps improve your cholesterol levels and lowers your risk for heart disease and stroke  A dietitian will teach you how to read and understand food labels  Heart healthy diet guidelines to follow:   · Choose foods that contain healthy fats  ? Unsaturated fats  include monounsaturated and polyunsaturated fats  Unsaturated fat is found in foods such as soybean, canola, olive, corn, and safflower oils  It is also found in soft tub margarine that is made with liquid vegetable oil  ? Omega-3 fat  is found in certain fish, such as salmon, tuna, and trout, and in walnuts and flaxseed  Eat fish high in omega-3 fats at least 2 times a week  · Get 20 to 30 grams of fiber each day  Fruits, vegetables, whole-grain foods, and legumes (cooked beans) are good sources of fiber  · Limit or do not have unhealthy fats  ? Cholesterol  is found in animal foods, such as eggs and lobster, and in dairy products made from whole milk  Limit cholesterol to less than 200 mg each day  ? Saturated fat  is found in meats, such as meyer and hamburger  It is also found in chicken or turkey skin, whole milk, and butter  Limit saturated fat to less than 7% of your total daily calories  ? Trans fat  is found in packaged foods, such as potato chips and cookies  It is also in hard margarine, some fried foods, and shortening  Do not eat foods that contain trans fats  · Limit sodium as directed  You may be told to limit sodium to 2,000 to 2,300 mg each day  Choose low-sodium or no-salt-added foods  Add little or no salt to food you prepare  Use herbs and spices in place of salt  Include the following in your heart healthy plan:  Ask your dietitian or healthcare provider how many servings to have from each of the following food groups:  · Grains:      ? Whole-wheat breads, cereals, and pastas, and brown rice    ? Low-fat, low-sodium crackers and chips    · Vegetables:      ? Broccoli, green beans, green peas, and spinach    ? Collards, kale, and lima beans    ? Carrots, sweet potatoes, tomatoes, and peppers    ? Canned vegetables with no salt added    · Fruits:      ? Bananas, peaches, pears, and pineapple    ? Grapes, raisins, and dates    ?  Oranges, tangerines, grapefruit, orange juice, and grapefruit juice    ? Apricots, mangoes, melons, and papaya    ? Raspberries and strawberries    ? Canned fruit with no added sugar    · Low-fat dairy:      ? Nonfat (skim) milk, 1% milk, and low-fat almond, cashew, or soy milks fortified with calcium    ? Low-fat cheese, regular or frozen yogurt, and cottage cheese    · Meats and proteins:      ? Lean cuts of beef and pork (loin, leg, round), skinless chicken and turkey    ? Legumes, soy products, egg whites, or nuts    Limit or do not include the following in your heart healthy plan:   · Unhealthy fats and oils:      ? Whole or 2% milk, cream cheese, sour cream, or cheese    ? High-fat cuts of beef (T-bone steaks, ribs), chicken or turkey with skin, and organ meats such as liver    ? Butter, stick margarine, shortening, and cooking oils such as coconut or palm oil    · Foods and liquids high in sodium:      ? Packaged foods, such as frozen dinners, cookies, macaroni and cheese, and cereals with more than 300 mg of sodium per serving    ? Vegetables with added sodium, such as instant potatoes, vegetables with added sauces, or regular canned vegetables    ? Cured or smoked meats, such as hot dogs, meyer, and sausage    ? High-sodium ketchup, barbecue sauce, salad dressing, pickles, olives, soy sauce, or miso    · Foods and liquids high in sugar:      ? Candy, cake, cookies, pies, or doughnuts    ? Soft drinks (soda), sports drinks, or sweetened tea    ? Canned or dry mixes for cakes, soups, sauces, or gravies    Other healthy heart guidelines:   · Do not smoke  Nicotine and other chemicals in cigarettes and cigars can cause lung and heart damage  Ask your healthcare provider for information if you currently smoke and need help to quit  E-cigarettes or smokeless tobacco still contain nicotine  Talk to your healthcare provider before you use these products  · Limit or do not drink alcohol as directed  Alcohol can damage your heart and raise your blood pressure   Your healthcare provider may give you specific daily and weekly limits  The general recommended limit is 1 drink a day for women 21 or older and for men 72 or older  Do not have more than 3 drinks in a day or 7 in a week  The recommended limit is 2 drinks a day for men 24to 59years of age  Do not have more than 4 drinks in a day or 14 in a week  A drink of alcohol is 12 ounces of beer, 5 ounces of wine, or 1½ ounces of liquor  · Exercise regularly  Exercise can help you maintain a healthy weight and improve your blood pressure and cholesterol levels  Regular exercise can also decrease your risk for heart problems  Ask your healthcare provider about the best exercise plan for you  Do not start an exercise program without asking your healthcare provider  Follow up with your doctor or cardiologist as directed:  Write down your questions so you remember to ask them during your visits  © Copyright [facility] [address] Information is for End User's use only and may not be sold, redistributed or otherwise used for commercial purposes  All illustrations and images included in CareNotes® are the copyrighted property of A D A M , Inc  or [facility] Tatiana Mendoza   The above information is an  only  It is not intended as medical advice for individual conditions or treatments  Talk to your doctor, nurse or pharmacist before following any medical regimen to see if it is safe and effective for you

## 2021-01-27 NOTE — ASSESSMENT & PLAN NOTE
Currently her grandmother is dying from colon cancer and would like to get colonoscopy so will refer to GI    Hopefully they also do an upper GI secondary to some difficulty swallowing

## 2021-01-27 NOTE — ASSESSMENT & PLAN NOTE
Her big complaint today is fatigue    She states she sleeps well is physically active with her grandkids and recently had full blood work by her psychiatrist   It is possibly due to the underlying stress from the [place] pandemic but she has had cyst tend to change her medicines since otherwise she is doing well

## 2021-01-27 NOTE — ASSESSMENT & PLAN NOTE
She is mildly overweight and continues to try and follow a diet but admits that she is not doing so well since COVID

## 2021-02-14 ENCOUNTER — TRANSCRIBE ORDERS (OUTPATIENT)
Dept: ADMINISTRATIVE | Age: 51
End: 2021-02-14

## 2021-02-14 ENCOUNTER — LAB (OUTPATIENT)
Dept: LAB | Age: 51
End: 2021-02-14
Payer: COMMERCIAL

## 2021-02-14 DIAGNOSIS — Z79.899 ENCOUNTER FOR LONG-TERM (CURRENT) USE OF OTHER MEDICATIONS: Primary | ICD-10-CM

## 2021-02-14 DIAGNOSIS — Z79.899 ENCOUNTER FOR LONG-TERM (CURRENT) USE OF OTHER MEDICATIONS: ICD-10-CM

## 2021-02-14 LAB — LITHIUM SERPL-SCNC: 0.9 MMOL/L (ref 0.5–1)

## 2021-02-14 PROCEDURE — 36415 COLL VENOUS BLD VENIPUNCTURE: CPT

## 2021-02-14 PROCEDURE — 80178 ASSAY OF LITHIUM: CPT

## 2021-02-16 ENCOUNTER — CONSULT (OUTPATIENT)
Dept: SURGERY | Facility: CLINIC | Age: 51
End: 2021-02-16

## 2021-02-16 VITALS — HEIGHT: 65 IN | WEIGHT: 196 LBS | BODY MASS INDEX: 32.65 KG/M2 | TEMPERATURE: 97.8 F

## 2021-02-16 DIAGNOSIS — L03.90 CELLULITIS: Primary | ICD-10-CM

## 2021-02-16 DIAGNOSIS — Z12.11 SCREEN FOR COLON CANCER: ICD-10-CM

## 2021-02-16 PROCEDURE — 1036F TOBACCO NON-USER: CPT | Performed by: SURGERY

## 2021-02-16 PROCEDURE — 99242 OFF/OP CONSLTJ NEW/EST SF 20: CPT | Performed by: SURGERY

## 2021-02-16 PROCEDURE — 3008F BODY MASS INDEX DOCD: CPT | Performed by: SURGERY

## 2021-02-16 RX ORDER — CEPHALEXIN 500 MG/1
500 CAPSULE ORAL 4 TIMES DAILY
Qty: 28 CAPSULE | Refills: 0 | Status: SHIPPED | OUTPATIENT
Start: 2021-02-16 | End: 2021-02-23

## 2021-02-16 NOTE — PROGRESS NOTES
Assessment/Plan:  Patient is a 61-year-old female presents for possible screening colonoscopy  She is a family history of colon carcinoma through her grandmother  I explained to her that this should not increase her risk genetically  It would be first-degree relatives  GI review of systems is unremarkable  Denies any blood in her stool denies any abdominal pain  I recommended Hemoccult slides for now  A screening colonoscopy is recommended on her breast date in August   A bowel preparation taking a mind of her gastric bypass surgery was discussed  She has a rash at her umbilicus  Cellulitis versus fungal within the differential   Keflex and nystatin recommended  Patient is agreeable  Diagnoses and all orders for this visit:    Cellulitis  -     cephalexin (KEFLEX) 500 mg capsule; Take 1 capsule (500 mg total) by mouth 4 (four) times a day for 7 days    Screen for colon cancer        Subjective:      Patient ID: Tripp Shows is a 53 y o  female  Patient presents for pre colonoscopy consult  The following portions of the patient's history were reviewed and updated as appropriate:     She  has a past medical history of Anemia, Anxiety, Bipolar disorder ([address] ), Dental cavity, Depression, GERD (gastroesophageal reflux disease), History of bariatric surgery, Hypoglycemia, Morbid obesity ([address] ), Nausea, PCOS (polycystic ovarian syndrome), Shortness of breath, Sleep apnea, Stomach ulcer, and Tooth loose  She  has a past surgical history that includes Cholecystectomy; Elliott tooth extraction; Upper gastrointestinal endoscopy; pr egd transoral biopsy single/multiple (N/A, 10/4/2017); Bariatric Surgery; BODY LIFT; pr lap gastric bypass/ivanna-en-y (N/A, 1/24/2018); Esophagogastroduodenoscopy (N/A, 1/24/2018); and Panniculectomy    Her family history includes Bipolar disorder in her family, maternal grandmother, and mother; Breast cancer in her maternal grandmother; Diabetes in her family, maternal grandmother, and mother; Hypertension in her father; Ovarian cancer in her family and maternal grandmother  She  reports that she quit smoking about 28 years ago  Her smoking use included cigarettes  She has a 2 25 pack-year smoking history  She has never used smokeless tobacco  She reports that she does not drink alcohol or use drugs  Current Outpatient Medications   Medication Sig Dispense Refill    acetaminophen (TYLENOL) 500 mg tablet Take 500 mg by mouth      amoxicillin (AMOXIL) 500 MG tablet TAKE 1 TABLET BY MOUTH EVERY 8 HOURS UNTIL GONE      cephalexin (KEFLEX) 500 mg capsule Take 1 capsule (500 mg total) by mouth 4 (four) times a day for 7 days 28 capsule 0    Cholecalciferol (VITAMIN D) 50 MCG (2000 UT) tablet Take by mouth      Cyanocobalamin (VITAMIN B-12 SL) Place under the tongue daily      hydrOXYzine pamoate (VISTARIL) 50 mg capsule Take 50 mg by mouth 2 (two) times a day      lithium carbonate 150 mg capsule Take 150 mg by mouth every morning      lithium carbonate 300 mg capsule Take 900 mg by mouth daily at bedtime      Multiple Vitamin (MULTIVITAMIN) capsule Take 1 capsule by mouth daily      PREVIDENT 5000 BOOSTER PLUS 1 1 % PSTE USE AS DIRECTED BY DOCTOR  5    QUEtiapine (SEROquel) 100 mg tablet Take 100 mg by mouth daily at bedtime      rOPINIRole (REQUIP) 2 mg tablet Take 2 mg by mouth daily at bedtime      topiramate (TOPAMAX) 25 mg tablet TAKE 3 TABLETS BY MOUTH THREE TIMES DAILY       No current facility-administered medications for this visit  She is allergic to percocet [oxycodone-acetaminophen] and latex       Review of Systems   Constitutional: Positive for fatigue and unexpected weight change  Negative for activity change  HENT: Positive for dental problem  Eyes: Negative  Respiratory: Negative  Cardiovascular: Negative  Gastrointestinal: Positive for diarrhea  Negative for anal bleeding and blood in stool  Endocrine: Negative  Genitourinary: Negative  Musculoskeletal: Negative  Skin: Negative  Allergic/Immunologic: Negative  Neurological: Negative  Psychiatric/Behavioral: Positive for agitation  Negative for behavioral problems and confusion  The patient is nervous/anxious  All other systems reviewed and are negative  Objective:      Temp 97 8 °F (36 6 °C)   Ht 5' 4 75" (1 645 m)   Wt 88 9 kg (196 lb)   BMI 32 87 kg/m²          Physical Exam  Constitutional:       Appearance: Normal appearance  She is well-developed  HENT:      Head: Normocephalic and atraumatic  Nose: Nose normal    Eyes:      Extraocular Movements: Extraocular movements intact  Conjunctiva/sclera: Conjunctivae normal    Neck:      Musculoskeletal: Normal range of motion  Cardiovascular:      Rate and Rhythm: Normal rate and regular rhythm  Heart sounds: Normal heart sounds  Pulmonary:      Effort: Pulmonary effort is normal       Breath sounds: Normal breath sounds  Abdominal:      General: Abdomen is flat  Musculoskeletal: Normal range of motion  Skin:     General: Skin is warm and dry  Comments: Mild umbilicus  Blanching present  Cellulitis versus fungal rash  Neurological:      Mental Status: She is alert and oriented to person, place, and time     Psychiatric:         Mood and Affect: Mood normal

## 2021-02-23 ENCOUNTER — TREATMENT (OUTPATIENT)
Dept: SURGERY | Facility: CLINIC | Age: 51
End: 2021-02-23

## 2021-05-26 ENCOUNTER — PREP FOR PROCEDURE (OUTPATIENT)
Dept: SURGERY | Facility: CLINIC | Age: 51
End: 2021-05-26

## 2021-05-26 DIAGNOSIS — Z12.11 ENCOUNTER FOR SCREENING COLONOSCOPY: Primary | ICD-10-CM

## 2021-09-01 ENCOUNTER — TELEPHONE (OUTPATIENT)
Dept: PREADMISSION TESTING | Facility: HOSPITAL | Age: 51
End: 2021-09-01

## 2021-09-08 ENCOUNTER — ANESTHESIA EVENT (OUTPATIENT)
Dept: ANESTHESIOLOGY | Facility: HOSPITAL | Age: 51
End: 2021-09-08

## 2021-09-08 ENCOUNTER — TELEPHONE (OUTPATIENT)
Dept: GASTROENTEROLOGY | Facility: HOSPITAL | Age: 51
End: 2021-09-08

## 2021-09-08 ENCOUNTER — ANESTHESIA (OUTPATIENT)
Dept: ANESTHESIOLOGY | Facility: HOSPITAL | Age: 51
End: 2021-09-08

## 2021-09-08 RX ORDER — SODIUM CHLORIDE, SODIUM LACTATE, POTASSIUM CHLORIDE, CALCIUM CHLORIDE 600; 310; 30; 20 MG/100ML; MG/100ML; MG/100ML; MG/100ML
125 INJECTION, SOLUTION INTRAVENOUS CONTINUOUS
Status: CANCELLED | OUTPATIENT
Start: 2021-09-08

## 2021-09-08 RX ORDER — LIDOCAINE HYDROCHLORIDE 10 MG/ML
0.5 INJECTION, SOLUTION EPIDURAL; INFILTRATION; INTRACAUDAL; PERINEURAL ONCE AS NEEDED
Status: CANCELLED | OUTPATIENT
Start: 2021-09-08

## 2021-09-08 NOTE — ANESTHESIA PREPROCEDURE EVALUATION
Procedure:  PRE-OP ONLY    Relevant Problems   GI/HEPATIC   (+) History of bariatric surgery      NEURO/PSYCH   (+) Episode of recurrent major depressive disorder (HCC)      PULMONARY   (+) Obstructive sleep apnea syndrome      Other   (+) BMI 33 0-33 9,adult   (+) Bipolar disorder (HCC)   (+) Screen for colon cancer             Anesthesia Plan  ASA Score- 3     Anesthesia Type- IV sedation with anesthesia with ASA Monitors  Additional Monitors:   Airway Plan:           Plan Factors-    Chart reviewed  Patient is not a current smoker  Patient not instructed to abstain from smoking on day of procedure  Patient did not smoke on day of surgery  Induction- intravenous  Postoperative Plan-     Informed Consent- Anesthetic plan and risks discussed with patient  I personally reviewed this patient with the CRNA  Discussed and agreed on the Anesthesia Plan with the CRNA  Truman Hunter

## 2021-09-09 ENCOUNTER — HOSPITAL ENCOUNTER (OUTPATIENT)
Dept: GASTROENTEROLOGY | Facility: HOSPITAL | Age: 51
Setting detail: OUTPATIENT SURGERY
Discharge: HOME | End: 2021-09-09
Attending: SURGERY | Admitting: SURGERY
Payer: COMMERCIAL

## 2021-09-09 ENCOUNTER — ANESTHESIA EVENT (OUTPATIENT)
Dept: GASTROENTEROLOGY | Facility: HOSPITAL | Age: 51
End: 2021-09-09

## 2021-09-09 ENCOUNTER — ANESTHESIA (OUTPATIENT)
Dept: GASTROENTEROLOGY | Facility: HOSPITAL | Age: 51
End: 2021-09-09

## 2021-09-09 VITALS
DIASTOLIC BLOOD PRESSURE: 69 MMHG | BODY MASS INDEX: 31.34 KG/M2 | HEIGHT: 64 IN | SYSTOLIC BLOOD PRESSURE: 108 MMHG | WEIGHT: 183.6 LBS | TEMPERATURE: 98.3 F | OXYGEN SATURATION: 98 % | RESPIRATION RATE: 16 BRPM | HEART RATE: 77 BPM

## 2021-09-09 DIAGNOSIS — Z12.11 ENCOUNTER FOR SCREENING COLONOSCOPY: ICD-10-CM

## 2021-09-09 PROCEDURE — 88305 TISSUE EXAM BY PATHOLOGIST: CPT | Performed by: PATHOLOGY

## 2021-09-09 PROCEDURE — 45384 COLONOSCOPY W/LESION REMOVAL: CPT | Performed by: SURGERY

## 2021-09-09 RX ORDER — SODIUM CHLORIDE, SODIUM LACTATE, POTASSIUM CHLORIDE, CALCIUM CHLORIDE 600; 310; 30; 20 MG/100ML; MG/100ML; MG/100ML; MG/100ML
INJECTION, SOLUTION INTRAVENOUS CONTINUOUS PRN
Status: DISCONTINUED | OUTPATIENT
Start: 2021-09-09 | End: 2021-09-09

## 2021-09-09 RX ORDER — SODIUM CHLORIDE, SODIUM LACTATE, POTASSIUM CHLORIDE, CALCIUM CHLORIDE 600; 310; 30; 20 MG/100ML; MG/100ML; MG/100ML; MG/100ML
125 INJECTION, SOLUTION INTRAVENOUS CONTINUOUS
Status: DISCONTINUED | OUTPATIENT
Start: 2021-09-09 | End: 2021-09-13 | Stop reason: HOSPADM

## 2021-09-09 RX ORDER — PROPOFOL 10 MG/ML
INJECTION, EMULSION INTRAVENOUS AS NEEDED
Status: DISCONTINUED | OUTPATIENT
Start: 2021-09-09 | End: 2021-09-09

## 2021-09-09 RX ORDER — BUSPIRONE HYDROCHLORIDE 5 MG/1
5 TABLET ORAL 3 TIMES DAILY
COMMUNITY

## 2021-09-09 RX ORDER — LIDOCAINE HYDROCHLORIDE 10 MG/ML
0.5 INJECTION, SOLUTION EPIDURAL; INFILTRATION; INTRACAUDAL; PERINEURAL ONCE AS NEEDED
Status: DISCONTINUED | OUTPATIENT
Start: 2021-09-09 | End: 2021-09-13 | Stop reason: HOSPADM

## 2021-09-09 RX ADMIN — PROPOFOL 20 MG: 10 INJECTION, EMULSION INTRAVENOUS at 14:09

## 2021-09-09 RX ADMIN — PROPOFOL 100 MG: 10 INJECTION, EMULSION INTRAVENOUS at 13:55

## 2021-09-09 RX ADMIN — PROPOFOL 20 MG: 10 INJECTION, EMULSION INTRAVENOUS at 14:03

## 2021-09-09 RX ADMIN — SODIUM CHLORIDE, SODIUM LACTATE, POTASSIUM CHLORIDE, AND CALCIUM CHLORIDE: .6; .31; .03; .02 INJECTION, SOLUTION INTRAVENOUS at 13:51

## 2021-09-09 RX ADMIN — PROPOFOL 20 MG: 10 INJECTION, EMULSION INTRAVENOUS at 14:06

## 2021-09-09 RX ADMIN — PROPOFOL 20 MG: 10 INJECTION, EMULSION INTRAVENOUS at 13:59

## 2021-09-09 RX ADMIN — PROPOFOL 20 MG: 10 INJECTION, EMULSION INTRAVENOUS at 14:01

## 2021-09-09 RX ADMIN — PROPOFOL 20 MG: 10 INJECTION, EMULSION INTRAVENOUS at 13:57

## 2021-09-09 NOTE — ANESTHESIA PREPROCEDURE EVALUATION
Procedure:  COLONOSCOPY    Relevant Problems   GI/HEPATIC   (+) History of bariatric surgery      NEURO/PSYCH   (+) Episode of recurrent major depressive disorder (HCC)      PULMONARY   (+) Obstructive sleep apnea syndrome      No longer an issue  Physical Exam    Airway    Mallampati score: I  TM Distance: >3 FB  Neck ROM: full     Dental   No notable dental hx     Cardiovascular      Pulmonary      Other Findings        Anesthesia Plan  ASA Score- 3     Anesthesia Type- IV sedation with anesthesia with ASA Monitors  Additional Monitors:   Airway Plan:           Plan Factors-Exercise tolerance (METS): >4 METS  Chart reviewed  Patient is not a current smoker  Patient not instructed to abstain from smoking on day of procedure  Patient did not smoke on day of surgery  Induction- intravenous  Postoperative Plan-     Informed Consent- Anesthetic plan and risks discussed with patient  I personally reviewed this patient with the CRNA  Discussed and agreed on the Anesthesia Plan with the CRNA  Jessica Rowan

## 2021-09-09 NOTE — PROGRESS NOTES
-General Surgical Care   Goreg Kim 49 y o  female MRN: [id number]  Unit/Bed#:  Encounter: [id number]        ASSESSMENT:  Screening colonoscopy    PLAN:  Colonoscopy    Review of Systems  Constitutional:  Denies fever or chills   Eyes:  Denies change in visual acuity   HENT:  Denies nasal congestion or sore throat   Respiratory:  Denies cough or shortness of breath   Cardiovascular:  Denies chest pain or edema   GI:  Denies abdominal pain, nausea, vomiting, bloody stools or diarrhea   Musculoskeletal:  Denies back pain or joint pain   Integument:  Denies rash   Neurologic:  Denies headache, focal weakness or sensory changes   Endocrine:  Denies polyuria or polydipsia   Lymphatic:  Denies swollen glands   Psychiatric:  Denies depression or anxiety     Historical Information   Past Medical History:   Diagnosis Date    Anemia     iron deicient    Anxiety     panic disorder    [facility]     Dental cavity     lower left back removed on 1/5/18 and sutures present to be removed 1/12     Depression     GERD (gastroesophageal reflux disease)     History of bariatric surgery     RUEN-Y  10/2006  wt loss 166lb w/ gain now    Hypoglycemia     Morbid [facility]     Nausea     PCOS (polycystic ovarian syndrome)     Shortness of breath     going up stairs    Sleep apnea     "doesn't use machine"    Stomach ulcer     Tooth loose     upper front     Past Surgical History:   Procedure Laterality Date    BARIATRIC SURGERY      approx 11 yrs ago    CHOLECYSTECTOMY      ESOPHAGOGASTRODUODENOSCOPY N/A 1/24/2018    Procedure: INTRAOPERATIVE ESOPHAGOGASTRODUODENOSCOPY (EGD); Surgeon: Renee Walls MD;  Location: [facility];  Service: Bariatrics    PANNICULECTOMY      Last assessed 12/15/2017     MS EGD TRANSORAL BIOPSY SINGLE/MULTIPLE N/A 10/4/2017    Procedure: ESOPHAGOGASTRODUODENOSCOPY (EGD) with biopsy;  Surgeon: Renee Walls MD;  Location: [facility];   Service: Bariatrics    MS LAP GASTRIC BYPASS/REINALDO-EN-Y N/A 2018    Procedure: LAPAROSCOPIC REVISION OF REINALDO-EN-Y GASTRIC BYPASS ROBOTICALLY ASSISTED;  Surgeon: Lore Pizarro MD;  Location: [facility];  Service: Bariatrics    TOTAL BODY LIFT      approx     UPPER GASTROINTESTINAL ENDOSCOPY      WISDOM TOOTH EXTRACTION       Social History   Social History     Substance and Sexual Activity   Alcohol Use No    Comment: recovered alcoholic since 0105     Social History     Substance and Sexual Activity   Drug Use No     Social History     Tobacco Use   Smoking Status Former Smoker    Packs/day: 0 25    Years: 9 00    Pack years: 2 25    Types: Cigarettes    Quit date: 47    Years since quittin 7   Smokeless Tobacco Never Used   Tobacco Comment    quit approx 20 yrs ago     Family History   Problem Relation Age of Onset    Bipolar disorder Mother     Diabetes Mother     Hypertension Father     Bipolar disorder Maternal Grandmother     Diabetes Maternal Grandmother     Breast cancer Maternal Grandmother     Ovarian cancer Maternal Grandmother     Ovarian cancer Family     Diabetes Family     Bipolar disorder Family     Cancer Paternal Grandmother        Meds/Allergies     (Not in a hospital admission)    Current Facility-Administered Medications   Medication Dose Route Frequency    lactated ringers infusion  125 mL/hr Intravenous Continuous    lidocaine (PF) (XYLOCAINE-MPF) 1 % injection 0 5 mL  0 5 mL Infiltration Once PRN       Allergies   Allergen Reactions    Percocet [Oxycodone-Acetaminophen] Shortness Of Breath     "Shallow breathing"    Latex Hives and Rash     rash  Old gloves caused redness,        Objective     Blood pressure 120/82, pulse 85, temperature 98 3 °F (36 8 °C), temperature source Temporal, resp  rate 16, height 5' 4" (1 626 m), weight 83 3 kg (183 lb 9 6 oz), SpO2 98 %, not currently breastfeeding      No intake or output data in the 24 hours ending 21 8088 Pauline Moreland appearance: alert and oriented, in no acute distress  Lungs: clear to auscultation bilaterally  Heart: regular rate and rhythm, S1, S2 normal, no murmur, click, rub or gallop  Abdomen: soft, non-tender; bowel sounds normal; no masses,  no organomegaly  Skin: Skin color, texture, turgor normal  No rashes or lesions    Lab Results:   No visits with results within 1 Day(s) from this visit  Latest known visit with results is:   Lab on 02/14/2021   Component Date Value    Lithium Lvl 02/14/2021 0 9      Imaging Studies: I have personally reviewed pertinent reports  No results found  Counseling / Coordination of Care  Total time spent today  15 minutes  Greater than 50% of total time was spent with the patient and / or family counseling and / or coordination of care

## 2021-09-14 ENCOUNTER — TELEPHONE (OUTPATIENT)
Dept: ADMINISTRATIVE | Facility: OTHER | Age: 51
End: 2021-09-14

## 2021-09-14 NOTE — TELEPHONE ENCOUNTER
Upon review of the In Basket request we were able to locate, review, and update the patient chart as requested for Mammogram     Any additional questions or concerns should be emailed to the Practice Liaisons via [email or web address] email, please do not reply via In Basket      Thank you  Cabrera Kirkland

## 2021-09-14 NOTE — TELEPHONE ENCOUNTER
----- Message from Latesha Ashton MA sent at 9/13/2021  2:25 PM EDT -----  Regarding: care gap request  mammo  09/13/21 2:25 PM    Hello, our patient attached above has had Mammogram completed/performed  Please assist in updating the patient chart by pulling the Care Everywhere (CE) document  The date of service is 07/28/2021       Thank you,  Latesha Ashton MA  [facility]

## 2021-10-22 NOTE — PROGRESS NOTES
Pt offers no complaints today  Here for 1st venofer infusion  A-T Advancement Flap Text: The defect edges were debeveled with a #15c scalpel blade.  Given the location of the defect, shape of the defect and the proximity to free margins an A-T advancement flap was deemed most appropriate.  Using a sterile surgical marker, an appropriate advancement flap was drawn incorporating the defect and placing the expected incisions within the relaxed skin tension lines where possible.    The area thus outlined was incised deep to adipose tissue with a #15 scalpel blade.  The skin margins were undermined to an appropriate distance in all directions utilizing iris scissors.

## 2021-11-29 ENCOUNTER — CLINICAL SUPPORT (OUTPATIENT)
Dept: INTERNAL MEDICINE CLINIC | Age: 51
End: 2021-11-29

## 2021-11-29 DIAGNOSIS — Z20.822 ENCOUNTER FOR LABORATORY TESTING FOR COVID-19 VIRUS: Primary | ICD-10-CM

## 2021-11-29 PROCEDURE — U0003 INFECTIOUS AGENT DETECTION BY NUCLEIC ACID (DNA OR RNA); SEVERE ACUTE RESPIRATORY SYNDROME CORONAVIRUS 2 (SARS-COV-2) (CORONAVIRUS DISEASE [COVID-19]), AMPLIFIED PROBE TECHNIQUE, MAKING USE OF HIGH THROUGHPUT TECHNOLOGIES AS DESCRIBED BY CMS-2020-01-R: HCPCS | Performed by: INTERNAL MEDICINE

## 2021-11-29 PROCEDURE — U0005 INFEC AGEN DETEC AMPLI PROBE: HCPCS | Performed by: INTERNAL MEDICINE

## 2021-11-30 LAB — SARS-COV-2 RNA RESP QL NAA+PROBE: POSITIVE

## 2022-10-12 PROBLEM — Z12.11 SCREEN FOR COLON CANCER: Status: RESOLVED | Noted: 2021-01-27 | Resolved: 2022-10-12

## 2023-06-05 ENCOUNTER — APPOINTMENT (OUTPATIENT)
Dept: LAB | Age: 53
End: 2023-06-05
Payer: COMMERCIAL

## 2023-06-05 DIAGNOSIS — E55.9 VITAMIN D DEFICIENCY: ICD-10-CM

## 2023-06-05 DIAGNOSIS — F41.9 ANXIETY HYPERVENTILATION: ICD-10-CM

## 2023-06-05 DIAGNOSIS — F45.8 ANXIETY HYPERVENTILATION: ICD-10-CM

## 2023-06-05 DIAGNOSIS — D53.9 SIMPLE CHRONIC ANEMIA: ICD-10-CM

## 2023-06-05 DIAGNOSIS — E78.2 MIXED HYPERLIPIDEMIA: ICD-10-CM

## 2023-06-05 DIAGNOSIS — Z00.01 ENCOUNTER FOR GENERAL ADULT MEDICAL EXAMINATION WITH ABNORMAL FINDINGS: ICD-10-CM

## 2023-06-05 DIAGNOSIS — K91.1 POSTGASTRIC SURGERY SYNDROMES: ICD-10-CM

## 2023-06-05 LAB
25(OH)D3 SERPL-MCNC: 49.5 NG/ML (ref 30–100)
ALBUMIN SERPL BCP-MCNC: 3.8 G/DL (ref 3.5–5)
ALP SERPL-CCNC: 63 U/L (ref 46–116)
ALT SERPL W P-5'-P-CCNC: 24 U/L (ref 12–78)
ANION GAP SERPL CALCULATED.3IONS-SCNC: 2 MMOL/L (ref 4–13)
AST SERPL W P-5'-P-CCNC: 18 U/L (ref 5–45)
BASOPHILS # BLD AUTO: 0.03 THOUSANDS/ÂΜL (ref 0–0.1)
BASOPHILS NFR BLD AUTO: 1 % (ref 0–1)
BILIRUB SERPL-MCNC: 0.62 MG/DL (ref 0.2–1)
BUN SERPL-MCNC: 12 MG/DL (ref 5–25)
CALCIUM SERPL-MCNC: 9.3 MG/DL (ref 8.3–10.1)
CHLORIDE SERPL-SCNC: 111 MMOL/L (ref 96–108)
CHOLEST SERPL-MCNC: 191 MG/DL
CO2 SERPL-SCNC: 27 MMOL/L (ref 21–32)
CREAT SERPL-MCNC: 0.77 MG/DL (ref 0.6–1.3)
EOSINOPHIL # BLD AUTO: 0.18 THOUSAND/ÂΜL (ref 0–0.61)
EOSINOPHIL NFR BLD AUTO: 3 % (ref 0–6)
ERYTHROCYTE [DISTWIDTH] IN BLOOD BY AUTOMATED COUNT: 13.2 % (ref 11.6–15.1)
EST. AVERAGE GLUCOSE BLD GHB EST-MCNC: 108 MG/DL
FERRITIN SERPL-MCNC: 17 NG/ML (ref 11–307)
FOLATE SERPL-MCNC: >22.3 NG/ML
GFR SERPL CREATININE-BSD FRML MDRD: 89 ML/MIN/1.73SQ M
GLUCOSE P FAST SERPL-MCNC: 79 MG/DL (ref 65–99)
HBA1C MFR BLD: 5.4 %
HCT VFR BLD AUTO: 43.8 % (ref 34.8–46.1)
HDLC SERPL-MCNC: 75 MG/DL
HGB BLD-MCNC: 13.9 G/DL (ref 11.5–15.4)
IMM GRANULOCYTES # BLD AUTO: 0.01 THOUSAND/UL (ref 0–0.2)
IMM GRANULOCYTES NFR BLD AUTO: 0 % (ref 0–2)
LDLC SERPL CALC-MCNC: 102 MG/DL (ref 0–100)
LYMPHOCYTES # BLD AUTO: 1.85 THOUSANDS/ÂΜL (ref 0.6–4.47)
LYMPHOCYTES NFR BLD AUTO: 31 % (ref 14–44)
MCH RBC QN AUTO: 28.2 PG (ref 26.8–34.3)
MCHC RBC AUTO-ENTMCNC: 31.7 G/DL (ref 31.4–37.4)
MCV RBC AUTO: 89 FL (ref 82–98)
MONOCYTES # BLD AUTO: 0.26 THOUSAND/ÂΜL (ref 0.17–1.22)
MONOCYTES NFR BLD AUTO: 4 % (ref 4–12)
NEUTROPHILS # BLD AUTO: 3.57 THOUSANDS/ÂΜL (ref 1.85–7.62)
NEUTS SEG NFR BLD AUTO: 61 % (ref 43–75)
NONHDLC SERPL-MCNC: 116 MG/DL
NRBC BLD AUTO-RTO: 0 /100 WBCS
PLATELET # BLD AUTO: 279 THOUSANDS/UL (ref 149–390)
PMV BLD AUTO: 10.1 FL (ref 8.9–12.7)
POTASSIUM SERPL-SCNC: 3.7 MMOL/L (ref 3.5–5.3)
PROT SERPL-MCNC: 7.1 G/DL (ref 6.4–8.4)
RBC # BLD AUTO: 4.93 MILLION/UL (ref 3.81–5.12)
SODIUM SERPL-SCNC: 140 MMOL/L (ref 135–147)
TRIGL SERPL-MCNC: 70 MG/DL
TSH SERPL DL<=0.05 MIU/L-ACNC: 2.17 UIU/ML (ref 0.45–4.5)
VIT B12 SERPL-MCNC: 845 PG/ML (ref 180–914)
WBC # BLD AUTO: 5.9 THOUSAND/UL (ref 4.31–10.16)

## 2023-06-05 PROCEDURE — 82607 VITAMIN B-12: CPT

## 2023-06-05 PROCEDURE — 83036 HEMOGLOBIN GLYCOSYLATED A1C: CPT

## 2023-06-05 PROCEDURE — 80061 LIPID PANEL: CPT

## 2023-06-05 PROCEDURE — 85025 COMPLETE CBC W/AUTO DIFF WBC: CPT

## 2023-06-05 PROCEDURE — 80053 COMPREHEN METABOLIC PANEL: CPT

## 2023-06-05 PROCEDURE — 36415 COLL VENOUS BLD VENIPUNCTURE: CPT

## 2023-06-05 PROCEDURE — 82746 ASSAY OF FOLIC ACID SERUM: CPT

## 2023-06-05 PROCEDURE — 82306 VITAMIN D 25 HYDROXY: CPT

## 2023-06-05 PROCEDURE — 82728 ASSAY OF FERRITIN: CPT

## 2023-06-05 PROCEDURE — 84443 ASSAY THYROID STIM HORMONE: CPT

## 2023-07-19 ENCOUNTER — APPOINTMENT (OUTPATIENT)
Dept: RADIOLOGY | Age: 53
End: 2023-07-19
Payer: COMMERCIAL

## 2023-07-19 DIAGNOSIS — M25.562 LEFT KNEE PAIN, UNSPECIFIED CHRONICITY: ICD-10-CM

## 2023-07-19 DIAGNOSIS — M25.561 RIGHT KNEE PAIN, UNSPECIFIED CHRONICITY: ICD-10-CM

## 2023-07-19 PROCEDURE — 73562 X-RAY EXAM OF KNEE 3: CPT

## 2024-03-14 ENCOUNTER — APPOINTMENT (OUTPATIENT)
Dept: LAB | Age: 54
End: 2024-03-14
Payer: COMMERCIAL

## 2024-03-14 DIAGNOSIS — E55.9 AVITAMINOSIS D: ICD-10-CM

## 2024-03-14 DIAGNOSIS — Z79.899 ENCOUNTER FOR LONG-TERM (CURRENT) USE OF OTHER MEDICATIONS: ICD-10-CM

## 2024-03-14 LAB
25(OH)D3 SERPL-MCNC: 35.1 NG/ML (ref 30–100)
ALBUMIN SERPL BCP-MCNC: 4.1 G/DL (ref 3.5–5)
ALP SERPL-CCNC: 59 U/L (ref 34–104)
ALT SERPL W P-5'-P-CCNC: 13 U/L (ref 7–52)
ANION GAP SERPL CALCULATED.3IONS-SCNC: 6 MMOL/L (ref 4–13)
AST SERPL W P-5'-P-CCNC: 19 U/L (ref 13–39)
BASOPHILS # BLD AUTO: 0.05 THOUSANDS/ÂΜL (ref 0–0.1)
BASOPHILS NFR BLD AUTO: 1 % (ref 0–1)
BILIRUB SERPL-MCNC: 0.55 MG/DL (ref 0.2–1)
BUN SERPL-MCNC: 12 MG/DL (ref 5–25)
CALCIUM SERPL-MCNC: 9.4 MG/DL (ref 8.4–10.2)
CHLORIDE SERPL-SCNC: 109 MMOL/L (ref 96–108)
CHOLEST SERPL-MCNC: 198 MG/DL
CO2 SERPL-SCNC: 25 MMOL/L (ref 21–32)
CREAT SERPL-MCNC: 0.68 MG/DL (ref 0.6–1.3)
EOSINOPHIL # BLD AUTO: 0.36 THOUSAND/ÂΜL (ref 0–0.61)
EOSINOPHIL NFR BLD AUTO: 6 % (ref 0–6)
ERYTHROCYTE [DISTWIDTH] IN BLOOD BY AUTOMATED COUNT: 13.1 % (ref 11.6–15.1)
GFR SERPL CREATININE-BSD FRML MDRD: 100 ML/MIN/1.73SQ M
GLUCOSE P FAST SERPL-MCNC: 84 MG/DL (ref 65–99)
HCT VFR BLD AUTO: 42.6 % (ref 34.8–46.1)
HDLC SERPL-MCNC: 75 MG/DL
HGB BLD-MCNC: 13.8 G/DL (ref 11.5–15.4)
IMM GRANULOCYTES # BLD AUTO: 0.06 THOUSAND/UL (ref 0–0.2)
IMM GRANULOCYTES NFR BLD AUTO: 1 % (ref 0–2)
LDLC SERPL CALC-MCNC: 107 MG/DL (ref 0–100)
LITHIUM SERPL-SCNC: 0.51 MMOL/L (ref 0.6–1.2)
LYMPHOCYTES # BLD AUTO: 1.79 THOUSANDS/ÂΜL (ref 0.6–4.47)
LYMPHOCYTES NFR BLD AUTO: 31 % (ref 14–44)
MCH RBC QN AUTO: 29.2 PG (ref 26.8–34.3)
MCHC RBC AUTO-ENTMCNC: 32.4 G/DL (ref 31.4–37.4)
MCV RBC AUTO: 90 FL (ref 82–98)
MONOCYTES # BLD AUTO: 0.23 THOUSAND/ÂΜL (ref 0.17–1.22)
MONOCYTES NFR BLD AUTO: 4 % (ref 4–12)
NEUTROPHILS # BLD AUTO: 3.21 THOUSANDS/ÂΜL (ref 1.85–7.62)
NEUTS SEG NFR BLD AUTO: 57 % (ref 43–75)
NONHDLC SERPL-MCNC: 123 MG/DL
NRBC BLD AUTO-RTO: 0 /100 WBCS
PLATELET # BLD AUTO: 261 THOUSANDS/UL (ref 149–390)
PMV BLD AUTO: 9.7 FL (ref 8.9–12.7)
POTASSIUM SERPL-SCNC: 3.9 MMOL/L (ref 3.5–5.3)
PROT SERPL-MCNC: 7.1 G/DL (ref 6.4–8.4)
RBC # BLD AUTO: 4.73 MILLION/UL (ref 3.81–5.12)
SODIUM SERPL-SCNC: 140 MMOL/L (ref 135–147)
T4 SERPL-MCNC: 8.05 UG/DL (ref 6.09–12.23)
TRIGL SERPL-MCNC: 82 MG/DL
TSH SERPL DL<=0.05 MIU/L-ACNC: 2.87 UIU/ML (ref 0.45–4.5)
WBC # BLD AUTO: 5.7 THOUSAND/UL (ref 4.31–10.16)

## 2024-03-14 PROCEDURE — 84443 ASSAY THYROID STIM HORMONE: CPT

## 2024-03-14 PROCEDURE — 80178 ASSAY OF LITHIUM: CPT

## 2024-03-14 PROCEDURE — 85025 COMPLETE CBC W/AUTO DIFF WBC: CPT

## 2024-03-14 PROCEDURE — 82306 VITAMIN D 25 HYDROXY: CPT

## 2024-03-14 PROCEDURE — 80061 LIPID PANEL: CPT

## 2024-03-14 PROCEDURE — 84436 ASSAY OF TOTAL THYROXINE: CPT

## 2024-03-14 PROCEDURE — 80053 COMPREHEN METABOLIC PANEL: CPT

## 2024-03-14 PROCEDURE — 36415 COLL VENOUS BLD VENIPUNCTURE: CPT

## 2024-05-31 ENCOUNTER — HOSPITAL ENCOUNTER (EMERGENCY)
Facility: HOSPITAL | Age: 54
Discharge: HOME | End: 2024-05-31
Attending: EMERGENCY MEDICINE
Payer: COMMERCIAL

## 2024-05-31 VITALS
TEMPERATURE: 98.2 F | DIASTOLIC BLOOD PRESSURE: 80 MMHG | RESPIRATION RATE: 18 BRPM | SYSTOLIC BLOOD PRESSURE: 131 MMHG | OXYGEN SATURATION: 96 % | HEART RATE: 93 BPM

## 2024-05-31 DIAGNOSIS — K52.9 GASTROENTERITIS: Primary | ICD-10-CM

## 2024-05-31 LAB
ALBUMIN SERPL BCP-MCNC: 4.1 G/DL (ref 3.5–5)
ALP SERPL-CCNC: 59 U/L (ref 34–104)
ALT SERPL W P-5'-P-CCNC: 21 U/L (ref 7–52)
ANION GAP SERPL CALCULATED.3IONS-SCNC: 9 MMOL/L (ref 4–13)
AST SERPL W P-5'-P-CCNC: 36 U/L (ref 13–39)
BASOPHILS # BLD AUTO: 0.01 THOUSANDS/ÂΜL (ref 0–0.1)
BASOPHILS NFR BLD AUTO: 0 % (ref 0–1)
BILIRUB SERPL-MCNC: 0.43 MG/DL (ref 0.2–1)
BUN SERPL-MCNC: 16 MG/DL (ref 5–25)
CALCIUM SERPL-MCNC: 9.3 MG/DL (ref 8.4–10.2)
CHLORIDE SERPL-SCNC: 104 MMOL/L (ref 96–108)
CO2 SERPL-SCNC: 22 MMOL/L (ref 21–32)
CREAT SERPL-MCNC: 0.79 MG/DL (ref 0.6–1.3)
EOSINOPHIL # BLD AUTO: 0.03 THOUSAND/ÂΜL (ref 0–0.61)
EOSINOPHIL NFR BLD AUTO: 1 % (ref 0–6)
ERYTHROCYTE [DISTWIDTH] IN BLOOD BY AUTOMATED COUNT: 12.6 % (ref 11.6–15.1)
GFR SERPL CREATININE-BSD FRML MDRD: 86 ML/MIN/1.73SQ M
GLUCOSE SERPL-MCNC: 103 MG/DL (ref 65–140)
HCT VFR BLD AUTO: 48.6 % (ref 34.8–46.1)
HGB BLD-MCNC: 16.2 G/DL (ref 11.5–15.4)
IMM GRANULOCYTES # BLD AUTO: 0.01 THOUSAND/UL (ref 0–0.2)
IMM GRANULOCYTES NFR BLD AUTO: 0 % (ref 0–2)
LIPASE SERPL-CCNC: 22 U/L (ref 11–82)
LYMPHOCYTES # BLD AUTO: 1.57 THOUSANDS/ÂΜL (ref 0.6–4.47)
LYMPHOCYTES NFR BLD AUTO: 43 % (ref 14–44)
MCH RBC QN AUTO: 29.2 PG (ref 26.8–34.3)
MCHC RBC AUTO-ENTMCNC: 33.3 G/DL (ref 31.4–37.4)
MCV RBC AUTO: 88 FL (ref 82–98)
MONOCYTES # BLD AUTO: 0.28 THOUSAND/ÂΜL (ref 0.17–1.22)
MONOCYTES NFR BLD AUTO: 8 % (ref 4–12)
NEUTROPHILS # BLD AUTO: 1.74 THOUSANDS/ÂΜL (ref 1.85–7.62)
NEUTS SEG NFR BLD AUTO: 48 % (ref 43–75)
NRBC BLD AUTO-RTO: 0 /100 WBCS
PLATELET # BLD AUTO: 198 THOUSANDS/UL (ref 149–390)
PMV BLD AUTO: 9.1 FL (ref 8.9–12.7)
POTASSIUM SERPL-SCNC: 3.6 MMOL/L (ref 3.5–5.3)
PROT SERPL-MCNC: 7.3 G/DL (ref 6.4–8.4)
RBC # BLD AUTO: 5.54 MILLION/UL (ref 3.81–5.12)
SODIUM SERPL-SCNC: 135 MMOL/L (ref 135–147)
WBC # BLD AUTO: 3.64 THOUSAND/UL (ref 4.31–10.16)

## 2024-05-31 PROCEDURE — 85025 COMPLETE CBC W/AUTO DIFF WBC: CPT | Performed by: EMERGENCY MEDICINE

## 2024-05-31 PROCEDURE — 99283 EMERGENCY DEPT VISIT LOW MDM: CPT

## 2024-05-31 PROCEDURE — 83690 ASSAY OF LIPASE: CPT | Performed by: EMERGENCY MEDICINE

## 2024-05-31 PROCEDURE — 80053 COMPREHEN METABOLIC PANEL: CPT | Performed by: EMERGENCY MEDICINE

## 2024-05-31 PROCEDURE — 96361 HYDRATE IV INFUSION ADD-ON: CPT

## 2024-05-31 PROCEDURE — 36415 COLL VENOUS BLD VENIPUNCTURE: CPT

## 2024-05-31 PROCEDURE — 96374 THER/PROPH/DIAG INJ IV PUSH: CPT

## 2024-05-31 PROCEDURE — 99284 EMERGENCY DEPT VISIT MOD MDM: CPT | Performed by: EMERGENCY MEDICINE

## 2024-05-31 RX ORDER — ONDANSETRON 2 MG/ML
4 INJECTION INTRAMUSCULAR; INTRAVENOUS ONCE
Status: COMPLETED | OUTPATIENT
Start: 2024-05-31 | End: 2024-05-31

## 2024-05-31 RX ORDER — ONDANSETRON 4 MG/1
4 TABLET, ORALLY DISINTEGRATING ORAL EVERY 6 HOURS PRN
Qty: 10 TABLET | Refills: 0 | Status: SHIPPED | OUTPATIENT
Start: 2024-05-31

## 2024-05-31 RX ADMIN — SODIUM CHLORIDE 500 ML: 0.9 INJECTION, SOLUTION INTRAVENOUS at 16:24

## 2024-05-31 RX ADMIN — ONDANSETRON 4 MG: 2 INJECTION INTRAMUSCULAR; INTRAVENOUS at 16:24

## 2024-05-31 RX ADMIN — SODIUM CHLORIDE 1000 ML: 0.9 INJECTION, SOLUTION INTRAVENOUS at 17:09

## 2024-05-31 NOTE — ED PROVIDER NOTES
History  Chief Complaint   Patient presents with    Chills     Pt presents with chills/diarrhea/dehydration/vomiting since Tuesday. Denies CP/SOB     53-year-old female with history of Claudette-en-Y gastric bypass in 2006 presenting due to 3 days of nausea, vomiting, body aches, diarrhea and concern for dehydration.  Patient states she has been taking care of a child who last week had a GI bug, over the weekend her  had diarrhea that lasted a couple days.  Soon after her  symptoms resolved, patient started having bodyaches, nonblack nonbloody diarrhea as well as nausea and vomiting.  Patient denies any fevers at home, chest pain, shortness of breath, cough, congestion.        Prior to Admission Medications   Prescriptions Last Dose Informant Patient Reported? Taking?   Cholecalciferol (VITAMIN D) 50 MCG (2000 UT) tablet   Yes No   Sig: Take by mouth   Cyanocobalamin (VITAMIN B-12 SL)   Yes No   Sig: Place under the tongue daily   Multiple Vitamin (MULTIVITAMIN) capsule   Yes No   Sig: Take 1 capsule by mouth daily   PREVIDENT 5000 BOOSTER PLUS 1.1 % PSTE   Yes No   Sig: USE AS DIRECTED BY DOCTOR   QUEtiapine (SEROquel) 100 mg tablet   Yes No   Sig: Take 100 mg by mouth daily at bedtime   acetaminophen (TYLENOL) 500 mg tablet   Yes No   Sig: Take 500 mg by mouth   busPIRone (BUSPAR) 5 mg tablet   Yes No   Sig: Take 5 mg by mouth 3 (three) times a day   hydrOXYzine pamoate (VISTARIL) 50 mg capsule   Yes No   Sig: Take 50 mg by mouth 2 (two) times a day   lithium carbonate 150 mg capsule   Yes No   Sig: Take 150 mg by mouth every morning   lithium carbonate 300 mg capsule   Yes No   Sig: Take 900 mg by mouth daily at bedtime   rOPINIRole (REQUIP) 2 mg tablet   Yes No   Sig: Take 2 mg by mouth daily at bedtime   topiramate (TOPAMAX) 25 mg tablet   Yes No   Sig: TAKE 3 TABLETS BY MOUTH THREE TIMES DAILY      Facility-Administered Medications: None       Past Medical History:   Diagnosis Date    Anemia     iron  "deicient    Anxiety     panic disorder    Bipolar disorder (HCC)     Dental cavity     lower left back removed on 1/5/18 and sutures present to be removed 1/12     Depression     GERD (gastroesophageal reflux disease)     History of bariatric surgery     RUEN-Y  10/2006  wt loss 166lb w/ gain now    Hypoglycemia     Morbid obesity (HCC)     Nausea     PCOS (polycystic ovarian syndrome)     Shortness of breath     going up stairs    Sleep apnea     \"doesn't use machine\"    Stomach ulcer     Tooth loose     upper front       Past Surgical History:   Procedure Laterality Date    BARIATRIC SURGERY      approx 11 yrs ago    CHOLECYSTECTOMY      ESOPHAGOGASTRODUODENOSCOPY N/A 1/24/2018    Procedure: INTRAOPERATIVE ESOPHAGOGASTRODUODENOSCOPY (EGD);  Surgeon: Abbey Flores MD;  Location: [facility];  Service: Bariatrics    PANNICULECTOMY      Last assessed 12/15/2017     SD EGD TRANSORAL BIOPSY SINGLE/MULTIPLE N/A 10/4/2017    Procedure: ESOPHAGOGASTRODUODENOSCOPY (EGD) with biopsy;  Surgeon: Abbey Flores MD;  Location: [facility];  Service: Bariatrics    SD LAPS GSTR RSTCV PX W/BYP REINALDO-EN-Y LIMB <150 CM N/A 1/24/2018    Procedure: LAPAROSCOPIC REVISION OF REINALDO-EN-Y GASTRIC BYPASS ROBOTICALLY ASSISTED;  Surgeon: Abbey Flores MD;  Location: [facility];  Service: Bariatrics    TOTAL BODY LIFT      approx 2008    UPPER GASTROINTESTINAL ENDOSCOPY      WISDOM TOOTH EXTRACTION         Family History   Problem Relation Age of Onset    Bipolar disorder Mother     Diabetes Mother     Hypertension Father     Bipolar disorder Maternal Grandmother     Diabetes Maternal Grandmother     Breast cancer Maternal Grandmother     Ovarian cancer Maternal Grandmother     Ovarian cancer Family     Diabetes Family     Bipolar disorder Family     Cancer Paternal Grandmother      I have reviewed and agree with the history as documented.    E-Cigarette/Vaping    E-Cigarette Use Never User      E-Cigarette/Vaping Substances     Social History "     Tobacco Use    Smoking status: Former     Current packs/day: 0.00     Average packs/day: 0.3 packs/day for 9.0 years (2.3 ttl pk-yrs)     Types: Cigarettes     Start date:      Quit date:      Years since quittin.4    Smokeless tobacco: Never    Tobacco comments:     quit approx 20 yrs ago   Vaping Use    Vaping status: Never Used   Substance Use Topics    Alcohol use: No     Comment: recovered alcoholic since     Drug use: No        Review of Systems   Constitutional:  Positive for chills and fatigue. Negative for fever.   HENT:  Negative for ear pain and sore throat.    Eyes:  Negative for pain and visual disturbance.   Respiratory:  Negative for cough and shortness of breath.    Cardiovascular:  Negative for chest pain and palpitations.   Gastrointestinal:  Positive for diarrhea, nausea and vomiting. Negative for abdominal pain.   Genitourinary:  Negative for dysuria and hematuria.   Musculoskeletal:  Negative for arthralgias and back pain.   Skin:  Negative for color change and rash.   Neurological:  Negative for dizziness, syncope, weakness, light-headedness and headaches.   All other systems reviewed and are negative.      Physical Exam  ED Triage Vitals [24 1405]   Temperature Pulse Respirations Blood Pressure SpO2   98.2 °F (36.8 °C) (!) 110 18 150/81 99 %      Temp Source Heart Rate Source Patient Position - Orthostatic VS BP Location FiO2 (%)   Oral Monitor Sitting Right arm --      Pain Score       --             Orthostatic Vital Signs  Vitals:    24 1405 24 1605   BP: 150/81 131/80   Pulse: (!) 110 93   Patient Position - Orthostatic VS: Sitting Sitting       Physical Exam  Vitals and nursing note reviewed.   Constitutional:       General: She is not in acute distress.     Appearance: She is well-developed.   HENT:      Head: Normocephalic and atraumatic.      Nose: Nose normal. No congestion.      Mouth/Throat:      Mouth: Mucous membranes are moist.      Pharynx:  Oropharynx is clear.   Eyes:      Extraocular Movements: Extraocular movements intact.      Conjunctiva/sclera: Conjunctivae normal.      Pupils: Pupils are equal, round, and reactive to light.   Cardiovascular:      Rate and Rhythm: Normal rate and regular rhythm.      Pulses: Normal pulses.      Heart sounds: Normal heart sounds. No murmur heard.  Pulmonary:      Effort: Pulmonary effort is normal. No respiratory distress.      Breath sounds: Normal breath sounds. No wheezing or rales.   Chest:      Chest wall: No tenderness.   Abdominal:      General: Abdomen is flat. Bowel sounds are normal.      Palpations: Abdomen is soft.      Tenderness: There is no abdominal tenderness. There is no right CVA tenderness or left CVA tenderness.   Musculoskeletal:         General: No deformity or signs of injury. Normal range of motion.      Cervical back: Normal range of motion and neck supple. No rigidity or tenderness.      Right lower leg: No edema.      Left lower leg: No edema.   Skin:     General: Skin is warm and dry.      Findings: No bruising, lesion or rash.   Neurological:      General: No focal deficit present.      Mental Status: She is alert and oriented to person, place, and time.         ED Medications  Medications   sodium chloride 0.9 % bolus 500 mL (0 mL Intravenous Stopped 5/31/24 1845)   ondansetron (ZOFRAN) injection 4 mg (4 mg Intravenous Given 5/31/24 1624)   sodium chloride 0.9 % bolus 1,000 mL (0 mL Intravenous Stopped 5/31/24 1845)       Diagnostic Studies  Results Reviewed       Procedure Component Value Units Date/Time    Comprehensive metabolic panel [[id number]] Collected: 05/31/24 1409    Lab Status: Final result Specimen: Blood from Arm, Left Updated: 05/31/24 1432     Sodium 135 mmol/L      Potassium 3.6 mmol/L      Chloride 104 mmol/L      CO2 22 mmol/L      ANION GAP 9 mmol/L      BUN 16 mg/dL      Creatinine 0.79 mg/dL      Glucose 103 mg/dL      Calcium 9.3 mg/dL      AST 36 U/L      ALT  21 U/L      Alkaline Phosphatase 59 U/L      Total Protein 7.3 g/dL      Albumin 4.1 g/dL      Total Bilirubin 0.43 mg/dL      eGFR 86 ml/min/1.73sq m     Narrative:      National Kid[employer] Foundation guidelines for Chronic Kidney Disease (CKD):     Stage 1 with normal or high GFR (GFR > 90 mL/min/1.73 square meters)    Stage 2 Mild CKD (GFR = 60-89 mL/min/1.73 square meters)    Stage 3A Moderate CKD (GFR = 45-59 mL/min/1.73 square meters)    Stage 3B Moderate CKD (GFR = 30-44 mL/min/1.73 square meters)    Stage 4 Severe CKD (GFR = 15-29 mL/min/1.73 square meters)    Stage 5 End Stage CKD (GFR <15 mL/min/1.73 square meters)  Note: GFR calculation is accurate only with a steady state creatinine    Lipase [[id number]]  (Normal) Collected: 05/31/24 1409    Lab Status: Final result Specimen: Blood from Arm, Left Updated: 05/31/24 1432     Lipase 22 u/L     CBC and differential [[id number]]  (Abnormal) Collected: 05/31/24 1409    Lab Status: Final result Specimen: Blood from Arm, Left Updated: 05/31/24 1419     WBC 3.64 Thousand/uL      RBC 5.54 Million/uL      Hemoglobin 16.2 g/dL      Hematocrit 48.6 %      MCV 88 fL      MCH 29.2 pg      MCHC 33.3 g/dL      RDW 12.6 %      MPV 9.1 fL      Platelets 198 Thousands/uL      nRBC 0 /100 WBCs      Segmented % 48 %      Immature Grans % 0 %      Lymphocytes % 43 %      Monocytes % 8 %      Eosinophils Relative 1 %      Basophils Relative 0 %      Absolute Neutrophils 1.74 Thousands/µL      Absolute Immature Grans 0.01 Thousand/uL      Absolute Lymphocytes 1.57 Thousands/µL      Absolute Monocytes 0.28 Thousand/µL      Eosinophils Absolute 0.03 Thousand/µL      Basophils Absolute 0.01 Thousands/µL                    No orders to display         Procedures  Procedures      ED Course  ED Course as of 06/01/24 0054   Fri May 31, 2024   1620 53-year-old female presenting due to concern of dehydration.  Patient originally slightly tachycardic on arrival which has normalized at  this point.  Vitals otherwise unremarkable.  Labs at this time unremarkable.  Due to recent sick contacts and similar symptoms, likely gastroenteritis, will treat with Zofran and a fluid bolus and reassess to ensure patient can tolerate p.o.   1848 On reassessment, patient feeling better after Zofran and fluid boluses.  Received about 1300 mL at this point.  Patient able to tolerate p.o. fluids and crackers without nausea.  Will discharge home with Zofran and have patient follow-up with family doctor as needed for reassessment.  Patient is agreeable and appropriate for discharge at this time.                                       Medical Decision Making  Amount and/or Complexity of Data Reviewed  Labs: ordered.    Risk  Prescription drug management.          Disposition  Final diagnoses:   Gastroenteritis     Time reflects when diagnosis was documented in both MDM as applicable and the Disposition within this note       Time User Action Codes Description Comment    5/31/2024  4:19 PM Chun Lopez [K52.9] Gastroenteritis           ED Disposition       ED Disposition   Discharge    Condition   Stable    Date/Time   Fri May 31, 2024  6:51 PM    Comment   Aury Acosta discharge to home/self care.                   Follow-up Information       Follow up With Specialties Details Why Contact Info Additional Information    Jada Lazo MD Family Medicine   [address]  [facility] [address]  [phone]       [facility] Emergency Department Emergency Medicine   [facility] [facility] [address]  [phone] [facility] Emergency Department, [facility] [place], [place], [address]            Discharge Medication List as of 5/31/2024  6:53 PM        START taking these medications    Details   ondansetron (ZOFRAN-ODT) 4 mg disintegrating tablet Take 1 tablet (4 mg total) by mouth every 6 (six) hours as needed for nausea or vomiting  for up to 10 doses, Starting Fri 5/31/2024, Normal           CONTINUE these medications which have NOT CHANGED    Details   acetaminophen (TYLENOL) 500 mg tablet Take 500 mg by mouth, Historical Med      busPIRone (BUSPAR) 5 mg tablet Take 5 mg by mouth 3 (three) times a day, Historical Med      Cholecalciferol (VITAMIN D) 50 MCG (2000 UT) tablet Take by mouth, Historical Med      Cyanocobalamin (VITAMIN B-12 SL) Place under the tongue daily, Historical Med      hydrOXYzine pamoate (VISTARIL) 50 mg capsule Take 50 mg by mouth 2 (two) times a day, Historical Med      !! lithium carbonate 150 mg capsule Take 150 mg by mouth every morning, Starting Thu 1/21/2021, Historical Med      !! lithium carbonate 300 mg capsule Take 900 mg by mouth daily at bedtime, Starting Thu 1/21/2021, Historical Med      Multiple Vitamin (MULTIVITAMIN) capsule Take 1 capsule by mouth daily, Historical Med      PREVIDENT 5000 BOOSTER PLUS 1.1 % PSTE USE AS DIRECTED BY DOCTOR, Historical Med      QUEtiapine (SEROquel) 100 mg tablet Take 100 mg by mouth daily at bedtime, Starting Thu 1/21/2021, Historical Med      rOPINIRole (REQUIP) 2 mg tablet Take 2 mg by mouth daily at bedtime, Starting Thu 1/21/2021, Historical Med      topiramate (TOPAMAX) 25 mg tablet TAKE 3 TABLETS BY MOUTH THREE TIMES DAILY, Historical Med       !! - Potential duplicate medications found. Please discuss with provider.        No discharge procedures on file.    PDMP Review       None             ED Provider  Attending physically available and evaluated Aury Acosta. I managed the patient along with the ED Attending.    Electronically Signed by           Chun Lopez MD  06/01/24 0054

## 2024-05-31 NOTE — Clinical Note
Aury Acosta was seen and treated in our emergency department on 5/31/2024.                Diagnosis:     Aury  .    She may return on this date:          If you have any questions or concerns, please don't hesitate to call.      Chun Lopez MD    ______________________________           _______________          _______________  Hospital Representative                              Date                                Time (0) independent

## 2024-05-31 NOTE — ED ATTENDING ATTESTATION
5/31/2024  IBarby DO, saw and evaluated the patient. I have discussed the patient with the resident/non-physician practitioner and agree with the resident's/non-physician practitioner's findings, Plan of Care, and MDM as documented in the resident's/non-physician practitioner's note, except where noted. All available labs and Radiology studies were reviewed.  I was present for key portions of any procedure(s) performed by the resident/non-physician practitioner and I was immediately available to provide assistance.       At this point I agree with the current assessment done in the Emergency Department.  I have conducted an independent evaluation of this patient a history and physical is as follows:    ED Course   53 year old female presents accompanied by her  for evaluation of a 3-day history of nausea, vomiting, diarrhea and chills.  Patient states that her  had diarrhea and chills Saturday night into Sunday.  Approximately 1 day later she began to feel unwell and by Tuesday she was having nausea vomiting and diarrhea.  She reports having several episodes of loose watery nonbloody diarrhea.  She has not had fever but has felt chilled.  She does have a history of gastric bypass and states that she has had issues with dehydration in the past.  Patient denies recent travel.  No suspicious food exposure.  She denies associated abdominal pain.    Past medical history: Gastric bypass bipolar disorder    Physical exam: Patient is awake and alert.  Nontoxic in appearance.  Pupils are equal and reactive.  Mucous membranes are slightly dry.  Neck is supple.  Heart is regular rate and rhythm without ectopy.  Lungs are clear to auscultation.  Abdomen is soft, nontender, nondistended with normal active bowel sounds.  No rebound or guarding.  No CVA tenderness.  No lower extremity edema.  No focal motor or sensory deficits.    Assessment/plan: 53-year-old female presents with acute nausea vomiting  diarrhea.  Differential diagnosis includes was not limited to acute viral gastroenteritis, foodborne illness, malabsorption, gastritis, colitis.  Will check electrolytes, start IV fluid hydration and Zofran for nausea.  Patient declines pain medication.  Will reassess after medication.  Critical Care Time  Procedures

## 2024-05-31 NOTE — ED NOTES
Bolus of NS started at this time. Declines PO trial at this time.      Angela Diaz RN  05/31/24 [id number]

## 2024-05-31 NOTE — DISCHARGE INSTRUCTIONS
Follow-up with family doctor as needed for reassessment and management of symptoms.    You may use Zofran as prescribed for management of your nausea.    Please ensure you are drinking plenty of fluids to prevent dehydration.    Thank you for allowing us to take part in your care.

## 2025-02-12 DIAGNOSIS — Z00.6 ENCOUNTER FOR EXAMINATION FOR NORMAL COMPARISON OR CONTROL IN CLINICAL RESEARCH PROGRAM: ICD-10-CM

## 2025-02-14 ENCOUNTER — APPOINTMENT (OUTPATIENT)
Dept: LAB | Facility: IMAGING CENTER | Age: 55
End: 2025-02-14

## 2025-02-14 DIAGNOSIS — Z00.6 ENCOUNTER FOR EXAMINATION FOR NORMAL COMPARISON OR CONTROL IN CLINICAL RESEARCH PROGRAM: ICD-10-CM

## 2025-02-14 PROCEDURE — 36415 COLL VENOUS BLD VENIPUNCTURE: CPT

## 2025-03-12 ENCOUNTER — APPOINTMENT (OUTPATIENT)
Dept: LAB | Facility: IMAGING CENTER | Age: 55
End: 2025-03-12
Payer: COMMERCIAL

## 2025-03-12 DIAGNOSIS — Z79.899 ENCOUNTER FOR LONG-TERM (CURRENT) USE OF MEDICATIONS: ICD-10-CM

## 2025-03-12 LAB
25(OH)D3 SERPL-MCNC: 36 NG/ML (ref 30–100)
ALBUMIN SERPL BCG-MCNC: 4.3 G/DL (ref 3.5–5)
ALP SERPL-CCNC: 83 U/L (ref 34–104)
ALT SERPL W P-5'-P-CCNC: 26 U/L (ref 7–52)
ANION GAP SERPL CALCULATED.3IONS-SCNC: 8 MMOL/L (ref 4–13)
AST SERPL W P-5'-P-CCNC: 28 U/L (ref 13–39)
BASOPHILS # BLD AUTO: 0.05 THOUSANDS/ÂΜL (ref 0–0.1)
BASOPHILS NFR BLD AUTO: 1 % (ref 0–1)
BILIRUB SERPL-MCNC: 0.32 MG/DL (ref 0.2–1)
BUN SERPL-MCNC: 15 MG/DL (ref 5–25)
CALCIUM SERPL-MCNC: 9.6 MG/DL (ref 8.4–10.2)
CHLORIDE SERPL-SCNC: 106 MMOL/L (ref 96–108)
CHOLEST SERPL-MCNC: 208 MG/DL (ref ?–200)
CO2 SERPL-SCNC: 27 MMOL/L (ref 21–32)
CREAT SERPL-MCNC: 0.63 MG/DL (ref 0.6–1.3)
EOSINOPHIL # BLD AUTO: 0.31 THOUSAND/ÂΜL (ref 0–0.61)
EOSINOPHIL NFR BLD AUTO: 4 % (ref 0–6)
ERYTHROCYTE [DISTWIDTH] IN BLOOD BY AUTOMATED COUNT: 13.5 % (ref 11.6–15.1)
GFR SERPL CREATININE-BSD FRML MDRD: 102 ML/MIN/1.73SQ M
GLUCOSE P FAST SERPL-MCNC: 83 MG/DL (ref 65–99)
HCT VFR BLD AUTO: 45.5 % (ref 34.8–46.1)
HDLC SERPL-MCNC: 73 MG/DL
HGB BLD-MCNC: 14.2 G/DL (ref 11.5–15.4)
IMM GRANULOCYTES # BLD AUTO: 0.03 THOUSAND/UL (ref 0–0.2)
IMM GRANULOCYTES NFR BLD AUTO: 0 % (ref 0–2)
LDLC SERPL CALC-MCNC: 95 MG/DL (ref 0–100)
LITHIUM SERPL-SCNC: 1.43 MMOL/L (ref 0.6–1.2)
LYMPHOCYTES # BLD AUTO: 2.53 THOUSANDS/ÂΜL (ref 0.6–4.47)
LYMPHOCYTES NFR BLD AUTO: 29 % (ref 14–44)
MCH RBC QN AUTO: 27.8 PG (ref 26.8–34.3)
MCHC RBC AUTO-ENTMCNC: 31.2 G/DL (ref 31.4–37.4)
MCV RBC AUTO: 89 FL (ref 82–98)
MONOCYTES # BLD AUTO: 0.4 THOUSAND/ÂΜL (ref 0.17–1.22)
MONOCYTES NFR BLD AUTO: 5 % (ref 4–12)
NEUTROPHILS # BLD AUTO: 5.41 THOUSANDS/ÂΜL (ref 1.85–7.62)
NEUTS SEG NFR BLD AUTO: 61 % (ref 43–75)
NONHDLC SERPL-MCNC: 135 MG/DL
NRBC BLD AUTO-RTO: 0 /100 WBCS
PLATELET # BLD AUTO: 315 THOUSANDS/UL (ref 149–390)
PMV BLD AUTO: 10.1 FL (ref 8.9–12.7)
POTASSIUM SERPL-SCNC: 4 MMOL/L (ref 3.5–5.3)
PROT SERPL-MCNC: 7.2 G/DL (ref 6.4–8.4)
RBC # BLD AUTO: 5.11 MILLION/UL (ref 3.81–5.12)
SODIUM SERPL-SCNC: 141 MMOL/L (ref 135–147)
T4 SERPL-MCNC: 6.88 UG/DL (ref 6.09–12.23)
TRIGL SERPL-MCNC: 198 MG/DL (ref ?–150)
TSH SERPL DL<=0.05 MIU/L-ACNC: 3.27 UIU/ML (ref 0.45–4.5)
VIT B12 SERPL-MCNC: 349 PG/ML (ref 180–914)
WBC # BLD AUTO: 8.73 THOUSAND/UL (ref 4.31–10.16)

## 2025-03-12 PROCEDURE — 82607 VITAMIN B-12: CPT

## 2025-03-12 PROCEDURE — 80053 COMPREHEN METABOLIC PANEL: CPT

## 2025-03-12 PROCEDURE — 80178 ASSAY OF LITHIUM: CPT

## 2025-03-12 PROCEDURE — 84436 ASSAY OF TOTAL THYROXINE: CPT

## 2025-03-12 PROCEDURE — 82306 VITAMIN D 25 HYDROXY: CPT

## 2025-03-12 PROCEDURE — 84443 ASSAY THYROID STIM HORMONE: CPT

## 2025-03-12 PROCEDURE — 36415 COLL VENOUS BLD VENIPUNCTURE: CPT

## 2025-03-12 PROCEDURE — 85025 COMPLETE CBC W/AUTO DIFF WBC: CPT

## 2025-03-12 PROCEDURE — 80061 LIPID PANEL: CPT

## 2025-03-19 ENCOUNTER — APPOINTMENT (OUTPATIENT)
Dept: LAB | Facility: IMAGING CENTER | Age: 55
End: 2025-03-19
Payer: COMMERCIAL

## 2025-03-19 DIAGNOSIS — Z79.899 ENCOUNTER FOR LONG-TERM (CURRENT) USE OF MEDICATIONS: ICD-10-CM

## 2025-03-19 LAB — LITHIUM SERPL-SCNC: 0.83 MMOL/L (ref 0.6–1.2)

## 2025-03-19 PROCEDURE — 36415 COLL VENOUS BLD VENIPUNCTURE: CPT

## 2025-03-19 PROCEDURE — 80178 ASSAY OF LITHIUM: CPT

## 2025-05-05 LAB
APOB+LDLR+PCSK9 GENE MUT ANL BLD/T: NOT DETECTED
BRCA1+BRCA2 DEL+DUP + FULL MUT ANL BLD/T: NOT DETECTED
MLH1+MSH2+MSH6+PMS2 GN DEL+DUP+FUL M: NOT DETECTED

## (undated) DEVICE — SYRINGE 30ML LL

## (undated) DEVICE — JACKSON-PRATT 100CC BULB RESERVOIR: Brand: CARDINAL HEALTH

## (undated) DEVICE — LARGE NEEDLE DRIVER: Brand: ENDOWRIST;DAVINCI SI

## (undated) DEVICE — HARMONIC ACE

## (undated) DEVICE — SUT MONOCRYL 4-0 PS-2 27 IN Y426H

## (undated) DEVICE — TUBING SMOKE EVAC W/FILTRATION DEVICE PLUMEPORT ACTIV

## (undated) DEVICE — SCD SEQUENTIAL COMPRESSION COMFORT SLEEVE MEDIUM KNEE LENGTH: Brand: KENDALL SCD

## (undated) DEVICE — ROBOT INST CANNULA 8MM OBTURATOR BLUNT DISP

## (undated) DEVICE — VIOLET BRAIDED (POLYGLACTIN 910), SYNTHETIC ABSORBABLE SUTURE: Brand: COATED VICRYL

## (undated) DEVICE — SURGICAL GOWN, XL SMARTSLEEVE: Brand: CONVERTORS

## (undated) DEVICE — TRAY FOLEY 16FR URIMETER SILICONE SURESTEP

## (undated) DEVICE — ABSORBABLE WOUND CLOSURE DEVICE: Brand: V-LOC 90

## (undated) DEVICE — TUBING SUCTION 5MM X 12 FT

## (undated) DEVICE — SINGLE-USE BIOPSY FORCEPS: Brand: RADIAL JAW 4

## (undated) DEVICE — URETERAL CATHETER ADAPTOR TIP

## (undated) DEVICE — BLUE HEAT SCOPE WARMER

## (undated) DEVICE — CHLORAPREP HI-LITE 26ML ORANGE

## (undated) DEVICE — TROCAR PORT ACCESS 12 X120MM W/BLDLS OPTICAL TIP AIRSEAL

## (undated) DEVICE — ENDOPATH XCEL BLADELESS TROCARS WITH STABILITY SLEEVES: Brand: ENDOPATH XCEL

## (undated) DEVICE — GLOVE INDICATOR PI UNDERGLOVE SZ 7.5 BLUE

## (undated) DEVICE — TRAVELKIT CONTAINS FIRST STEP KIT (200ML EP-4 KIT) AND SOILED SCOPE BAG - 1 KIT: Brand: TRAVELKIT CONTAINS FIRST STEP KIT AND SOILED SCOPE BAG

## (undated) DEVICE — STRL UNIVERSAL MINOR GENERAL: Brand: CARDINAL HEALTH

## (undated) DEVICE — GLOVE INDICATOR PI UNDERGLOVE SZ 6.5 BLUE

## (undated) DEVICE — NEEDLE SPINAL 22G X 3.5IN  QUINCKE

## (undated) DEVICE — [HIGH FLOW INSUFFLATOR,  DO NOT USE IF PACKAGE IS DAMAGED,  KEEP DRY,  KEEP AWAY FROM SUNLIGHT,  PROTECT FROM HEAT AND RADIOACTIVE SOURCES.]: Brand: PNEUMOSURE

## (undated) DEVICE — 3000CC GUARDIAN II: Brand: GUARDIAN

## (undated) DEVICE — COVIDIEN ENDO GIA PURPLE (MED) RELOAD 60MM

## (undated) DEVICE — ANTI-FOG SOLUTION WITH FOAM PAD: Brand: DEVON

## (undated) DEVICE — JP CHANNEL DRAIN 19FR, FULL FLUTES: Brand: JACKSON-PRATT

## (undated) DEVICE — 2000CC GUARDIAN II: Brand: GUARDIAN

## (undated) DEVICE — GAUZE SPONGES,16 PLY: Brand: CURITY

## (undated) DEVICE — ROBOT ACCESSORY KIT 4 ARM

## (undated) DEVICE — HARMONIC ACE +7 LAPAROSCOPIC SHEARS ADVANCED HEMOSTASIS 5MM DIAMETER 36CM SHAFT LENGTH  FOR USE WITH GRAY HAND PIECE ONLY: Brand: HARMONIC ACE

## (undated) DEVICE — IRRIG ENDO FLO TUBING

## (undated) DEVICE — PMI DISPOSABLE PUNCTURE CLOSURE DEVICE / SUTURE GRASPER: Brand: PMI

## (undated) DEVICE — HARMONIC ACE: Brand: ENDOWRIST;DAVINCI SI

## (undated) DEVICE — TIBURON LAPAROSCOPIC ABDOMINAL DRAPE: Brand: CONVERTORS

## (undated) DEVICE — REM POLYHESIVE ADULT PATIENT RETURN ELECTRODE: Brand: VALLEYLAB

## (undated) DEVICE — ENDOPOUCH RETRIEVER SPECIMEN RETRIEVAL BAGS: Brand: ENDOPOUCH RETRIEVER

## (undated) DEVICE — DRAPE FLUID WARMER (BIRD BATH)

## (undated) DEVICE — WEBRIL 6 IN UNSTERILE

## (undated) DEVICE — INTENDED FOR TISSUE SEPARATION, AND OTHER PROCEDURES THAT REQUIRE A SHARP SURGICAL BLADE TO PUNCTURE OR CUT.: Brand: BARD-PARKER SAFETY BLADES SIZE 15, STERILE

## (undated) DEVICE — AIRSEAL TUBE SMOKE EVAC LUMENX3 FILTERED

## (undated) DEVICE — GLOVE INDICATOR PI UNDERGLOVE SZ 7 BLUE

## (undated) DEVICE — ADHESIVE SKIN CLSR DERMABOND NX

## (undated) DEVICE — DRAPE TOWEL: Brand: CONVERTORS

## (undated) DEVICE — ENDOPATH PNEUMONEEDLE INSUFFLATION NEEDLES WITH LUER LOCK CONNECTORS 120MM: Brand: ENDOPATH

## (undated) DEVICE — STAPLER ENDO GIA ULTRA XL 26CM

## (undated) DEVICE — STAPLER ENDO GIA ROTICULATOR 60-2.5

## (undated) DEVICE — CADIERE FORCEPS: Brand: ENDOWRIST;DAVINCI SI